# Patient Record
Sex: FEMALE | Race: WHITE | NOT HISPANIC OR LATINO | Employment: FULL TIME | URBAN - METROPOLITAN AREA
[De-identification: names, ages, dates, MRNs, and addresses within clinical notes are randomized per-mention and may not be internally consistent; named-entity substitution may affect disease eponyms.]

---

## 2017-01-22 ENCOUNTER — OFFICE VISIT (OUTPATIENT)
Dept: URGENT CARE | Facility: URGENT CARE | Age: 57
End: 2017-01-22
Payer: COMMERCIAL

## 2017-01-22 VITALS
OXYGEN SATURATION: 96 % | HEART RATE: 94 BPM | TEMPERATURE: 97.6 F | SYSTOLIC BLOOD PRESSURE: 106 MMHG | DIASTOLIC BLOOD PRESSURE: 74 MMHG | WEIGHT: 172.38 LBS | BODY MASS INDEX: 25.83 KG/M2

## 2017-01-22 DIAGNOSIS — J45.901 ASTHMA EXACERBATION: Primary | ICD-10-CM

## 2017-01-22 DIAGNOSIS — R09.89 CHEST CONGESTION: ICD-10-CM

## 2017-01-22 DIAGNOSIS — J20.9 ACUTE BRONCHITIS WITH SYMPTOMS > 10 DAYS: ICD-10-CM

## 2017-01-22 PROCEDURE — 99214 OFFICE O/P EST MOD 30 MIN: CPT | Mod: 25 | Performed by: PHYSICIAN ASSISTANT

## 2017-01-22 PROCEDURE — 94640 AIRWAY INHALATION TREATMENT: CPT | Performed by: PHYSICIAN ASSISTANT

## 2017-01-22 RX ORDER — ALBUTEROL SULFATE 0.83 MG/ML
1 SOLUTION RESPIRATORY (INHALATION) ONCE
Qty: 3 ML | Refills: 0
Start: 2017-01-22 | End: 2019-10-02

## 2017-01-22 RX ORDER — PREDNISONE 20 MG/1
TABLET ORAL
Qty: 14 TABLET | Refills: 0 | Status: SHIPPED | OUTPATIENT
Start: 2017-01-22 | End: 2017-01-26 | Stop reason: ALTCHOICE

## 2017-01-22 RX ORDER — ALBUTEROL SULFATE 0.83 MG/ML
1 SOLUTION RESPIRATORY (INHALATION) EVERY 4 HOURS PRN
Qty: 1 BOX | Refills: 0 | Status: SHIPPED | OUTPATIENT
Start: 2017-01-22 | End: 2021-10-15

## 2017-01-22 RX ORDER — ALBUTEROL SULFATE 90 UG/1
2 AEROSOL, METERED RESPIRATORY (INHALATION) EVERY 6 HOURS
Qty: 1 INHALER | Refills: 0 | Status: SHIPPED | OUTPATIENT
Start: 2017-01-22 | End: 2019-12-27

## 2017-01-22 RX ORDER — ALBUTEROL SULFATE 90 UG/1
2 AEROSOL, METERED RESPIRATORY (INHALATION) EVERY 6 HOURS
Qty: 1 INHALER | Refills: 0 | Status: CANCELLED | OUTPATIENT
Start: 2017-01-22

## 2017-01-22 RX ORDER — AZITHROMYCIN 250 MG/1
TABLET, FILM COATED ORAL
Qty: 6 TABLET | Refills: 0 | Status: SHIPPED | OUTPATIENT
Start: 2017-01-22 | End: 2017-01-26 | Stop reason: ALTCHOICE

## 2017-01-22 NOTE — NURSING NOTE
"Chief Complaint   Patient presents with     Cough     cough, sob, chest pain when coughing, running stuffy nose and fatigue for three days.      Initial /74 mmHg  Pulse 94  Temp(Src) 97.6  F (36.4  C) (Oral)  Wt 172 lb 6 oz (78.189 kg)  SpO2 96% Estimated body mass index is 25.83 kg/(m^2) as calculated from the following:    Height as of 6/17/16: 5' 8.5\" (1.74 m).    Weight as of this encounter: 172 lb 6 oz (78.189 kg)..  bp completed using cuff size regular  MAYELIN Arzate MA    "

## 2017-01-22 NOTE — PROGRESS NOTES
SUBJECTIVE:   Aylin Harding is a 56 year old female presenting with a chief complaint of coughing, chest congestion, wheezing.  Onset of symptoms was 3 day(s) ago.  Course of illness is worsening.    Severity moderate  Current and Associated symptoms: chest congestion, wheezing   Treatment measures tried include albuterol.  Predisposing factors include recent illness, hx of asthma and pneumonia.    Past Medical History   Diagnosis Date     Pneumonia, organism unspecified      Bronchiectasis 9/5/08     Uncomplicated asthma      Hormone replacement therapy 2011     Osteoporosis 2001     followed by PCP (Dr. Springer)        Allergies   Allergen Reactions     Mold          Social History   Substance Use Topics     Smoking status: Never Smoker      Smokeless tobacco: Never Used     Alcohol Use: 0.0 oz/week     0 Standard drinks or equivalent per week      Comment: rare socially       ROS:  CONSTITUTIONAL:NEGATIVE for fever, chills, change in weight  INTEGUMENTARY/SKIN: NEGATIVE for worrisome rashes, moles or lesions  ENT/MOUTH: POSITIVE for nasal congestion  RESP:POSITIVE for cough-productive, Hx asthma and wheezing  CV: NEGATIVE for chest pain, palpitations or peripheral edema  GI: NEGATIVE for nausea, abdominal pain, heartburn, or change in bowel habits  MUSCULOSKELETAL: NEGATIVE for significant arthralgias or myalgia  NEURO: NEGATIVE for weakness, dizziness or paresthesias    OBJECTIVE  :/74 mmHg  Pulse 94  Temp(Src) 97.6  F (36.4  C) (Oral)  Wt 172 lb 6 oz (78.189 kg)  SpO2 96%  GENERAL APPEARANCE: healthy, alert and no distress  HENT: ear canals and TM's normal.  Nose and mouth without ulcers, erythema or lesions  NECK: supple, nontender, no lymphadenopathy  RESP: expiratory wheezes moderate and diffuse  CV: regular rates and rhythm, normal S1 S2, no murmur noted  ABDOMEN:  soft, nontender, no HSM or masses and bowel sounds normal  NEURO: Normal strength and tone, sensory exam grossly normal,  normal  speech and mentation  SKIN: no suspicious lesions or rashes    Albuterol and atrovent neb treatments    ASSESSMENT/PLAN;      ICD-10-CM    1. Asthma exacerbation J45.901 ipratropium (ATROVENT) 0.02 % neb solution     albuterol (PROVENTIL HFA) 108 (90 BASE) MCG/ACT Inhaler     albuterol (2.5 MG/3ML) 0.083% neb solution   2. Acute bronchitis with symptoms > 10 days J20.9 INHALATION/NEBULIZER TREATMENT, INITIAL     albuterol (2.5 MG/3ML) 0.083% neb solution     predniSONE (DELTASONE) 20 MG tablet     azithromycin (ZITHROMAX) 250 MG tablet     albuterol (2.5 MG/3ML) 0.083% neb solution   3. Chest congestion R09.89        Continue using nebs at home  Follow up with PCP as needed  Go to the ED if symptoms worsen

## 2017-04-10 ENCOUNTER — MYC MEDICAL ADVICE (OUTPATIENT)
Dept: OTHER | Facility: CLINIC | Age: 57
End: 2017-04-10

## 2017-04-10 DIAGNOSIS — J47.9 BRONCHIECTASIS WITHOUT COMPLICATION (H): ICD-10-CM

## 2017-04-11 RX ORDER — LEVOFLOXACIN 500 MG/1
500 TABLET, FILM COATED ORAL DAILY
Qty: 14 TABLET | Refills: 0 | Status: SHIPPED | OUTPATIENT
Start: 2017-04-11 | End: 2017-06-20

## 2017-04-11 RX ORDER — PREDNISONE 10 MG/1
TABLET ORAL
Qty: 30 TABLET | Refills: 0 | Status: SHIPPED | OUTPATIENT
Start: 2017-04-11 | End: 2017-04-18

## 2017-04-18 ENCOUNTER — HOSPITAL ENCOUNTER (OUTPATIENT)
Dept: RESPIRATORY THERAPY | Facility: CLINIC | Age: 57
End: 2017-04-18
Attending: PHYSICIAN ASSISTANT
Payer: COMMERCIAL

## 2017-04-18 ENCOUNTER — HOSPITAL ENCOUNTER (OUTPATIENT)
Dept: LAB | Facility: CLINIC | Age: 57
End: 2017-04-18
Attending: PHYSICIAN ASSISTANT
Payer: COMMERCIAL

## 2017-04-18 ENCOUNTER — HOSPITAL ENCOUNTER (OUTPATIENT)
Dept: CT IMAGING | Facility: CLINIC | Age: 57
Discharge: HOME | End: 2017-04-18
Attending: PHYSICIAN ASSISTANT | Admitting: PHYSICIAN ASSISTANT
Payer: COMMERCIAL

## 2017-04-18 ENCOUNTER — OFFICE VISIT (OUTPATIENT)
Dept: OTHER | Facility: CLINIC | Age: 57
End: 2017-04-18
Attending: PHYSICIAN ASSISTANT
Payer: COMMERCIAL

## 2017-04-18 VITALS
HEIGHT: 69 IN | OXYGEN SATURATION: 99 % | TEMPERATURE: 97.7 F | BODY MASS INDEX: 25.48 KG/M2 | DIASTOLIC BLOOD PRESSURE: 71 MMHG | HEART RATE: 65 BPM | SYSTOLIC BLOOD PRESSURE: 104 MMHG | WEIGHT: 172 LBS

## 2017-04-18 DIAGNOSIS — J47.1 BRONCHIECTASIS WITH ACUTE EXACERBATION (H): ICD-10-CM

## 2017-04-18 DIAGNOSIS — Z00.00 ENCOUNTER FOR ROUTINE ADULT HEALTH EXAMINATION WITHOUT ABNORMAL FINDINGS: ICD-10-CM

## 2017-04-18 DIAGNOSIS — J47.1 BRONCHIECTASIS WITH ACUTE EXACERBATION (H): Primary | ICD-10-CM

## 2017-04-18 DIAGNOSIS — J47.9 BRONCHIECTASIS WITHOUT COMPLICATION (H): ICD-10-CM

## 2017-04-18 LAB
ALBUMIN SERPL-MCNC: 4.2 G/DL (ref 3.4–5)
ALP SERPL-CCNC: 82 U/L (ref 40–150)
ALT SERPL W P-5'-P-CCNC: 28 U/L (ref 0–50)
ANION GAP SERPL CALCULATED.3IONS-SCNC: 9 MMOL/L (ref 3–14)
AST SERPL W P-5'-P-CCNC: 13 U/L (ref 0–45)
BASOPHILS # BLD AUTO: 0 10E9/L (ref 0–0.2)
BASOPHILS NFR BLD AUTO: 0.2 %
BILIRUB SERPL-MCNC: 1.4 MG/DL (ref 0.2–1.3)
BUN SERPL-MCNC: 19 MG/DL (ref 7–30)
CALCIUM SERPL-MCNC: 9 MG/DL (ref 8.5–10.1)
CHLORIDE SERPL-SCNC: 105 MMOL/L (ref 94–109)
CHOLEST SERPL-MCNC: 198 MG/DL
CO2 SERPL-SCNC: 26 MMOL/L (ref 20–32)
CREAT SERPL-MCNC: 1.07 MG/DL (ref 0.52–1.04)
DIFFERENTIAL METHOD BLD: ABNORMAL
EOSINOPHIL # BLD AUTO: 0.2 10E9/L (ref 0–0.7)
EOSINOPHIL NFR BLD AUTO: 1.7 %
ERYTHROCYTE [DISTWIDTH] IN BLOOD BY AUTOMATED COUNT: 13.2 % (ref 10–15)
GFR SERPL CREATININE-BSD FRML MDRD: 53 ML/MIN/1.7M2
GLUCOSE SERPL-MCNC: 91 MG/DL (ref 70–99)
HBA1C MFR BLD: 5.4 % (ref 4.3–6)
HCT VFR BLD AUTO: 45.4 % (ref 35–47)
HDLC SERPL-MCNC: 103 MG/DL
HGB BLD-MCNC: 15.7 G/DL (ref 11.7–15.7)
IMM GRANULOCYTES # BLD: 0.1 10E9/L (ref 0–0.4)
IMM GRANULOCYTES NFR BLD: 0.6 %
LDLC SERPL CALC-MCNC: 69 MG/DL
LYMPHOCYTES # BLD AUTO: 4.9 10E9/L (ref 0.8–5.3)
LYMPHOCYTES NFR BLD AUTO: 42.2 %
MCH RBC QN AUTO: 29.8 PG (ref 26.5–33)
MCHC RBC AUTO-ENTMCNC: 34.6 G/DL (ref 31.5–36.5)
MCV RBC AUTO: 86 FL (ref 78–100)
MONOCYTES # BLD AUTO: 0.8 10E9/L (ref 0–1.3)
MONOCYTES NFR BLD AUTO: 6.8 %
NEUTROPHILS # BLD AUTO: 5.7 10E9/L (ref 1.6–8.3)
NEUTROPHILS NFR BLD AUTO: 48.5 %
NONHDLC SERPL-MCNC: 95 MG/DL
NRBC # BLD AUTO: 0 10*3/UL
NRBC BLD AUTO-RTO: 0 /100
PLATELET # BLD AUTO: 235 10E9/L (ref 150–450)
POTASSIUM SERPL-SCNC: 3.3 MMOL/L (ref 3.4–5.3)
PROT SERPL-MCNC: 7.6 G/DL (ref 6.8–8.8)
RBC # BLD AUTO: 5.26 10E12/L (ref 3.8–5.2)
SODIUM SERPL-SCNC: 140 MMOL/L (ref 133–144)
T4 FREE SERPL-MCNC: 1.09 NG/DL (ref 0.76–1.46)
TRIGL SERPL-MCNC: 132 MG/DL
TSH SERPL DL<=0.05 MIU/L-ACNC: 5.78 MU/L (ref 0.4–4)
WBC # BLD AUTO: 11.7 10E9/L (ref 4–11)

## 2017-04-18 PROCEDURE — 84443 ASSAY THYROID STIM HORMONE: CPT | Performed by: PHYSICIAN ASSISTANT

## 2017-04-18 PROCEDURE — 84439 ASSAY OF FREE THYROXINE: CPT | Performed by: PHYSICIAN ASSISTANT

## 2017-04-18 PROCEDURE — 25000125 ZZHC RX 250: Performed by: PHYSICIAN ASSISTANT

## 2017-04-18 PROCEDURE — 85025 COMPLETE CBC W/AUTO DIFF WBC: CPT | Performed by: PHYSICIAN ASSISTANT

## 2017-04-18 PROCEDURE — 94060 EVALUATION OF WHEEZING: CPT

## 2017-04-18 PROCEDURE — 94729 DIFFUSING CAPACITY: CPT

## 2017-04-18 PROCEDURE — 85018 HEMOGLOBIN: CPT | Performed by: PHYSICIAN ASSISTANT

## 2017-04-18 PROCEDURE — 94726 PLETHYSMOGRAPHY LUNG VOLUMES: CPT

## 2017-04-18 PROCEDURE — 99213 OFFICE O/P EST LOW 20 MIN: CPT | Mod: ZP | Performed by: PHYSICIAN ASSISTANT

## 2017-04-18 PROCEDURE — 99211 OFF/OP EST MAY X REQ PHY/QHP: CPT

## 2017-04-18 PROCEDURE — 80053 COMPREHEN METABOLIC PANEL: CPT | Performed by: PHYSICIAN ASSISTANT

## 2017-04-18 PROCEDURE — 36415 COLL VENOUS BLD VENIPUNCTURE: CPT | Performed by: PHYSICIAN ASSISTANT

## 2017-04-18 PROCEDURE — 80061 LIPID PANEL: CPT | Performed by: PHYSICIAN ASSISTANT

## 2017-04-18 PROCEDURE — 83036 HEMOGLOBIN GLYCOSYLATED A1C: CPT | Performed by: PHYSICIAN ASSISTANT

## 2017-04-18 PROCEDURE — 71260 CT THORAX DX C+: CPT

## 2017-04-18 PROCEDURE — 25500064 ZZH RX 255 OP 636: Performed by: PHYSICIAN ASSISTANT

## 2017-04-18 RX ORDER — PREDNISONE 10 MG/1
TABLET ORAL
Qty: 105 TABLET | Refills: 0 | Status: SHIPPED | OUTPATIENT
Start: 2017-04-18 | End: 2017-06-20

## 2017-04-18 RX ORDER — IOPAMIDOL 755 MG/ML
75 INJECTION, SOLUTION INTRAVASCULAR ONCE
Status: COMPLETED | OUTPATIENT
Start: 2017-04-18 | End: 2017-04-18

## 2017-04-18 RX ADMIN — IOPAMIDOL 75 ML: 755 INJECTION, SOLUTION INTRAVENOUS at 13:17

## 2017-04-18 RX ADMIN — SODIUM CHLORIDE 70 ML: 9 INJECTION, SOLUTION INTRAVENOUS at 13:18

## 2017-04-18 NOTE — NURSING NOTE
"Chief Complaint   Patient presents with     RECHECK     cough, malaise after completing medication       Initial /71 (BP Location: Right arm, Patient Position: Chair, Cuff Size: Adult Regular)  Pulse 65  Temp 97.7  F (36.5  C)  Ht 5' 9\" (1.753 m)  Wt 172 lb (78 kg)  SpO2 99%  BMI 25.4 kg/m2 Estimated body mass index is 25.4 kg/(m^2) as calculated from the following:    Height as of this encounter: 5' 9\" (1.753 m).    Weight as of this encounter: 172 lb (78 kg).  Medication Reconciliation: complete    Face to face time: 5 minutes    Nay Sabillon CMA    "

## 2017-04-18 NOTE — PROGRESS NOTES
[facility]  VASCULAR MEDICINE     FOLLOW-UP VISIT      PRIMARY HEALTH CARE PROVIDER:  Erick Springer    REASON FOR VISIT:  Worsening cough, wheezing, SOB with history of bad mold allergies and bronchiectasis      HPI: Aylin Harding is a 56 year old female with a history of severe mold allergy as well as bronchiectasis. She has done fairly well up until a little over one week ago when she developed a cough. She has been wheezing and feeling more short of breath with activity. She called in last week and began therapy with Levaquin and a Prednisone taper. However, she is about half way through her treatment and she is beginning to feel like she did prior to starting antibiotics and steroids. She has been utilizing her albuterol inhaler and nebulizer. She stopped taking her Advair and also has been bad on remembering to do Spiriva and has not done that for some time.     PAST MEDICAL HISTORY  Past Medical History:   Diagnosis Date     Bronchiectasis 9/5/08     Hormone replacement therapy 2011     Osteoporosis 2001    followed by PCP (Dr. Springer)     Pneumonia, organism unspecified      Uncomplicated asthma        CURRENT MEDICATIONS    Current Outpatient Prescriptions on File Prior to Visit:  levofloxacin (LEVAQUIN) 500 MG tablet Take 1 tablet (500 mg) by mouth daily   albuterol (PROVENTIL HFA) 108 (90 BASE) MCG/ACT Inhaler Inhale 2 puffs into the lungs every 6 hours   albuterol (2.5 MG/3ML) 0.083% neb solution Take 1 vial (2.5 mg) by nebulization every 4 hours as needed for shortness of breath / dyspnea or wheezing   tiotropium (SPIRIVA HANDIHALER) 18 MCG inhalation capsule Inhale 1 capsule (18 mcg) into the lungs daily Inhale contents of one capsule daily   cetirizine (ZYRTEC) 10 MG tablet Take 1 tablet (10 mg) by mouth every evening   ipratropium (ATROVENT) 0.02 % neb solution Take 2.5 mLs (0.5 mg) by nebulization once for 1 dose   cyclobenzaprine (FLEXERIL) 10 MG  tablet Take 1 tablet (10 mg) by mouth 3 times daily as needed for muscle spasms (Patient not taking: Reported on 4/18/2017)   progesterone (PROMETRIUM) 100 MG capsule TAKE ONE CAPSULE BY MOUTH EVERY DAY (Patient not taking: Reported on 4/18/2017)   estradiol (ESTRACE) 1 MG tablet Take 1mg By mouth daily. (Patient not taking: Reported on 4/18/2017)   predniSONE (DELTASONE) 20 MG tablet Take 3 tabs (60 mg) orally daily for 5 days, 2 tabs (40 mg) orally daily for 5 days, 1 tab (20 mg) orally daily for 5 days, then 1/2 tab (10 mg) orally for 5 days (Patient not taking: Reported on 4/18/2017)   fluticasone-salmeterol (ADVAIR) 250-50 MCG/DOSE diskus inhaler Inhale 1 puff into the lungs 2 times daily (Patient not taking: Reported on 4/18/2017)   ipratropium - albuterol 0.5 mg/2.5 mg/3 mL (DUONEB) 0.5-2.5 (3) MG/3ML nebulization Take 1 vial (3 mLs) by nebulization every 6 hours as needed for shortness of breath / dyspnea or wheezing (Patient not taking: Reported on 4/18/2017)     No current facility-administered medications on file prior to visit.     PAST SURGICAL HISTORY:  Past Surgical History:   Procedure Laterality Date     C ORAL SURGERY PROCEDURE       CARPAL TUNNEL RELEASE RT/LT  2009       ALLERGIES     Allergies   Allergen Reactions     Mold        FAMILY HISTORY  Family History   Problem Relation Age of Onset     Arthritis Mother      b:1937     Arthritis Father      b:1934     Family History Negative Sister      2 sisters b:1958,1962     Family History Negative Brother      2 brothers b:1960,1963       SOCIAL HISTORY  Social History     Social History     Marital status:      Spouse name: kerri     Number of children: 2     Years of education: 14     Occupational History     manager [employer]     Social History Main Topics     Smoking status: Never Smoker     Smokeless tobacco: Never Used     Alcohol use 0.0 oz/week     0 Standard drinks or equivalent per week      Comment: rare socially      "Drug use: No     Sexual activity: Yes     Partners: Male     Birth control/ protection: Post-menopausal     Other Topics Concern     Not on file     Social History Narrative       ROS:   General: No change in weight, sleep or appetite.  Normal energy.  No fever or chills  Eyes: Negative for vision changes or eye problems  ENT: No problems with ears, nose or throat.  No difficulty swallowing.  Resp: Productive cough with wheezing, rhonchi, and additional effort breathing.  Some shortness of breath when walking/doing stairs.   CV: No chest pains or palpitations  GI: No nausea, vomiting,  heartburn, abdominal pain, diarrhea, constipation or change in bowel habits  : No urinary frequency or dysuria, bladder or kidney problems  Musculoskeletal: No significant muscle or joint pains  Neurologic: No headaches, numbness, tingling, weakness, problems with balance or coordination  Psychiatric: No problems with anxiety, depression or mental health  Heme/immune/allergy: No history of bleeding or clotting problems or anemia.  + allergy to molds right now.   Endocrine: No history of thyroid disease, diabetes or other endocrine disorders  Skin: No rashes,worrisome lesions or skin problems      EXAM:  /71 (BP Location: Right arm, Patient Position: Chair, Cuff Size: Adult Regular)  Pulse 65  Temp 97.7  F (36.5  C)  Ht 1.753 m (5' 9\")  Wt 78 kg (172 lb)  SpO2 99%  BMI 25.4 kg/m2  In general, the patient is a pleasant female in no apparent distress.    HEENT: NC/AT.  PERRLA.  EOMI.  Sclerae white, not injected.  Nares clear.  Pharynx without erythema or exudate.  Dentition intact.    Neck: No adenopathy.    Heart: RRR. Normal S1, S2 splits physiologically. No murmur, rub, click, or gallop.   Lungs: Wheezing and rhonchi noted. Exuding additional effort to breathe but still looks comfortable and able to talk without difficulty.   Abdomen: Soft, nontender, nondistended.   Extremities: No clubbing, cyanosis, or " edema.      Labs:  LIPID RESULTS:  Lab Results   Component Value Date    CHOL 198 04/18/2017     04/18/2017    LDL 69 04/18/2017    TRIG 132 04/18/2017    CHOLHDLRATIO 2.7 06/11/2015       LIVER ENZYME RESULTS:  Lab Results   Component Value Date    AST 13 04/18/2017    ALT 28 04/18/2017       CBC RESULTS:  Lab Results   Component Value Date    WBC 11.7 (H) 04/18/2017    RBC 5.26 (H) 04/18/2017    HGB 15.7 04/18/2017    HCT 45.4 04/18/2017    MCV 86 04/18/2017    MCH 29.8 04/18/2017    MCHC 34.6 04/18/2017    RDW 13.2 04/18/2017     04/18/2017       BMP RESULTS:  Lab Results   Component Value Date     04/18/2017    POTASSIUM 3.3 (L) 04/18/2017    CHLORIDE 105 04/18/2017    CO2 26 04/18/2017    ANIONGAP 9 04/18/2017    GLC 91 04/18/2017    BUN 19 04/18/2017    CR 1.07 (H) 04/18/2017    GFRESTIMATED 53 (L) 04/18/2017    GFRESTBLACK 64 04/18/2017    BJORN 9.0 04/18/2017        A1C RESULTS:  Lab Results   Component Value Date    A1C 5.4 04/18/2017       THYROID RESULTS:  Lab Results   Component Value Date    TSH 5.78 (H) 04/18/2017         Procedures:   PFTs completed today. Results reviewed with Dr. Springer. Nothing alarming.     CT chest today: No significant change in areas of bronchiectasis, small pulmonary nodules. There is a new focal ground glass density in right lower lobe, possible atelactasis.       Assessment and Plan:     (J47.1) Bronchiectasis with acute exacerbation     Comment: Oxygen sats 99% on room air. Can see it is requiring increased effort to breathe. Wheezing and rhonchi present bilaterally. PFTs and CT with no acute abnormality. WBC elevated from steroids. Not improving on current dose of steroids and Levaquin.     Plan: Will increase steroid dose and make taper a bit longer in duration. She should continue and finish her Levaquin as planned. She is to follow up with Dr. Springer as scheduled on 5/5/17. She understands that she is to call us sooner should her symptoms worsen. She was  also advised to drink plenty of water after her CT with contrast today to help flush out her kidneys given her change in renal function. She was called with all results today.         Patient and plan discussed with Dr. Springer.         Katelynn Alaniz PA-C

## 2017-04-18 NOTE — MR AVS SNAPSHOT
After Visit Summary   4/18/2017    Aylin Harding    MRN: [id number]           Patient Information     Date Of Birth          1960        Visit Information        Provider Department      4/18/2017 10:15 AM Katelynn Alaniz PA-C [facility]      Today's Diagnoses     Bronchiectasis with acute exacerbation (H)    -  1    Bronchiectasis without complication (H)        Encounter for routine adult health examination without abnormal findings           Follow-ups after your visit        Your next 10 appointments already scheduled     Apr 18, 2017 11:15 AM CDT   Pulmonary Function with PFT LAB IN [facility] ([facility])    [address]  [facility] [address] [phone]           No Inhalers for 6 hours prior to test No Smoking 2 hours prior to test            May 05, 2017 11:30 AM CDT   Return Visit with Erick Springer MD   [facility] [place] [facility])    [address]  [facility] [address] [phone]              Future tests that were ordered for you today     Open Future Orders        Priority Expected Expires Ordered    CBC with platelets and differential Routine 4/18/2017 5/18/2017 4/18/2017    **Comprehensive metabolic panel FUTURE anytime Routine 4/18/2017 5/18/2017 4/18/2017    TSH Routine 4/18/2017 5/18/2017 4/18/2017    T4, free Routine 4/18/2017 5/18/2017 4/18/2017    **A1C FUTURE anytime Routine 4/18/2017 5/18/2017 4/18/2017    **Lipid panel reflex to direct LDL FUTURE anytime Routine 4/18/2017 5/18/2017 4/18/2017    CT Chest w Contrast Routine 4/18/2017 4/18/2018 4/18/2017    General PFT Lab (Please always keep checked) Routine  4/18/2018 4/18/2017    Pulmonary Function Test Routine  4/18/2018 4/18/2017            Who to contact     If you have questions or  "need follow up information about today's clinic visit or your schedule please contact [facility] directly at [phone].  Normal or non-critical lab and imaging results will be communicated to you by MyChart, letter or phone within 4 business days after the clinic has received the results. If you do not hear from us within 7 days, please contact the clinic through [employer] or phone. If you have a critical or abnormal lab result, we will notify you by phone as soon as possible.  Submit refill requests through [employer] or call your pharmacy and they will forward the refill request to us. Please allow 3 business days for your refill to be completed.          Additional Information About Your Visit        [employer] Information     [employer] gives you secure access to your electronic health record. If you see a primary care provider, you can also send messages to your care team and make appointments. If you have questions, please call your primary care clinic.  If you do not have a primary care provider, please call [phone] and they will assist you.        Care EveryWhere ID     This is your Care EveryWhere ID. This could be used by other organizations to access your [facility] medical records  [id number]        Your Vitals Were     Pulse Temperature Height Pulse Oximetry BMI (Body Mass Index)       65 97.7  F (36.5  C) 1.753 m (5' 9\") 99% 25.4 kg/m2        Blood Pressure from Last 3 Encounters:   04/18/17 104/71   01/22/17 106/74   09/02/16 106/70    Weight from Last 3 Encounters:   04/18/17 78 kg (172 lb)   01/22/17 78.2 kg (172 lb 6 oz)   06/17/16 75.8 kg (167 lb)                 Today's Medication Changes          These changes are accurate as of: 4/18/17 11:03 AM.  If you have any questions, ask your nurse or doctor.               These medicines have changed or have updated prescriptions.        Dose/Directions    * predniSONE 20 MG tablet   Commonly known as:  DELTASONE   This may have " changed:  Another medication with the same name was changed. Make sure you understand how and when to take each.   Used for:  Acute bronchospasm, Acute bronchitis with coexisting condition requiring prophylactic treatment        Take 3 tabs (60 mg) orally daily for 5 days, 2 tabs (40 mg) orally daily for 5 days, 1 tab (20 mg) orally daily for 5 days, then 1/2 tab (10 mg) orally for 5 days   Quantity:  33 tablet   Refills:  0       * predniSONE 10 MG tablet   Commonly known as:  DELTASONE   This may have changed:  additional instructions   Used for:  Bronchiectasis without complication (H)   Changed by:  Katelynn Alaniz PA-C        6 tabs PO daily times 5 days then 5 tabs PO daily times 5 days then 4 tabs PO daily times 5 days then 3 tabs PO daily times 5 days then  2 tabs PO daily times 5 days then  1 tab PO daily times 5 days   Quantity:  105 tablet   Refills:  0       * Notice:  This list has 2 medication(s) that are the same as other medications prescribed for you. Read the directions carefully, and ask your doctor or other care provider to review them with you.         Where to get your medicines      Some of these will need a paper prescription and others can be bought over the counter.  Ask your nurse if you have questions.     Bring a paper prescription for each of these medications     predniSONE 10 MG tablet                Primary Care Provider Office Phone # Fax #    Erick Springer [phone]       [facility] [address]  [facility] [address]        Thank you!     Thank you for choosing [facility] [place]  for your care. Our goal is always to provide you with excellent care. Hearing back from our patients is one way we can continue to improve our services. Please take a few minutes to complete the written survey that you may receive in the mail after your visit with us. Thank you!             Your Updated Medication List - Protect others around  you: Learn how to safely use, store and throw away your medicines at www.disposemymeds.org.          This list is accurate as of: 4/18/17 11:03 AM.  Always use your most recent med list.                   Brand Name Dispense Instructions for use    * albuterol 108 (90 BASE) MCG/ACT Inhaler    PROVENTIL HFA    1 Inhaler    Inhale 2 puffs into the lungs every 6 hours       * albuterol (2.5 MG/3ML) 0.083% neb solution     1 Box    Take 1 vial (2.5 mg) by nebulization every 4 hours as needed for shortness of breath / dyspnea or wheezing       cetirizine 10 MG tablet    zyrTEC    30 tablet    Take 1 tablet (10 mg) by mouth every evening       cyclobenzaprine 10 MG tablet    FLEXERIL    15 tablet    Take 1 tablet (10 mg) by mouth 3 times daily as needed for muscle spasms       estradiol 1 MG tablet    ESTRACE    90 tablet    Take 1mg By mouth daily.       fluticasone-salmeterol 250-50 MCG/DOSE diskus inhaler    ADVAIR    1 Inhaler    Inhale 1 puff into the lungs 2 times daily       ipratropium - albuterol 0.5 mg/2.5 mg/3 mL 0.5-2.5 (3) MG/3ML neb solution    DUONEB    30 vial    Take 1 vial (3 mLs) by nebulization every 6 hours as needed for shortness of breath / dyspnea or wheezing       ipratropium 0.02 % neb solution    ATROVENT    2.5 mL    Take 2.5 mLs (0.5 mg) by nebulization once for 1 dose       levofloxacin 500 MG tablet    LEVAQUIN    14 tablet    Take 1 tablet (500 mg) by mouth daily       * predniSONE 20 MG tablet    DELTASONE    33 tablet    Take 3 tabs (60 mg) orally daily for 5 days, 2 tabs (40 mg) orally daily for 5 days, 1 tab (20 mg) orally daily for 5 days, then 1/2 tab (10 mg) orally for 5 days       * predniSONE 10 MG tablet    DELTASONE    105 tablet    6 tabs PO daily times 5 days then 5 tabs PO daily times 5 days then 4 tabs PO daily times 5 days then 3 tabs PO daily times 5 days then  2 tabs PO daily times 5 days then  1 tab PO daily times 5 days       progesterone 100 MG capsule    PROMETRIUM     30 capsule    TAKE ONE CAPSULE BY MOUTH EVERY DAY       tiotropium 18 MCG capsule    SPIRIVA HANDIHALER    90 capsule    Inhale 1 capsule (18 mcg) into the lungs daily Inhale contents of one capsule daily       * Notice:  This list has 4 medication(s) that are the same as other medications prescribed for you. Read the directions carefully, and ask your doctor or other care provider to review them with you.

## 2017-04-20 LAB
DLCOCOR-%PRED-PRE: 88 %
DLCOCOR-PRE: 21.29 ML/MIN/MMHG
DLCOUNC-%PRED-PRE: 94 %
DLCOUNC-PRE: 22.65 ML/MIN/MMHG
DLCOUNC-PRED: 23.92 ML/MIN/MMHG
ERV-%PRED-PRE: 48 %
ERV-PRE: 0.49 L
ERV-PRED: 1 L
EXPTIME-PRE: 7.03 SEC
FEF2575-%PRED-POST: 49 %
FEF2575-%PRED-PRE: 36 %
FEF2575-POST: 1.32 L/SEC
FEF2575-PRE: 0.97 L/SEC
FEF2575-PRED: 2.64 L/SEC
FEFMAX-%PRED-PRE: 80 %
FEFMAX-PRE: 5.84 L/SEC
FEFMAX-PRED: 7.21 L/SEC
FEV1-%PRED-PRE: 55 %
FEV1-PRE: 1.66 L
FEV1FEV6-PRE: 67 %
FEV1FEV6-PRED: 81 %
FEV1FVC-PRE: 67 %
FEV1FVC-PRED: 79 %
FEV1SVC-PRE: 62 %
FEV1SVC-PRED: 76 %
FIFMAX-PRE: 3.64 L/SEC
FRCPLETH-%PRED-PRE: 118 %
FRCPLETH-PRE: 3.54 L
FRCPLETH-PRED: 2.98 L
FVC-%PRED-PRE: 64 %
FVC-PRE: 2.49 L
FVC-PRED: 3.84 L
IC-%PRED-PRE: 73 %
IC-PRE: 2.18 L
IC-PRED: 2.95 L
RVPLETH-%PRED-PRE: 146 %
RVPLETH-PRE: 3.06 L
RVPLETH-PRED: 2.09 L
TLCPLETH-%PRED-PRE: 99 %
TLCPLETH-PRE: 5.72 L
TLCPLETH-PRED: 5.78 L
VA-%PRED-PRE: 80 %
VA-PRE: 4.76 L
VC-%PRED-PRE: 67 %
VC-PRE: 2.67 L
VC-PRED: 3.95 L

## 2017-06-20 ENCOUNTER — MYC MEDICAL ADVICE (OUTPATIENT)
Dept: OTHER | Facility: CLINIC | Age: 57
End: 2017-06-20

## 2017-06-20 ENCOUNTER — OFFICE VISIT (OUTPATIENT)
Dept: OTHER | Facility: CLINIC | Age: 57
End: 2017-06-20
Attending: PHYSICIAN ASSISTANT
Payer: COMMERCIAL

## 2017-06-20 VITALS
SYSTOLIC BLOOD PRESSURE: 103 MMHG | OXYGEN SATURATION: 97 % | WEIGHT: 179 LBS | DIASTOLIC BLOOD PRESSURE: 68 MMHG | HEART RATE: 88 BPM | BODY MASS INDEX: 26.43 KG/M2

## 2017-06-20 DIAGNOSIS — J47.9 BRONCHIECTASIS WITHOUT COMPLICATION (H): ICD-10-CM

## 2017-06-20 PROCEDURE — 99213 OFFICE O/P EST LOW 20 MIN: CPT | Mod: ZP | Performed by: PHYSICIAN ASSISTANT

## 2017-06-20 PROCEDURE — 99211 OFF/OP EST MAY X REQ PHY/QHP: CPT

## 2017-06-20 RX ORDER — PREDNISONE 10 MG/1
TABLET ORAL
Qty: 105 TABLET | Refills: 0 | Status: SHIPPED | OUTPATIENT
Start: 2017-06-20 | End: 2019-10-02

## 2017-06-20 RX ORDER — LEVOFLOXACIN 500 MG/1
500 TABLET, FILM COATED ORAL DAILY
Qty: 14 TABLET | Refills: 0 | Status: SHIPPED | OUTPATIENT
Start: 2017-06-20 | End: 2019-06-15

## 2017-06-20 NOTE — NURSING NOTE
"Chief Complaint   Patient presents with     RECHECK     cough 5-6 days        Initial /68 (BP Location: Right arm, Patient Position: Chair, Cuff Size: Adult Regular)  Pulse 88  Wt 179 lb (81.2 kg)  SpO2 97%  Breastfeeding? No  BMI 26.43 kg/m2 Estimated body mass index is 26.43 kg/(m^2) as calculated from the following:    Height as of 4/18/17: 5' 9\" (1.753 m).    Weight as of this encounter: 179 lb (81.2 kg).  Medication Reconciliation: complete     Face to face nursing time: 8 minutes    Niyah Do MA     "

## 2017-06-20 NOTE — PROGRESS NOTES
[facility] CENTER  VASCULAR MEDICINE     FOLLOW-UP VISIT      PRIMARY HEALTH CARE PROVIDER:  Erick Springer    REASON FOR VISIT:  Cough with exacerbation of bronchiectasis      HPI: Aylin Harding is a 56 year old female with a history of severe mold allergy as well as bronchiectasis. She was hospitalized 2 years ago around the same time with an acute exacerbation of bronchiectasis. She did well. However, this past year she has had mild exacerbations of her bronchiectasis in January and April requiring steroids and antibiotics. She did have a CT done in April, but no new significant changes in areas of bronchiectasis and small pulmonary nodules. Pulmonary function tests were also obtained,  Consistent with moderate airflow obstruction and suggestion of air trapping. She was doing fairly well up until this past week when she developed a cough. She has been wheezing and feeling more short of breath with activity. She has been utilizing her Spiriva and albuterol inhalers and nebulizer. Denies fever, chills, chest pain. No other cold symptoms.        PAST MEDICAL HISTORY  Past Medical History:   Diagnosis Date     Bronchiectasis 9/5/08     Hormone replacement therapy 2011     Osteoporosis 2001    followed by PCP (Dr. Springer)     Pneumonia, organism unspecified      Uncomplicated asthma        CURRENT MEDICATIONS    Current Outpatient Prescriptions on File Prior to Visit:  albuterol (PROVENTIL HFA) 108 (90 BASE) MCG/ACT Inhaler Inhale 2 puffs into the lungs every 6 hours   albuterol (2.5 MG/3ML) 0.083% neb solution Take 1 vial (2.5 mg) by nebulization every 4 hours as needed for shortness of breath / dyspnea or wheezing   tiotropium (SPIRIVA HANDIHALER) 18 MCG inhalation capsule Inhale 1 capsule (18 mcg) into the lungs daily Inhale contents of one capsule daily   cetirizine (ZYRTEC) 10 MG tablet Take 1 tablet (10 mg) by mouth every evening   fluticasone-salmeterol (ADVAIR) 250-50  MCG/DOSE diskus inhaler Inhale 1 puff into the lungs 2 times daily     No current facility-administered medications on file prior to visit.     PAST SURGICAL HISTORY:  Past Surgical History:   Procedure Laterality Date     C ORAL SURGERY PROCEDURE       CARPAL TUNNEL RELEASE RT/LT  2009       ALLERGIES     Allergies   Allergen Reactions     Mold        FAMILY HISTORY  Family History   Problem Relation Age of Onset     Arthritis Mother      b:1937     Arthritis Father      b:1934     Family History Negative Sister      2 sisters b:1958,1962     Family History Negative Brother      2 brothers b:1960,1963       SOCIAL HISTORY  Social History     Social History     Marital status:      Spouse name: kerri     Number of children: 2     Years of education: 14     Occupational History     manager [employer]     Social History Main Topics     Smoking status: Never Smoker     Smokeless tobacco: Never Used     Alcohol use 0.0 oz/week     0 Standard drinks or equivalent per week      Comment: rare socially     Drug use: No     Sexual activity: Yes     Partners: Male     Birth control/ protection: Post-menopausal     Other Topics Concern     Not on file     Social History Narrative       ROS:   General: No change in weight, sleep or appetite.  +fatigue.  No fever or chills  Eyes: Negative for vision changes or eye problems  ENT: No problems with ears, nose or throat.  No difficulty swallowing.  Resp: + cough with SOB on exertion, + wheezing  CV: No chest pains or palpitations  GI: No nausea, vomiting,  heartburn, abdominal pain, diarrhea, constipation or change in bowel habits  : No urinary frequency or dysuria, bladder or kidney problems  Musculoskeletal: No significant muscle or joint pains  Neurologic: No headaches, numbness, tingling, weakness, problems with balance or coordination  Psychiatric: No problems with anxiety, depression or mental health  Heme/immune/allergy: No history of bleeding or clotting  problems or anemia.  No allergies or immune system problems  Endocrine: No history of thyroid disease, diabetes or other endocrine disorders  Skin: No rashes,worrisome lesions or skin problems      EXAM:  /68 (BP Location: Right arm, Patient Position: Chair, Cuff Size: Adult Regular)  Pulse 88  Wt 179 lb (81.2 kg)  SpO2 97%  Breastfeeding? No  BMI 26.43 kg/m2  In general, the patient is a pleasant female in no apparent distress.    HEENT: NC/AT.  PERRLA.  EOMI.  Sclerae white, not injected.  Nares clear.  Pharynx without erythema or exudate.  Dentition intact.    Neck: No adenopathy.   Heart: RRR. Normal S1, S2 splits physiologically. No murmur, rub, click, or gallop.   Lungs: + ronchi, wheezing diffusely  Abdomen: Soft, nontender, nondistended.    Extremities: No clubbing, cyanosis, or edema. No wounds.     Labs:  LIPID RESULTS:  Lab Results   Component Value Date    CHOL 198 04/18/2017     04/18/2017    LDL 69 04/18/2017    TRIG 132 04/18/2017    CHOLHDLRATIO 2.7 06/11/2015       LIVER ENZYME RESULTS:  Lab Results   Component Value Date    AST 13 04/18/2017    ALT 28 04/18/2017       CBC RESULTS:  Lab Results   Component Value Date    WBC 11.7 (H) 04/18/2017    RBC 5.26 (H) 04/18/2017    HGB 15.7 04/18/2017    HCT 45.4 04/18/2017    MCV 86 04/18/2017    MCH 29.8 04/18/2017    MCHC 34.6 04/18/2017    RDW 13.2 04/18/2017     04/18/2017       BMP RESULTS:  Lab Results   Component Value Date     04/18/2017    POTASSIUM 3.3 (L) 04/18/2017    CHLORIDE 105 04/18/2017    CO2 26 04/18/2017    ANIONGAP 9 04/18/2017    GLC 91 04/18/2017    BUN 19 04/18/2017    CR 1.07 (H) 04/18/2017    GFRESTIMATED 53 (L) 04/18/2017    GFRESTBLACK 64 04/18/2017    BJORN 9.0 04/18/2017        A1C RESULTS:  Lab Results   Component Value Date    A1C 5.4 04/18/2017       THYROID RESULTS:  Lab Results   Component Value Date    TSH 5.78 (H) 04/18/2017         Assessment and Plan:     (J47.1) Bronchiectasis with acute  exacerbation      Comment: Oxygen sats 97% on room air. Can see it is requiring increased effort to breathe. Wheezing and rhonchi present bilaterally.      Plan:Prednisone taper and Levaquin. Given how these episodes are increasing in frequency, it was recommended that she follow-up with Pulmonary. She hasn't seen them in years. A referral was placed for Dr. Bone of [facility]. She is to follow up with Dr. Springer as scheduled in 8/2017. She understands that she is to call us sooner should her symptoms worsen.         Patient and plan discussed with Dr. Springer.     Katelynn Alaniz PA-C

## 2017-06-20 NOTE — MR AVS SNAPSHOT
After Visit Summary   6/20/2017    Aylin Harding    MRN: [id number]           Patient Information     Date Of Birth          1960        Visit Information        Provider Department      6/20/2017 1:15 PM Katelynn Alaniz PA-C [facility]      Today's Diagnoses     Bronchiectasis without complication (H)           Follow-ups after your visit        Additional Services     PULMONARY MEDICINE REFERRAL       Your provider has referred you to: N: [place] [facility] [phone]   [email or web address]    Dr. Annie Bone    Please be aware that coverage of these services is subject to the terms and limitations of your health insurance plan.  Call member services at your health plan with any benefit or coverage questions.      Please bring the following with you to your appointment:    (1) Any X-Rays, CTs or MRIs which have been performed.  Contact the facility where they were done to arrange for  prior to your scheduled appointment.    (2) List of current medications   (3) This referral request   (4) Any documents/labs given to you for this referral                  Your next 10 appointments already scheduled     Aug 09, 2017  1:00 PM CDT   Return Visit with Erick Springer MD   [facility] [facility]    [address]  [facility] [id number] [phone]              Who to contact     If you have questions or need follow up information about today's clinic visit or your schedule please contact [facility] directly at [phone].  Normal or non-critical lab and imaging results will be communicated to you by MyChart, letter or phone within 4 business days after the clinic has received the results. If you do not hear from us within 7 days, please contact the clinic through MyChart or phone. If  you have a critical or abnormal lab result, we will notify you by phone as soon as possible.  Submit refill requests through iNeed or call your pharmacy and they will forward the refill request to us. Please allow 3 business days for your refill to be completed.          Additional Information About Your Visit        Yieldexhart Information     iNeed gives you secure access to your electronic health record. If you see a primary care provider, you can also send messages to your care team and make appointments. If you have questions, please call your primary care clinic.  If you do not have a primary care provider, please call [phone] and they will assist you.        Care EveryWhere ID     This is your Care EveryWhere ID. This could be used by other organizations to access your [place] medical records  [id number]        Your Vitals Were     Pulse Pulse Oximetry Breastfeeding? BMI (Body Mass Index)          88 97% No 26.43 kg/m2         Blood Pressure from Last 3 Encounters:   06/20/17 103/68   04/18/17 104/71   01/22/17 106/74    Weight from Last 3 Encounters:   06/20/17 179 lb (81.2 kg)   04/18/17 172 lb (78 kg)   01/22/17 172 lb 6 oz (78.2 kg)              We Performed the Following     PULMONARY MEDICINE REFERRAL          Where to get your medicines      These medications were sent to [facility] [address] [address]     Phone:  [phone]     levofloxacin 500 MG tablet         Some of these will need a paper prescription and others can be bought over the counter.  Ask your nurse if you have questions.     Bring a paper prescription for each of these medications     predniSONE 10 MG tablet          Primary Care Provider Office Phone # Fax #    Erick Springer [phone]       [facility] [address]  [place] [address]        Thank you!     Thank you for choosing [facility]  CENTER  for your care. Our goal is always to provide you with excellent care. Hearing back from our patients is one way we can continue to improve our services. Please take a few minutes to complete the written survey that you may receive in the mail after your visit with us. Thank you!             Your Updated Medication List - Protect others around you: Learn how to safely use, store and throw away your medicines at www.disposemymeds.org.          This list is accurate as of: 6/20/17  1:34 PM.  Always use your most recent med list.                   Brand Name Dispense Instructions for use    * albuterol 108 (90 BASE) MCG/ACT Inhaler    PROVENTIL HFA    1 Inhaler    Inhale 2 puffs into the lungs every 6 hours       * albuterol (2.5 MG/3ML) 0.083% neb solution     1 Box    Take 1 vial (2.5 mg) by nebulization every 4 hours as needed for shortness of breath / dyspnea or wheezing       cetirizine 10 MG tablet    zyrTEC    30 tablet    Take 1 tablet (10 mg) by mouth every evening       fluticasone-salmeterol 250-50 MCG/DOSE diskus inhaler    ADVAIR    1 Inhaler    Inhale 1 puff into the lungs 2 times daily       levofloxacin 500 MG tablet    LEVAQUIN    14 tablet    Take 1 tablet (500 mg) by mouth daily       predniSONE 10 MG tablet    DELTASONE    105 tablet    6 tabs PO daily times 5 days then 5 tabs PO daily times 5 days then 4 tabs PO daily times 5 days then 3 tabs PO daily times 5 days then  2 tabs PO daily times 5 days then  1 tab PO daily times 5 days       tiotropium 18 MCG capsule    SPIRIVA HANDIHALER    90 capsule    Inhale 1 capsule (18 mcg) into the lungs daily Inhale contents of one capsule daily       * Notice:  This list has 2 medication(s) that are the same as other medications prescribed for you. Read the directions carefully, and ask your doctor or other care provider to review them with you.

## 2017-07-22 ENCOUNTER — HEALTH MAINTENANCE LETTER (OUTPATIENT)
Age: 57
End: 2017-07-22

## 2017-08-24 ENCOUNTER — TRANSFERRED RECORDS (OUTPATIENT)
Dept: HEALTH INFORMATION MANAGEMENT | Facility: CLINIC | Age: 57
End: 2017-08-24

## 2017-08-28 ENCOUNTER — TRANSFERRED RECORDS (OUTPATIENT)
Dept: HEALTH INFORMATION MANAGEMENT | Facility: CLINIC | Age: 57
End: 2017-08-28

## 2017-09-13 ENCOUNTER — OFFICE VISIT (OUTPATIENT)
Dept: URGENT CARE | Facility: URGENT CARE | Age: 57
End: 2017-09-13
Payer: COMMERCIAL

## 2017-09-13 VITALS
DIASTOLIC BLOOD PRESSURE: 62 MMHG | BODY MASS INDEX: 26.43 KG/M2 | WEIGHT: 179 LBS | SYSTOLIC BLOOD PRESSURE: 108 MMHG | TEMPERATURE: 97.8 F | HEART RATE: 94 BPM | OXYGEN SATURATION: 95 %

## 2017-09-13 DIAGNOSIS — J47.0 BRONCHIECTASIS WITH ACUTE LOWER RESPIRATORY INFECTION (H): Primary | ICD-10-CM

## 2017-09-13 PROCEDURE — 99213 OFFICE O/P EST LOW 20 MIN: CPT | Performed by: PHYSICIAN ASSISTANT

## 2017-09-13 RX ORDER — LEVOFLOXACIN 500 MG/1
500 TABLET, FILM COATED ORAL DAILY
Qty: 10 TABLET | Refills: 0 | Status: SHIPPED | OUTPATIENT
Start: 2017-09-13 | End: 2019-06-15

## 2017-09-13 RX ORDER — PREDNISONE 20 MG/1
TABLET ORAL
Refills: 0 | COMMUNITY
Start: 2017-01-22 | End: 2019-10-02

## 2017-09-13 RX ORDER — PREDNISONE 20 MG/1
TABLET ORAL
Qty: 18 TABLET | Refills: 0 | Status: SHIPPED | OUTPATIENT
Start: 2017-09-13 | End: 2019-10-02

## 2017-09-13 NOTE — MR AVS SNAPSHOT
After Visit Summary   9/13/2017    Aylin Harding    MRN: [id number]           Patient Information     Date Of Birth          1960        Visit Information        Provider Department      9/13/2017 4:15 PM Vivian Farris PA-C [facility]        Today's Diagnoses     Bronchiectasis with acute lower respiratory infection (H)    -  1      Care Instructions    (J47.0) Bronchiectasis with acute lower respiratory infection (H)  (primary encounter diagnosis)  Comment:   Plan: predniSONE (DELTASONE) 20 MG tablet,   Start with prednisone.  Await call from pulmonology, follow their plan if different from levaquin.         levofloxacin (LEVAQUIN) 500 MG tablet            Follow up with pulmonology            Follow-ups after your visit        Who to contact     If you have questions or need follow up information about today's clinic visit or your schedule please contact [facility] directly at [phone].  Normal or non-critical lab and imaging results will be communicated to you by PrePayhart, letter or phone within 4 business days after the clinic has received the results. If you do not hear from us within 7 days, please contact the clinic through Mango DSP or phone. If you have a critical or abnormal lab result, we will notify you by phone as soon as possible.  Submit refill requests through Mango DSP or call your pharmacy and they will forward the refill request to us. Please allow 3 business days for your refill to be completed.          Additional Information About Your Visit        PrePayhart Information     Mango DSP gives you secure access to your electronic health record. If you see a primary care provider, you can also send messages to your care team and make appointments. If you have questions, please call your primary care clinic.  If you do not have a primary care provider, please call [phone] and they will assist you.         Care EveryWhere ID     This is your Care EveryWhere ID. This could be used by other organizations to access your [place] medical records  [id number]        Your Vitals Were     Pulse Temperature Pulse Oximetry BMI (Body Mass Index)          94 97.8  F (36.6  C) (Oral) 95% 26.43 kg/m2         Blood Pressure from Last 3 Encounters:   09/13/17 108/62   06/20/17 103/68   04/18/17 104/71    Weight from Last 3 Encounters:   09/13/17 179 lb (81.2 kg)   06/20/17 179 lb (81.2 kg)   04/18/17 172 lb (78 kg)              Today, you had the following     No orders found for display         Today's Medication Changes          These changes are accurate as of: 9/13/17  4:53 PM.  If you have any questions, ask your nurse or doctor.               These medicines have changed or have updated prescriptions.        Dose/Directions    * levofloxacin 500 MG tablet   Commonly known as:  LEVAQUIN   This may have changed:  Another medication with the same name was added. Make sure you understand how and when to take each.   Used for:  Bronchiectasis without complication (H)   Changed by:  Katelynn Alaniz PA-C        Dose:  500 mg   Take 1 tablet (500 mg) by mouth daily   Quantity:  14 tablet   Refills:  0       * levofloxacin 500 MG tablet   Commonly known as:  LEVAQUIN   This may have changed:  You were already taking a medication with the same name, and this prescription was added. Make sure you understand how and when to take each.   Used for:  Bronchiectasis with acute lower respiratory infection (H)   Changed by:  Vivian Farris PA-C        Dose:  500 mg   Take 1 tablet (500 mg) by mouth daily   Quantity:  10 tablet   Refills:  0       * predniSONE 20 MG tablet   Commonly known as:  DELTASONE   This may have changed:  Another medication with the same name was added. Make sure you understand how and when to take each.   Changed by:  Vivian Farris PA-C        TK 1 T PO BID FOR 5 DAYS THEN 1 D FOR 3 DAYS  THEN 1/2 D FOR 2 DAYS   Refills:  0       * predniSONE 10 MG tablet   Commonly known as:  DELTASONE   This may have changed:  Another medication with the same name was added. Make sure you understand how and when to take each.   Used for:  Bronchiectasis without complication (H)   Changed by:  Katelynn Alaniz PA-C        6 tabs PO daily times 5 days then 5 tabs PO daily times 5 days then 4 tabs PO daily times 5 days then 3 tabs PO daily times 5 days then  2 tabs PO daily times 5 days then  1 tab PO daily times 5 days   Quantity:  105 tablet   Refills:  0       * predniSONE 20 MG tablet   Commonly known as:  DELTASONE   This may have changed:  You were already taking a medication with the same name, and this prescription was added. Make sure you understand how and when to take each.   Used for:  Bronchiectasis with acute lower respiratory infection (H)   Changed by:  Vivian Farris PA-C        3 po QD for 3 days, then 2 po QD for 3 days, then 1 po QD for 3 days   Quantity:  18 tablet   Refills:  0       * Notice:  This list has 5 medication(s) that are the same as other medications prescribed for you. Read the directions carefully, and ask your doctor or other care provider to review them with you.         Where to get your medicines      These medications were sent to [facility] [address] [address]     Phone:  [phone]     levofloxacin 500 MG tablet    predniSONE 20 MG tablet                Primary Care Provider Office Phone # Fax #    Erick Springer [phone]       [facility] [address]  [place] [address]        Equal Access to Services     VENKAT BAXTER : Hadii tha Velásquez, waaxda luqsatya, qaybta kaalferny calixto. So [facility] [phone].    ATENCIÓN: Si habla español, tiene a yap disposición servicios gratuitos de  asistencia lingüística. Bree al [phone].    We comply with applicable federal civil rights laws and [place] laws. We do not discriminate on the basis of race, color, national origin, age, disability sex, sexual orientation or gender identity.            Thank you!     Thank you for choosing [facility]  for your care. Our goal is always to provide you with excellent care. Hearing back from our patients is one way we can continue to improve our services. Please take a few minutes to complete the written survey that you may receive in the mail after your visit with us. Thank you!             Your Updated Medication List - Protect others around you: Learn how to safely use, store and throw away your medicines at www.disposemymeds.org.          This list is accurate as of: 9/13/17  4:53 PM.  Always use your most recent med list.                   Brand Name Dispense Instructions for use Diagnosis    * albuterol 108 (90 BASE) MCG/ACT Inhaler    PROVENTIL HFA    1 Inhaler    Inhale 2 puffs into the lungs every 6 hours    Asthma exacerbation       * albuterol (2.5 MG/3ML) 0.083% neb solution     1 Box    Take 1 vial (2.5 mg) by nebulization every 4 hours as needed for shortness of breath / dyspnea or wheezing    Acute bronchitis with symptoms > 10 days, Asthma exacerbation       cetirizine 10 MG tablet    zyrTEC    30 tablet    Take 1 tablet (10 mg) by mouth every evening    Environmental and seasonal allergies       fluticasone-salmeterol 250-50 MCG/DOSE diskus inhaler    ADVAIR    1 Inhaler    Inhale 1 puff into the lungs 2 times daily    Bronchiectasis (H), Acute bronchitis with coexisting condition requiring prophylactic treatment       INCRUSE ELLIPTA 62.5 MCG/INH oral inhaler   Generic drug:  umeclidinium           * levofloxacin 500 MG tablet    LEVAQUIN    14 tablet    Take 1 tablet (500 mg) by mouth daily    Bronchiectasis without complication (H)       * levofloxacin 500 MG  tablet    LEVAQUIN    10 tablet    Take 1 tablet (500 mg) by mouth daily    Bronchiectasis with acute lower respiratory infection (H)       * predniSONE 20 MG tablet    DELTASONE     TK 1 T PO BID FOR 5 DAYS THEN 1 D FOR 3 DAYS THEN 1/2 D FOR 2 DAYS        * predniSONE 10 MG tablet    DELTASONE    105 tablet    6 tabs PO daily times 5 days then 5 tabs PO daily times 5 days then 4 tabs PO daily times 5 days then 3 tabs PO daily times 5 days then  2 tabs PO daily times 5 days then  1 tab PO daily times 5 days    Bronchiectasis without complication (H)       * predniSONE 20 MG tablet    DELTASONE    18 tablet    3 po QD for 3 days, then 2 po QD for 3 days, then 1 po QD for 3 days    Bronchiectasis with acute lower respiratory infection (H)       tiotropium 18 MCG capsule    SPIRIVA HANDIHALER    90 capsule    Inhale 1 capsule (18 mcg) into the lungs daily Inhale contents of one capsule daily    Bronchiectasis with acute exacerbation (H)       * Notice:  This list has 7 medication(s) that are the same as other medications prescribed for you. Read the directions carefully, and ask your doctor or other care provider to review them with you.

## 2017-09-13 NOTE — PATIENT INSTRUCTIONS
(J47.0) Bronchiectasis with acute lower respiratory infection (H)  (primary encounter diagnosis)  Comment:   Plan: predniSONE (DELTASONE) 20 MG tablet,   Start with prednisone.  Await call from pulmonology, follow their plan if different from levaquin.         levofloxacin (LEVAQUIN) 500 MG tablet            Follow up with pulmonology

## 2017-09-13 NOTE — NURSING NOTE
"Chief Complaint   Patient presents with     Urgent Care     was placed on prednisone on Friday and has been experiencing SOB, Sx began Monday after a broncoscopy.       Initial /62 (BP Location: Left arm, Cuff Size: Adult Regular)  Pulse 94  Temp 97.8  F (36.6  C) (Oral)  Wt 179 lb (81.2 kg)  SpO2 95%  BMI 26.43 kg/m2 Estimated body mass index is 26.43 kg/(m^2) as calculated from the following:    Height as of 4/18/17: 5' 9\" (1.753 m).    Weight as of this encounter: 179 lb (81.2 kg).  Medication Reconciliation: complete   Teresa Coto MA      "

## 2017-09-13 NOTE — PROGRESS NOTES
SUBJECTIVE:   Aylin Harding is a 57 year old female presenting with a chief complaint of:  1) coughing occasionally productive, and wheezing since 9/8/17.  Was started on prednisone 40mg 9/8/17, will be down to 20mg tomorrow.   Had a bronchoscopy on 9/11/17.  Was seen by pulmonology about 2 weeks ago  Started a new steroid inhaler 2 weeks ago.   No fevers.      Onset of symptoms was as above.  Course of illness is worsening.    Severity moderate  Current and Associated symptoms: as above  Treatment measures tried include as above.  Predisposing factors include long history of bronchiectasis and exacerbations    SH: traveling to [place] this weekend.      Past Medical History:   Diagnosis Date     Bronchiectasis 9/5/08     Hormone replacement therapy 2011     Osteoporosis 2001    followed by PCP (Dr. Springer)     Pneumonia, organism      Uncomplicated asthma      Patient Active Problem List   Diagnosis     Bronchiectasis (H)     CARDIOVASCULAR SCREENING; LDL GOAL LESS THAN 160     Osteoporosis     Hormone replacement therapy     Uncomplicated asthma     Right-sided low back pain without sciatica     Social History   Substance Use Topics     Smoking status: Never Smoker     Smokeless tobacco: Never Used     Alcohol use 0.0 oz/week     0 Standard drinks or equivalent per week      Comment: rare socially       ROS:  CONSTITUTIONAL:NEGATIVE for fever, chills, change in weight  INTEGUMENTARY/SKIN: NEGATIVE for worrisome rashes, moles or lesions  ENT/MOUTH: as above  RESP:as above  CV: NEGATIVE for chest pain, palpitations or peripheral edema    OBJECTIVE  :/62 (BP Location: Left arm, Cuff Size: Adult Regular)  Pulse 94  Temp 97.8  F (36.6  C) (Oral)  Wt 179 lb (81.2 kg)  SpO2 95%  BMI 26.43 kg/m2  GENERAL APPEARANCE: healthy, alert and no distress  RESP: wheezing throughout  CV: regular rates and rhythm, normal S1 S2, no murmur noted  SKIN: no suspicious lesions or rashes    (J47.0) Bronchiectasis  with acute lower respiratory infection (H)  (primary encounter diagnosis)  Comment:   Plan: predniSONE (DELTASONE) 20 MG tablet,   Start with prednisone.  Await call from pulmonology, follow their plan if different from levaquin.         levofloxacin (LEVAQUIN) 500 MG tablet            Follow up with pulmonology    Patient expresses understanding and agreement with the assessment and plan as above.

## 2017-09-29 ENCOUNTER — HOSPITAL ENCOUNTER (OUTPATIENT)
Dept: CT IMAGING | Facility: CLINIC | Age: 57
Discharge: HOME | End: 2017-09-29
Attending: INTERNAL MEDICINE | Admitting: INTERNAL MEDICINE
Payer: COMMERCIAL

## 2017-09-29 DIAGNOSIS — R05.9 COUGH: ICD-10-CM

## 2017-09-29 PROCEDURE — 71250 CT THORAX DX C-: CPT

## 2017-10-24 ENCOUNTER — TRANSFERRED RECORDS (OUTPATIENT)
Dept: HEALTH INFORMATION MANAGEMENT | Facility: CLINIC | Age: 57
End: 2017-10-24

## 2017-12-18 ENCOUNTER — TRANSFERRED RECORDS (OUTPATIENT)
Dept: HEALTH INFORMATION MANAGEMENT | Facility: CLINIC | Age: 57
End: 2017-12-18

## 2017-12-21 ENCOUNTER — HOSPITAL ENCOUNTER (OUTPATIENT)
Dept: CT IMAGING | Facility: CLINIC | Age: 57
Discharge: HOME | End: 2017-12-21
Attending: INTERNAL MEDICINE | Admitting: INTERNAL MEDICINE
Payer: COMMERCIAL

## 2017-12-21 DIAGNOSIS — J45.909 ASTHMA: ICD-10-CM

## 2017-12-21 DIAGNOSIS — J98.4 OTHER DISORDERS OF LUNG (CODE): ICD-10-CM

## 2017-12-21 DIAGNOSIS — J47.9 BRONCHIECTASIS WITHOUT COMPLICATION (H): ICD-10-CM

## 2017-12-21 DIAGNOSIS — B44.9 ASPERGILLOSIS (H): ICD-10-CM

## 2017-12-21 PROCEDURE — 71260 CT THORAX DX C+: CPT

## 2017-12-21 PROCEDURE — 25000128 H RX IP 250 OP 636: Performed by: INTERNAL MEDICINE

## 2017-12-21 PROCEDURE — 25000125 ZZHC RX 250: Performed by: INTERNAL MEDICINE

## 2017-12-21 RX ORDER — IOPAMIDOL 755 MG/ML
75 INJECTION, SOLUTION INTRAVASCULAR ONCE
Status: COMPLETED | OUTPATIENT
Start: 2017-12-21 | End: 2017-12-21

## 2017-12-21 RX ADMIN — IOPAMIDOL 75 ML: 755 INJECTION, SOLUTION INTRAVENOUS at 14:26

## 2017-12-21 RX ADMIN — SODIUM CHLORIDE 70 ML: 9 INJECTION, SOLUTION INTRAVENOUS at 14:27

## 2018-06-15 ENCOUNTER — ALLIED HEALTH/NURSE VISIT (OUTPATIENT)
Dept: NURSING | Facility: CLINIC | Age: 58
End: 2018-06-15
Payer: COMMERCIAL

## 2018-06-15 DIAGNOSIS — Z23 NEED FOR VACCINATION: Primary | ICD-10-CM

## 2018-06-15 PROCEDURE — 90471 IMMUNIZATION ADMIN: CPT

## 2018-06-15 PROCEDURE — 90715 TDAP VACCINE 7 YRS/> IM: CPT

## 2018-06-15 PROCEDURE — 99207 ZZC NO CHARGE NURSE ONLY: CPT

## 2018-08-01 ENCOUNTER — TRANSFERRED RECORDS (OUTPATIENT)
Dept: HEALTH INFORMATION MANAGEMENT | Facility: CLINIC | Age: 58
End: 2018-08-01

## 2018-12-18 ENCOUNTER — OFFICE VISIT (OUTPATIENT)
Dept: FAMILY MEDICINE | Facility: CLINIC | Age: 58
End: 2018-12-18
Payer: COMMERCIAL

## 2018-12-18 VITALS — HEART RATE: 76 BPM | SYSTOLIC BLOOD PRESSURE: 122 MMHG | DIASTOLIC BLOOD PRESSURE: 76 MMHG | OXYGEN SATURATION: 98 %

## 2018-12-18 DIAGNOSIS — Z80.8 FAMILY HISTORY OF SKIN CANCER: ICD-10-CM

## 2018-12-18 DIAGNOSIS — L82.0 INFLAMED SEBORRHEIC KERATOSIS: Primary | ICD-10-CM

## 2018-12-18 PROCEDURE — 17110 DESTRUCTION B9 LES UP TO 14: CPT | Performed by: FAMILY MEDICINE

## 2018-12-18 NOTE — LETTER
"    12/18/2018         RE: Aylin Harding  [address] [place] [address]        Dear Colleague,    Thank you for referring your patient, Aylin Harding, to the [facility]. Please see a copy of my visit note below.    [facility] - PRIMARY CARE SKIN    CC: Lesion(s)  SUBJECTIVE:   Aylin Harding is a(n) 58 year old female who presents to clinic today because of a mole on the left shoulder.    Issue One: Mole on left shoulder. It is occasionally itchy or irritated.  Enlarging: YES.    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    Refer to electronic medical record (EMR) for past medical history and medications.    INTEGUMENTARY/SKIN: POSITIVE for changing lesion  ROS: 14 point review of systems was negative except the symptoms listed above in the HPI.    This document serves as a record of the services and decisions personally performed and made by Landy Acevedo MD and was created by Miguel Ángel Ramos, a trained medical scribe, based on personal observations and provider statements to the medical scribe.  December 18, 2018 2:00 PM   Miguel Ángel Ramos    OBJECTIVE:   GENERAL: healthy, alert and no distress.  SKIN: Rai Skin Type - I.  Trunk and Arms examined. The dermatoscope was used to help evaluate pigmented lesions.  Skin Pertinent Findings:  Left posterior shoulder: 10 x 8 mm in size stuck-on appearing papules, raised, brown, coarse-textured, round lesion(s) most consistent with seborrheic keratoses.  Name : Liquid Nitrogen Cry-Ac Cryotherapy.  Indication : Irritated/Inflamed Benign Lesion.  Location(s) : left shoulder - x1.  Completed by : Erin Acevedo MD  Note : " Discussed natural history of lesion and treatment options. Prior to treatment, we discussed inflammation, tenderness post-procedure, the healing process, and the risks of pain, infection, scarring, blistering, and hypo-/hyperpigmentation after healing. Explained that these lesions may grow back and may need additional treatment or re-treatment. The patient expressed a desire to proceed with cryotherapy.    The lesion(s) were treated with liquid nitrogen Cry-Ac, five second freeze repeated twice with a pause to allow for the area to thaw.    Patient tolerated the procedure well and left in good condition.  Total number of lesions treated : 1.    ASSESSMENT:     Encounter Diagnoses   Name Primary?     Inflamed seborrheic keratosis Yes     Family history of skin cancer        PLAN:   Patient Instructions   CRYOTHERAPY POST-TREATMENT CARE INSTRUCTIONS  Liquid nitrogen is mildly uncomfortable when applied to the skin, but the discomfort rapidly subsides.    Post-Treatment:  You may experience burning and/or stinging immediately following the procedure. The discomfort from the procedure may persist over the next 12-24 hours. The area treated will look pinker and slightly swollen before the healing process begins. You may also notice redness, swelling, tenderness, weeping and crusts or scabs. Healing time is approximately 10-14 days.    Blister - You may or may notice blistering from the freezing. If you develop an uncomfortable blister from today's treatment, you may gently puncture this with a needle that has been cleaned with alcohol. However, do not remove the protective skin layer of the blister.    Scab - After a few days, you may notice scaliness or scab formation. Do not pick at the scabs because this may cause slower healing and a permanent scar.    The skin may appear temporarily darker at the treatment site, but this usually fades over a period of months, provided that the area is protected from the sun.    Care  of the areas treated:    Wash the area with a mild cleanser.    Gently pat dry.    Do not rub.     Keep protected from the sun during the healing process and for a full year following treatment as the skin continues to remodel during this time.    If you experience dryness or persistent burning, you may use Vaseline or Aquaphor ointment sparingly.    Do not use Neosporin, as many people eventually develop a medication allergy, that can easily be confused with an infection, to Neomycin.    Return if:  If there is any concern that the lesion has persisted, make an appointment for a re-check. Healing time does vary depending on your individual healing process and the area of the body treated. Most patients will be healed in one month.    Signs of Infection:  Thankfully this is rare. However if you notice persistent colored drainage, increasing pain, fever or other signs of infection, please call back at [phone].        TT: 20 minutes.  CT: 15 minutes.    The information in this document, created by the medical scribe for me, accurately reflects the services I personally performed and the decisions made by me. I have reviewed and approved this document for accuracy prior to leaving the patient care area.  December 18, 2018 2:00 PM  Landy Acevedo MD  [facility]    Again, thank you for allowing me to participate in the care of your patient.        Sincerely,        Landy Acevedo MD

## 2018-12-18 NOTE — PROGRESS NOTES
"[facility] - PRIMARY CARE SKIN    CC: Lesion(s)  SUBJECTIVE:   Aylin Harding is a(n) 58 year old female who presents to clinic today because of a mole on the left shoulder.    Issue One: Mole on left shoulder. It is occasionally itchy or irritated.  Enlarging: YES.    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    Refer to electronic medical record (EMR) for past medical history and medications.    INTEGUMENTARY/SKIN: POSITIVE for changing lesion  ROS: 14 point review of systems was negative except the symptoms listed above in the HPI.    This document serves as a record of the services and decisions personally performed and made by Landy Acevedo MD and was created by Miguel Ángel Ramos, a trained medical scribe, based on personal observations and provider statements to the medical scribe.  December 18, 2018 2:00 PM   Miguel Ángel Ramos    OBJECTIVE:   GENERAL: healthy, alert and no distress.  SKIN: Rai Skin Type - I.  Trunk and Arms examined. The dermatoscope was used to help evaluate pigmented lesions.  Skin Pertinent Findings:  Left posterior shoulder: 10 x 8 mm in size stuck-on appearing papules, raised, brown, coarse-textured, round lesion(s) most consistent with seborrheic keratoses.  Name : Liquid Nitrogen Cry-Ac Cryotherapy.  Indication : Irritated/Inflamed Benign Lesion.  Location(s) : left shoulder - x1.  Completed by : Erin Acevedo MD  Note : Discussed natural history of lesion and treatment options. Prior to treatment, we discussed inflammation, tenderness post-procedure, the healing process, and the risks of pain, infection, scarring, blistering, and hypo-/hyperpigmentation after healing. Explained " that these lesions may grow back and may need additional treatment or re-treatment. The patient expressed a desire to proceed with cryotherapy.    The lesion(s) were treated with liquid nitrogen Cry-Ac, five second freeze repeated twice with a pause to allow for the area to thaw.    Patient tolerated the procedure well and left in good condition.  Total number of lesions treated : 1.    ASSESSMENT:     Encounter Diagnoses   Name Primary?     Inflamed seborrheic keratosis Yes     Family history of skin cancer        PLAN:   Patient Instructions   CRYOTHERAPY POST-TREATMENT CARE INSTRUCTIONS  Liquid nitrogen is mildly uncomfortable when applied to the skin, but the discomfort rapidly subsides.    Post-Treatment:  You may experience burning and/or stinging immediately following the procedure. The discomfort from the procedure may persist over the next 12-24 hours. The area treated will look pinker and slightly swollen before the healing process begins. You may also notice redness, swelling, tenderness, weeping and crusts or scabs. Healing time is approximately 10-14 days.    Blister - You may or may notice blistering from the freezing. If you develop an uncomfortable blister from today's treatment, you may gently puncture this with a needle that has been cleaned with alcohol. However, do not remove the protective skin layer of the blister.    Scab - After a few days, you may notice scaliness or scab formation. Do not pick at the scabs because this may cause slower healing and a permanent scar.    The skin may appear temporarily darker at the treatment site, but this usually fades over a period of months, provided that the area is protected from the sun.    Care of the areas treated:    Wash the area with a mild cleanser.    Gently pat dry.    Do not rub.     Keep protected from the sun during the healing process and for a full year following treatment as the skin continues to remodel during this time.    If you  experience dryness or persistent burning, you may use Vaseline or Aquaphor ointment sparingly.    Do not use Neosporin, as many people eventually develop a medication allergy, that can easily be confused with an infection, to Neomycin.    Return if:  If there is any concern that the lesion has persisted, make an appointment for a re-check. Healing time does vary depending on your individual healing process and the area of the body treated. Most patients will be healed in one month.    Signs of Infection:  Thankfully this is rare. However if you notice persistent colored drainage, increasing pain, fever or other signs of infection, please call back at [phone].        TT: 20 minutes.  CT: 15 minutes.    The information in this document, created by the medical scribe for me, accurately reflects the services I personally performed and the decisions made by me. I have reviewed and approved this document for accuracy prior to leaving the patient care area.  December 18, 2018 2:00 PM  Landy Acevedo MD  [facility]

## 2018-12-18 NOTE — PATIENT INSTRUCTIONS
CRYOTHERAPY POST-TREATMENT CARE INSTRUCTIONS  Liquid nitrogen is mildly uncomfortable when applied to the skin, but the discomfort rapidly subsides.    Post-Treatment:  You may experience burning and/or stinging immediately following the procedure. The discomfort from the procedure may persist over the next 12-24 hours. The area treated will look pinker and slightly swollen before the healing process begins. You may also notice redness, swelling, tenderness, weeping and crusts or scabs. Healing time is approximately 10-14 days.    Blister - You may or may notice blistering from the freezing. If you develop an uncomfortable blister from today's treatment, you may gently puncture this with a needle that has been cleaned with alcohol. However, do not remove the protective skin layer of the blister.    Scab - After a few days, you may notice scaliness or scab formation. Do not pick at the scabs because this may cause slower healing and a permanent scar.    The skin may appear temporarily darker at the treatment site, but this usually fades over a period of months, provided that the area is protected from the sun.    Care of the areas treated:    Wash the area with a mild cleanser.    Gently pat dry.    Do not rub.     Keep protected from the sun during the healing process and for a full year following treatment as the skin continues to remodel during this time.    If you experience dryness or persistent burning, you may use Vaseline or Aquaphor ointment sparingly.    Do not use Neosporin, as many people eventually develop a medication allergy, that can easily be confused with an infection, to Neomycin.    Return if:  If there is any concern that the lesion has persisted, make an appointment for a re-check. Healing time does vary depending on your individual healing process and the area of the body treated. Most patients will be healed in one month.    Signs of Infection:  Thankfully this is rare. However if you notice  persistent colored drainage, increasing pain, fever or other signs of infection, please call back at [phone].

## 2019-01-23 ENCOUNTER — OFFICE VISIT (OUTPATIENT)
Dept: URGENT CARE | Facility: URGENT CARE | Age: 59
End: 2019-01-23
Payer: COMMERCIAL

## 2019-01-23 VITALS
TEMPERATURE: 98.3 F | HEART RATE: 108 BPM | RESPIRATION RATE: 16 BRPM | OXYGEN SATURATION: 96 % | DIASTOLIC BLOOD PRESSURE: 68 MMHG | SYSTOLIC BLOOD PRESSURE: 98 MMHG

## 2019-01-23 DIAGNOSIS — J47.0 BRONCHIECTASIS WITH ACUTE LOWER RESPIRATORY INFECTION (H): Primary | ICD-10-CM

## 2019-01-23 DIAGNOSIS — R06.2 WHEEZING: ICD-10-CM

## 2019-01-23 PROCEDURE — 94640 AIRWAY INHALATION TREATMENT: CPT | Performed by: PHYSICIAN ASSISTANT

## 2019-01-23 PROCEDURE — 99214 OFFICE O/P EST MOD 30 MIN: CPT | Mod: 25 | Performed by: PHYSICIAN ASSISTANT

## 2019-01-23 RX ORDER — IPRATROPIUM BROMIDE AND ALBUTEROL SULFATE 2.5; .5 MG/3ML; MG/3ML
3 SOLUTION RESPIRATORY (INHALATION) ONCE
Status: COMPLETED | OUTPATIENT
Start: 2019-01-23 | End: 2019-01-23

## 2019-01-23 RX ORDER — PREDNISONE 20 MG/1
TABLET ORAL
Qty: 18 TABLET | Refills: 0 | Status: SHIPPED | OUTPATIENT
Start: 2019-01-23 | End: 2019-10-02

## 2019-01-23 RX ORDER — IPRATROPIUM BROMIDE AND ALBUTEROL SULFATE 2.5; .5 MG/3ML; MG/3ML
SOLUTION RESPIRATORY (INHALATION)
Qty: 3 ML | Refills: 0
Start: 2019-01-23 | End: 2019-01-23

## 2019-01-23 RX ADMIN — IPRATROPIUM BROMIDE AND ALBUTEROL SULFATE 3 ML: 2.5; .5 SOLUTION RESPIRATORY (INHALATION) at 15:45

## 2019-01-23 NOTE — PROGRESS NOTES
Patient presents with:  Urgent Care: Pt c/o hoarse voice, cough, chest congestion and sob for 3 days      SUBJECTIVE:   Aylin Harding is a 58 year old female presenting with a chief complaint of  1) cough and shortness of breath for 3 days.    Pain with cough and deep breathing.    Has been using her albuterol.  Has not used nebulizer   Onset of symptoms was 3 day(s) ago.  Course of illness is worsening.    Severity moderate  Current and Associated symptoms: as above    Predisposing factors include bronchiectasis.    Has a new grandson, 6 months old, has bronchiolitis (Jean Marie Mejias)    Past Medical History:   Diagnosis Date     Bronchiectasis 9/5/08     Hormone replacement therapy 2011     Osteoporosis 2001    followed by PCP (Dr. Springer)     Pneumonia, organism unspecified(486)      Uncomplicated asthma      Patient Active Problem List   Diagnosis     Bronchiectasis (H)     CARDIOVASCULAR SCREENING; LDL GOAL LESS THAN 160     Osteoporosis     Hormone replacement therapy     Uncomplicated asthma     Right-sided low back pain without sciatica     Social History     Tobacco Use     Smoking status: Never Smoker     Smokeless tobacco: Never Used   Substance Use Topics     Alcohol use: Yes     Alcohol/week: 0.0 oz     Comment: rare socially       ROS:  CONSTITUTIONAL:as per HPI  INTEGUMENTARY/SKIN: NEGATIVE for worrisome rashes, moles or lesions  EYES: NEGATIVE for vision changes or irritation  ENT/MOUTH: NEGATIVE for ear, mouth and throat problems  RESP:as per HPI  CV: NEGATIVE for chest pain, palpitations or peripheral edema  GI: NEGATIVE for nausea, abdominal pain, heartburn, or change in bowel habits  MUSCULOSKELETAL: NEGATIVE for significant arthralgias or myalgia  Review of systems negative except as stated above.    OBJECTIVE  :BP 98/68   Pulse 108   Temp 98.3  F (36.8  C) (Oral)   Resp 16   SpO2 96%   GENERAL APPEARANCE: healthy, alert and mild distress secondary to wheezing and shortness of  breath  EYES: EOMI,  PERRL, conjunctiva clear  HENT: ear canals and TM's normal.  Nose and mouth without ulcers, erythema or lesions  NECK: supple, nontender, no lymphadenopathy  RESP: wheezing and rhonchi throughout which improve but do not clear with neb in clinic  CV: regular rates and rhythm, normal S1 S2, no murmur noted  SKIN: no suspicious lesions or rashes    (J47.0) Bronchiectasis with acute lower respiratory infection (H)  (primary encounter diagnosis)  Comment:   Plan: predniSONE (DELTASONE) 20 MG tablet,         amoxicillin-clavulanate (AUGMENTIN) 875-125 MG         tablet            (R06.2) Wheezing  Comment:   Plan: INHALATION/NEBULIZER TREATMENT, INITIAL,         ipratropium - albuterol 0.5 mg/2.5 mg/3 mL         (DUONEB) neb solution 3 mL, predniSONE         (DELTASONE) 20 MG tablet, ALBUTEROL/IPRATROPIUM        3ML NEB - .001, DISCONTINUED: ipratropium         - albuterol 0.5 mg/2.5 mg/3 mL (DUONEB) 0.5-2.5        (3) MG/3ML neb solution          F/U with PCP should symptoms persist or worsen.

## 2019-03-24 ENCOUNTER — OFFICE VISIT (OUTPATIENT)
Dept: URGENT CARE | Facility: URGENT CARE | Age: 59
End: 2019-03-24
Payer: COMMERCIAL

## 2019-03-24 VITALS
DIASTOLIC BLOOD PRESSURE: 72 MMHG | TEMPERATURE: 99.9 F | RESPIRATION RATE: 24 BRPM | SYSTOLIC BLOOD PRESSURE: 110 MMHG | OXYGEN SATURATION: 93 % | BODY MASS INDEX: 25.4 KG/M2 | HEART RATE: 112 BPM | WEIGHT: 172 LBS

## 2019-03-24 DIAGNOSIS — R06.2 WHEEZE: Primary | ICD-10-CM

## 2019-03-24 DIAGNOSIS — R50.9 FEVER AND CHILLS: ICD-10-CM

## 2019-03-24 DIAGNOSIS — J10.1 INFLUENZA B: ICD-10-CM

## 2019-03-24 LAB
FLUAV+FLUBV AG SPEC QL: NEGATIVE
FLUAV+FLUBV AG SPEC QL: POSITIVE
SPECIMEN SOURCE: ABNORMAL

## 2019-03-24 PROCEDURE — 99214 OFFICE O/P EST MOD 30 MIN: CPT | Mod: 25 | Performed by: FAMILY MEDICINE

## 2019-03-24 PROCEDURE — 94640 AIRWAY INHALATION TREATMENT: CPT | Performed by: FAMILY MEDICINE

## 2019-03-24 PROCEDURE — 87804 INFLUENZA ASSAY W/OPTIC: CPT | Performed by: FAMILY MEDICINE

## 2019-03-24 RX ORDER — IPRATROPIUM BROMIDE AND ALBUTEROL SULFATE 2.5; .5 MG/3ML; MG/3ML
1 SOLUTION RESPIRATORY (INHALATION) EVERY 6 HOURS PRN
Qty: 1 BOX | Refills: 1 | Status: SHIPPED | OUTPATIENT
Start: 2019-03-24 | End: 2023-01-07

## 2019-03-24 RX ORDER — PREDNISONE 20 MG/1
20 TABLET ORAL 2 TIMES DAILY
Qty: 8 TABLET | Refills: 0 | Status: SHIPPED | OUTPATIENT
Start: 2019-03-25 | End: 2019-03-29

## 2019-03-24 RX ORDER — PREDNISONE 20 MG/1
TABLET ORAL
Qty: 2 TABLET | Refills: 0
Start: 2019-03-24 | End: 2019-03-25

## 2019-03-24 RX ORDER — ALBUTEROL SULFATE 0.83 MG/ML
2.5 SOLUTION RESPIRATORY (INHALATION) ONCE
Status: COMPLETED | OUTPATIENT
Start: 2019-03-24 | End: 2019-03-24

## 2019-03-24 RX ORDER — OSELTAMIVIR PHOSPHATE 75 MG/1
75 CAPSULE ORAL 2 TIMES DAILY
Qty: 10 CAPSULE | Refills: 0 | Status: SHIPPED | OUTPATIENT
Start: 2019-03-24 | End: 2019-03-29

## 2019-03-24 RX ADMIN — ALBUTEROL SULFATE 2.5 MG: 0.83 SOLUTION RESPIRATORY (INHALATION) at 09:30

## 2019-03-24 NOTE — PROGRESS NOTES
SUBJECTIVE: Aylin Harding is a 58 year old female presenting with a chief complaint of fever, nasal congestion, cough  and SOB and wheeze.  Onset of symptoms was day(s) ago.  Course of illness is same.    Severity moderate  Current and Associated symptoms: stuffy nose and cough - non-productive  Treatment measures tried include Tylenol/Ibuprofen.  Predisposing factors include None.    Past Medical History:   Diagnosis Date     Bronchiectasis 9/5/08     Hormone replacement therapy 2011     Osteoporosis 2001    followed by PCP (Dr. Springer)     Pneumonia, organism unspecified(486)      Uncomplicated asthma      Allergies   Allergen Reactions     Mold      Social History     Tobacco Use     Smoking status: Never Smoker     Smokeless tobacco: Never Used   Substance Use Topics     Alcohol use: Yes     Alcohol/week: 0.0 oz     Comment: rare socially       ROS:  SKIN: no rash  GI: no vomiting    OBJECTIVE:  /72 (Cuff Size: Adult Regular)   Pulse 112   Temp 99.9  F (37.7  C) (Oral)   Resp 24   Wt 78 kg (172 lb)   SpO2 93%   BMI 25.40 kg/m  GENERAL APPEARANCE: healthy, alert and no distress  EYES: EOMI,  PERRL, conjunctiva clear  HENT: ear canals and TM's normal.  Nose and mouth without ulcers, erythema or lesions  NECK: supple, nontender, no lymphadenopathy  RESP: expiratory wheezes throughout  CV: regular rates and rhythm, normal S1 S2, no murmur noted  SKIN: no suspicious lesions or rashes      ICD-10-CM    1. Wheeze R06.2 predniSONE (DELTASONE) 20 MG tablet     albuterol (PROVENTIL) neb solution 2.5 mg     INHALATION/NEBULIZER TREATMENT, INITIAL     Influenza A/B antigen     ipratropium - albuterol 0.5 mg/2.5 mg/3 mL (DUONEB) 0.5-2.5 (3) MG/3ML neb solution     predniSONE (DELTASONE) 20 MG tablet   2. Fever and chills R50.9 Influenza A/B antigen   3. Influenza B J10.1 oseltamivir (TAMIFLU) 75 MG capsule     Pt improved after neb  Fluids/Rest, f/u if worse/not any better

## 2019-03-24 NOTE — PATIENT INSTRUCTIONS
Patient Education     Influenza (Adult)    Influenza is also called the flu. It is a viral illness that affects the air passages of your lungs. It is different from the common cold. The flu can easily be passed from one to person to another. It may be spread through the air by coughing and sneezing. Or it can be spread by touching the sick person and then touching your own eyes, nose, or mouth.  The flu starts 1 to 3 days after you are exposed to the flu virus. It may last for 1 to 2 weeks but many people feel tired or fatigued for many weeks afterward. You usually don t need to take antibiotics unless you have a complication. This might be an ear or sinus infection or pneumonia.  Symptoms of the flu may be mild or severe. They can include extreme tiredness (wanting to stay in bed all day), chills, fevers, muscle aches, soreness with eye movement, headache, and a dry, hacking cough.  Home care  Follow these guidelines when caring for yourself at home:    Avoid being around cigarette smoke, whether yours or other people s.    Acetaminophen or ibuprofen will help ease your fever, muscle aches, and headache. Don t give aspirin to anyone younger than 18 who has the flu. Aspirin can harm the liver.    Nausea and loss of appetite are common with the flu. Eat light meals. Drink 6 to 8 glasses of liquids every day. Good choices are water, sport drinks, soft drinks without caffeine, juices, tea, and soup. Extra fluids will also help loosen secretions in your nose and lungs.    Over-the-counter cold medicines will not make the flu go away faster. But the medicines may help with coughing, sore throat, and congestion in your nose and sinuses. Don t use a decongestant if you have high blood pressure.    Stay home until your fever has been gone for at least 24 hours without using medicine to reduce fever.  Follow-up care  Follow up with your healthcare provider, or as advised, if you are not getting better over the next  week.  If you are age 65 or older, talk with your provider about getting a pneumococcal vaccine every 5 years. You should also get this vaccine if you have chronic asthma or COPD. All adults should get a flu vaccine every fall. Ask your provider about this.  When to seek medical advice  Call your healthcare provider right away if any of these occur:    Cough with lots of colored mucus (sputum) or blood in your mucus    Chest pain, shortness of breath, wheezing, or trouble breathing    Severe headache, or face, neck, or ear pain    New rash with fever    Fever of 100.4 F (38 C) or higher, or as directed by your healthcare provider    Confusion, behavior change, or seizure    Severe weakness or dizziness    You get a new fever or cough after getting better for a few days  Date Last Reviewed: 1/1/2017 2000-2018 [employer]. [address] [place] [address], [place] [address]. All rights reserved. This information is not intended as a substitute for professional medical care. Always follow your healthcare professional's instructions.

## 2019-03-28 ENCOUNTER — ANCILLARY PROCEDURE (OUTPATIENT)
Dept: GENERAL RADIOLOGY | Facility: CLINIC | Age: 59
End: 2019-03-28
Attending: FAMILY MEDICINE
Payer: COMMERCIAL

## 2019-03-28 ENCOUNTER — OFFICE VISIT (OUTPATIENT)
Dept: URGENT CARE | Facility: URGENT CARE | Age: 59
End: 2019-03-28
Payer: COMMERCIAL

## 2019-03-28 ENCOUNTER — TELEPHONE (OUTPATIENT)
Dept: OTHER | Facility: CLINIC | Age: 59
End: 2019-03-28

## 2019-03-28 VITALS
RESPIRATION RATE: 16 BRPM | TEMPERATURE: 98 F | SYSTOLIC BLOOD PRESSURE: 118 MMHG | DIASTOLIC BLOOD PRESSURE: 82 MMHG | OXYGEN SATURATION: 97 % | HEART RATE: 80 BPM

## 2019-03-28 DIAGNOSIS — J47.0 BRONCHIECTASIS WITH ACUTE LOWER RESPIRATORY INFECTION (H): ICD-10-CM

## 2019-03-28 DIAGNOSIS — R06.2 WHEEZE: ICD-10-CM

## 2019-03-28 DIAGNOSIS — R05.9 COUGH: Primary | ICD-10-CM

## 2019-03-28 PROCEDURE — 94640 AIRWAY INHALATION TREATMENT: CPT | Performed by: FAMILY MEDICINE

## 2019-03-28 PROCEDURE — 71046 X-RAY EXAM CHEST 2 VIEWS: CPT

## 2019-03-28 PROCEDURE — 99214 OFFICE O/P EST MOD 30 MIN: CPT | Mod: 25 | Performed by: FAMILY MEDICINE

## 2019-03-28 RX ORDER — PREDNISONE 10 MG/1
40 TABLET ORAL ONCE
Status: COMPLETED | OUTPATIENT
Start: 2019-03-28 | End: 2019-03-28

## 2019-03-28 RX ORDER — PREDNISONE 20 MG/1
TABLET ORAL
Qty: 2 TABLET | Refills: 0 | Status: SHIPPED | OUTPATIENT
Start: 2019-03-28 | End: 2019-03-28

## 2019-03-28 RX ORDER — IPRATROPIUM BROMIDE AND ALBUTEROL SULFATE 2.5; .5 MG/3ML; MG/3ML
3 SOLUTION RESPIRATORY (INHALATION) ONCE
Status: COMPLETED | OUTPATIENT
Start: 2019-03-28 | End: 2019-03-28

## 2019-03-28 RX ORDER — CEFDINIR 300 MG/1
300 CAPSULE ORAL 2 TIMES DAILY
Qty: 20 CAPSULE | Refills: 0 | Status: SHIPPED | OUTPATIENT
Start: 2019-03-28 | End: 2019-10-02

## 2019-03-28 RX ORDER — PREDNISONE 20 MG/1
TABLET ORAL
Qty: 20 TABLET | Refills: 0 | Status: SHIPPED | OUTPATIENT
Start: 2019-03-28 | End: 2019-10-02

## 2019-03-28 RX ADMIN — PREDNISONE 40 MG: 10 TABLET ORAL at 13:23

## 2019-03-28 RX ADMIN — IPRATROPIUM BROMIDE AND ALBUTEROL SULFATE 3 ML: 2.5; .5 SOLUTION RESPIRATORY (INHALATION) at 13:25

## 2019-03-28 NOTE — TELEPHONE ENCOUNTER
Patient was treated for the flu on 3/24/19 and is calling as she does not feel better.  She is on her last day of medication.  Should she see PCP today or go to urgent care.  Records in [facility].  Please advise,  Dagmar Adrian, JOVANNI, BSN

## 2019-03-28 NOTE — PROGRESS NOTES
SUBJECTIVE: Aylin Harding is a 58 year old female presenting with a chief complaint of cough/wheeze.  Onset of symptoms was day(s) ago.  Course of illness is worsening.    Severity moderate  Current and Associated symptoms: cough - non-productive  Treatment measures tried include see med list.  Predisposing factors include HX of Bronchiectisis.    Past Medical History:   Diagnosis Date     Bronchiectasis 9/5/08     Hormone replacement therapy 2011     Osteoporosis 2001    followed by PCP (Dr. Springer)     Pneumonia, organism unspecified(486)      Uncomplicated asthma        Past Surgical History:   Procedure Laterality Date     C ORAL SURGERY PROCEDURE       CARPAL TUNNEL RELEASE RT/LT  2009       Family History   Problem Relation Age of Onset     Arthritis Mother         b:1937     Arthritis Father         b:1934     Family History Negative Sister         2 sisters b:1958,1962     Family History Negative Brother         2 brothers b:1960,1963       Social History     Tobacco Use     Smoking status: Never Smoker     Smokeless tobacco: Never Used   Substance Use Topics     Alcohol use: Yes     Alcohol/week: 0.0 oz     Comment: rare socially        Allergies   Allergen Reactions     Mold      ROS:  SKIN: no rash  GI: no vomiting    OBJECTIVE:  /82   Pulse 80   Temp 98  F (36.7  C) (Oral)   Resp 16   SpO2 97%    GENERAL APPEARANCE: healthy, alert and no distress  EYES: EOMI,  PERRL, conjunctiva clear  HENT: ear canals and TM's normal.  Nose and mouth without ulcers, erythema or lesions  NECK: supple, nontender, no lymphadenopathy  RESP: expiratory wheezes throughout  CV: regular rates and rhythm, normal S1 S2, no murmur noted  SKIN: no suspicious lesions or rashes      ICD-10-CM    1. Cough R05 XR Chest 2 Views     predniSONE (DELTASONE) 20 MG tablet     ALBUTEROL/IPRATROPIUM 3ML NEB   2. Bronchiectasis with acute lower respiratory infection (H) J47.0 XR Chest 2 Views     ipratropium - albuterol 0.5  mg/2.5 mg/3 mL (DUONEB) neb solution 3 mL     INHALATION/NEBULIZER TREATMENT, INITIAL     cefdinir (OMNICEF) 300 MG capsule     predniSONE (DELTASONE) 20 MG tablet     ALBUTEROL/IPRATROPIUM 3ML NEB     predniSONE (DELTASONE) tablet 40 mg     DISCONTINUED: predniSONE (DELTASONE) 20 MG tablet   3. Wheeze R06.2 ipratropium - albuterol 0.5 mg/2.5 mg/3 mL (DUONEB) neb solution 3 mL     INHALATION/NEBULIZER TREATMENT, INITIAL     predniSONE (DELTASONE) 20 MG tablet     ALBUTEROL/IPRATROPIUM 3ML NEB     predniSONE (DELTASONE) tablet 40 mg     DISCONTINUED: predniSONE (DELTASONE) 20 MG tablet     Decreased wheezing after nebs and prednisone  Fluids/Rest, f/u if worse/not any better

## 2019-06-15 ENCOUNTER — OFFICE VISIT (OUTPATIENT)
Dept: URGENT CARE | Facility: URGENT CARE | Age: 59
End: 2019-06-15
Payer: COMMERCIAL

## 2019-06-15 ENCOUNTER — ANCILLARY PROCEDURE (OUTPATIENT)
Dept: GENERAL RADIOLOGY | Facility: CLINIC | Age: 59
End: 2019-06-15
Attending: NURSE PRACTITIONER
Payer: COMMERCIAL

## 2019-06-15 VITALS
TEMPERATURE: 98.4 F | BODY MASS INDEX: 25.4 KG/M2 | WEIGHT: 172 LBS | SYSTOLIC BLOOD PRESSURE: 115 MMHG | HEART RATE: 110 BPM | OXYGEN SATURATION: 95 % | DIASTOLIC BLOOD PRESSURE: 70 MMHG | RESPIRATION RATE: 28 BRPM

## 2019-06-15 DIAGNOSIS — R06.02 SHORTNESS OF BREATH: Primary | ICD-10-CM

## 2019-06-15 DIAGNOSIS — J47.1 BRONCHIECTASIS WITH ACUTE EXACERBATION (H): ICD-10-CM

## 2019-06-15 DIAGNOSIS — R06.02 SHORTNESS OF BREATH: ICD-10-CM

## 2019-06-15 PROCEDURE — 99214 OFFICE O/P EST MOD 30 MIN: CPT | Performed by: NURSE PRACTITIONER

## 2019-06-15 PROCEDURE — 71046 X-RAY EXAM CHEST 2 VIEWS: CPT

## 2019-06-15 RX ORDER — CEFDINIR 300 MG/1
300 CAPSULE ORAL 2 TIMES DAILY
Qty: 20 CAPSULE | Refills: 0 | Status: SHIPPED | OUTPATIENT
Start: 2019-06-15 | End: 2019-10-02

## 2019-06-15 RX ORDER — PREDNISONE 10 MG/1
TABLET ORAL
Qty: 39 TABLET | Refills: 0 | Status: SHIPPED | OUTPATIENT
Start: 2019-06-15 | End: 2019-10-02

## 2019-06-15 NOTE — PROGRESS NOTES
SUBJECTIVE:   Aylin Harding is a 58 year old female presenting with a chief complaint of shortness of breath and wheezing that started today.  She has a history of bronchiectasis with frequent exacerbations.  She follows with a pulmonary doctor.  She states that this feels similar to her past bronchiectasis exacerbations.  Denies chest pain, fever, sore throat.  She has a non productive cough.  She tried a duoneb prior to coming in and took 20 mg prednisone.      Past Medical History:   Diagnosis Date     Bronchiectasis 9/5/08     Hormone replacement therapy 2011     Osteoporosis 2001    followed by PCP (Dr. Springer)     Pneumonia, organism unspecified(486)      Uncomplicated asthma      Current Outpatient Medications   Medication Sig Dispense Refill     albuterol (2.5 MG/3ML) 0.083% neb solution Take 1 vial (2.5 mg) by nebulization every 4 hours as needed for shortness of breath / dyspnea or wheezing 1 Box 0     albuterol (PROVENTIL HFA) 108 (90 BASE) MCG/ACT Inhaler Inhale 2 puffs into the lungs every 6 hours 1 Inhaler 0     cetirizine (ZYRTEC) 10 MG tablet Take 1 tablet (10 mg) by mouth every evening 30 tablet 1     Fexofenadine HCl (MUCINEX ALLERGY PO)        fluticasone-salmeterol (ADVAIR) 250-50 MCG/DOSE diskus inhaler Inhale 1 puff into the lungs 2 times daily 1 Inhaler 1     INCRUSE ELLIPTA 62.5 MCG/INH Inhaler        levofloxacin (LEVAQUIN) 500 MG tablet Take 1 tablet (500 mg) by mouth daily 10 tablet 0     levofloxacin (LEVAQUIN) 500 MG tablet Take 1 tablet (500 mg) by mouth daily 14 tablet 0     predniSONE (DELTASONE) 10 MG tablet 6 tabs PO daily times 5 days then  5 tabs PO daily times 5 days then  4 tabs PO daily times 5 days then  3 tabs PO daily times 5 days then   2 tabs PO daily times 5 days then   1 tab PO daily times 5 days 105 tablet 0     predniSONE (DELTASONE) 20 MG tablet 3 po QD for 3 days, then 2 po QD for 3 days, then 1 po QD for 3 days. 18 tablet 0     predniSONE (DELTASONE) 20 MG  tablet TK 1 T PO BID FOR 5 DAYS THEN 1 D FOR 3 DAYS THEN 1/2 D FOR 2 DAYS  0     predniSONE (DELTASONE) 20 MG tablet 3 po QD for 3 days, then 2 po QD for 3 days, then 1 po QD for 3 days 18 tablet 0     tiotropium (SPIRIVA HANDIHALER) 18 MCG inhalation capsule Inhale 1 capsule (18 mcg) into the lungs daily Inhale contents of one capsule daily 90 capsule 3     TRELEGY ELLIPTA 100-62.5-25 MCG/INH oral inhaler        albuterol (2.5 MG/3ML) 0.083% neb solution Take 1 vial (2.5 mg) by nebulization once for 1 dose 3 mL 0     ipratropium - albuterol 0.5 mg/2.5 mg/3 mL (DUONEB) 0.5-2.5 (3) MG/3ML neb solution Take 1 vial (3 mLs) by nebulization every 6 hours as needed for shortness of breath / dyspnea or wheezing 1 Box 1     Social History     Tobacco Use     Smoking status: Never Smoker     Smokeless tobacco: Never Used   Substance Use Topics     Alcohol use: Yes     Alcohol/week: 0.0 oz     Comment: rare socially       ROS:  Review of systems negative except as stated above.    OBJECTIVE:  /70 (BP Location: Right arm, Patient Position: Sitting, Cuff Size: Adult Regular)   Pulse 110   Temp 98.4  F (36.9  C) (Oral)   Resp 28   Wt 78 kg (172 lb)   SpO2 95%   BMI 25.40 kg/m    GENERAL APPEARANCE: healthy, alert and no distress, breathing slightly rapidly  NECK: supple, nontender, no lymphadenopathy  RESP: lungs with expiratory wheezing in bases bilaterally, but heard greater on the right side.    CV: regular rates and rhythm, normal S1 S2, no murmur noted  ABDOMEN:  soft, nontender, no HSM or masses and bowel sounds normal  SKIN: no suspicious lesions or rashes    Chest x-ray:   XR CHEST 2 VW   6/15/2019 7:07 PM      HISTORY: Shortness of breath     COMPARISON: Film dated 3/28/2018     FINDINGS: The heart is negative.  Lungs are hyperinflated. There are  fibrotic changes throughout both lungs. There is a new patchy opacity  in the right upper lobe which may be due to an area of pneumonia.. The  pulmonary  vasculature is normal.  The bones and soft tissues are  unremarkable.                                                                      IMPRESSION:   1. Hyperinflated and fibrotic lungs consistent with emphysematous  change.  2. New opacity in the right upper lobe. This may be due to an area of  infiltrate or atelectasis. It could be due to pneumonia.        ASSESSMENT:  Bronchiectasis exacerbation, possible CAP    PLAN:  1. Cefdinir 300 mg po BID X 10 days, prednisone taper, continue duonebs Q 4 hours, come into the ED if worsening.     See orders in Epic    ELIU Wooten, CNP

## 2019-09-29 ENCOUNTER — HEALTH MAINTENANCE LETTER (OUTPATIENT)
Age: 59
End: 2019-09-29

## 2019-10-02 ENCOUNTER — OFFICE VISIT (OUTPATIENT)
Dept: URGENT CARE | Facility: URGENT CARE | Age: 59
End: 2019-10-02
Payer: COMMERCIAL

## 2019-10-02 VITALS
HEART RATE: 89 BPM | DIASTOLIC BLOOD PRESSURE: 70 MMHG | SYSTOLIC BLOOD PRESSURE: 100 MMHG | TEMPERATURE: 97.3 F | RESPIRATION RATE: 22 BRPM | OXYGEN SATURATION: 96 % | WEIGHT: 170 LBS | BODY MASS INDEX: 25.1 KG/M2

## 2019-10-02 DIAGNOSIS — J47.1 BRONCHIECTASIS WITH ACUTE EXACERBATION (H): Primary | ICD-10-CM

## 2019-10-02 PROCEDURE — 99213 OFFICE O/P EST LOW 20 MIN: CPT | Performed by: PHYSICIAN ASSISTANT

## 2019-10-02 RX ORDER — PREDNISONE 20 MG/1
TABLET ORAL
Qty: 18 TABLET | Refills: 0 | Status: SHIPPED | OUTPATIENT
Start: 2019-10-02 | End: 2019-12-27

## 2019-10-02 NOTE — PROGRESS NOTES
Patient presents with:  Urgent Care: cough, sob and running stuffy nose.     SUBJECTIVE:   Aylin Harding is a 59 year old female presenting with a chief complaint of   1) cough for the past week, worsening now with worsening shortness of breath and wheezing.  Little relief with nebulizer and mucinex.    Around grandchild who has been ill.     This is her usual exacerbation that typically responds to antibiotics and oral steroids, so long as she continues her nebulizer treatments.      Denies any fevers.      Past Medical History:   Diagnosis Date     Bronchiectasis 9/5/08     Hormone replacement therapy 2011     Osteoporosis 2001    followed by PCP (Dr. Springer)     Pneumonia, organism unspecified(486)      Uncomplicated asthma      Patient Active Problem List   Diagnosis     Bronchiectasis (H)     CARDIOVASCULAR SCREENING; LDL GOAL LESS THAN 160     Osteoporosis     Hormone replacement therapy     Uncomplicated asthma     Right-sided low back pain without sciatica     Social History     Tobacco Use     Smoking status: Never Smoker     Smokeless tobacco: Never Used   Substance Use Topics     Alcohol use: Yes     Alcohol/week: 0.0 standard drinks     Comment: rare socially       ROS:  CONSTITUTIONAL:NEGATIVE for fever, chills, change in weight  INTEGUMENTARY/SKIN: NEGATIVE for worrisome rashes, moles or lesions  EYES: NEGATIVE for vision changes or irritation  ENT/MOUTH: NEGATIVE for ear, mouth and throat problems  RESP:as per HPI  CV: NEGATIVE for chest pain, palpitations or peripheral edema  GI: NEGATIVE for nausea, abdominal pain, heartburn, or change in bowel habits  MUSCULOSKELETAL: NEGATIVE for significant arthralgias or myalgia  NEURO: NEGATIVE for weakness, dizziness or paresthesias  Review of systems negative except as stated above.    OBJECTIVE  :/70   Pulse 89   Temp 97.3  F (36.3  C) (Oral)   Resp 22   Wt 77.1 kg (170 lb)   SpO2 96%   BMI 25.10 kg/m    GENERAL APPEARANCE: healthy, alert  and no distress  EYES: EOMI,  PERRL, conjunctiva clear  HENT: ear canals and TM's normal.  Nose and mouth without ulcers, erythema or lesions  NECK: supple, nontender, no lymphadenopathy  RESP:wheezing throughout  CV: regular rates and rhythm, normal S1 S2, no murmur noted  NEURO: Normal strength and tone, sensory exam grossly normal,  normal speech and mentation  SKIN: no suspicious lesions or rashes    (J47.1) Bronchiectasis with acute exacerbation (H)  (primary encounter diagnosis)  Comment:   Plan: amoxicillin-clavulanate (AUGMENTIN) 875-125 MG         tablet, predniSONE (DELTASONE) 20 MG tablet          Continue nebs at home    Follow up with pulmonology   Patient expresses understanding and agreement with the assessment and plan as above.

## 2019-10-02 NOTE — PATIENT INSTRUCTIONS
(J47.1) Bronchiectasis with acute exacerbation (H)  (primary encounter diagnosis)  Comment:   Plan: amoxicillin-clavulanate (AUGMENTIN) 875-125 MG         tablet, predniSONE (DELTASONE) 20 MG tablet            Follow up with pulmonology

## 2019-10-29 ENCOUNTER — TRANSFERRED RECORDS (OUTPATIENT)
Dept: HEALTH INFORMATION MANAGEMENT | Facility: CLINIC | Age: 59
End: 2019-10-29

## 2019-12-23 ENCOUNTER — OFFICE VISIT (OUTPATIENT)
Dept: URGENT CARE | Facility: URGENT CARE | Age: 59
End: 2019-12-23
Payer: COMMERCIAL

## 2019-12-23 VITALS
DIASTOLIC BLOOD PRESSURE: 78 MMHG | BODY MASS INDEX: 25.1 KG/M2 | TEMPERATURE: 97.4 F | HEART RATE: 86 BPM | WEIGHT: 170 LBS | SYSTOLIC BLOOD PRESSURE: 118 MMHG | OXYGEN SATURATION: 97 % | RESPIRATION RATE: 16 BRPM

## 2019-12-23 DIAGNOSIS — R06.2 WHEEZE: ICD-10-CM

## 2019-12-23 DIAGNOSIS — R50.9 FEVER WITH CHILLS: Primary | ICD-10-CM

## 2019-12-23 LAB
FLUAV+FLUBV AG SPEC QL: NEGATIVE
FLUAV+FLUBV AG SPEC QL: NEGATIVE
SPECIMEN SOURCE: NORMAL

## 2019-12-23 PROCEDURE — 99213 OFFICE O/P EST LOW 20 MIN: CPT | Performed by: FAMILY MEDICINE

## 2019-12-23 PROCEDURE — 87804 INFLUENZA ASSAY W/OPTIC: CPT | Performed by: FAMILY MEDICINE

## 2019-12-23 RX ORDER — AZITHROMYCIN 250 MG/1
TABLET, FILM COATED ORAL
Qty: 6 TABLET | Refills: 0 | Status: SHIPPED | OUTPATIENT
Start: 2019-12-23 | End: 2019-12-28

## 2019-12-23 RX ORDER — PREDNISONE 20 MG/1
20 TABLET ORAL 2 TIMES DAILY
Qty: 10 TABLET | Refills: 0 | Status: SHIPPED | OUTPATIENT
Start: 2019-12-23 | End: 2019-12-28

## 2019-12-23 NOTE — PROGRESS NOTES
SUBJECTIVE: Aylin Harding is a 59 year old female presenting with a chief complaint of fever, cough  and wheeze.  Onset of symptoms was 1 week(s) ago.  Course of illness is same.    Severity moderate  Current and Associated symptoms: cough   Treatment measures tried include Inhaler.  Predisposing factors include see med history.    Past Medical History:   Diagnosis Date     Bronchiectasis 9/5/08     Hormone replacement therapy 2011     Osteoporosis 2001    followed by PCP (Dr. Springer)     Pneumonia, organism unspecified(486)      Uncomplicated asthma      Allergies   Allergen Reactions     Mold      Social History     Tobacco Use     Smoking status: Never Smoker     Smokeless tobacco: Never Used   Substance Use Topics     Alcohol use: Yes     Alcohol/week: 0.0 standard drinks     Comment: rare socially       ROS:  SKIN: no rash  GI: no vomiting    OBJECTIVE:  /78   Pulse 86   Temp 97.4  F (36.3  C) (Oral)   Resp 16   Wt 77.1 kg (170 lb)   SpO2 97%   BMI 25.10 kg/m  GENERAL APPEARANCE: healthy, alert and no distress  EYES: EOMI,  PERRL, conjunctiva clear  HENT: ear canals and TM's normal.  Nose and mouth without ulcers, erythema or lesions  NECK: supple, nontender, no lymphadenopathy  RESP: expiratory wheezes throughout  SKIN: no suspicious lesions or rashes      ICD-10-CM    1. Fever with chills R50.9 Influenza A/B antigen     azithromycin (ZITHROMAX) 250 MG tablet   2. Wheeze R06.2 predniSONE (DELTASONE) 20 MG tablet       Fluids/Rest, f/u if worse/not any better

## 2019-12-27 ENCOUNTER — HOSPITAL ENCOUNTER (OUTPATIENT)
Dept: MAMMOGRAPHY | Facility: CLINIC | Age: 59
Discharge: HOME | End: 2019-12-27
Attending: OBSTETRICS & GYNECOLOGY | Admitting: OBSTETRICS & GYNECOLOGY
Payer: COMMERCIAL

## 2019-12-27 ENCOUNTER — OFFICE VISIT (OUTPATIENT)
Dept: OBGYN | Facility: CLINIC | Age: 59
End: 2019-12-27
Payer: COMMERCIAL

## 2019-12-27 VITALS
WEIGHT: 169 LBS | SYSTOLIC BLOOD PRESSURE: 110 MMHG | DIASTOLIC BLOOD PRESSURE: 70 MMHG | BODY MASS INDEX: 25.03 KG/M2 | HEIGHT: 69 IN

## 2019-12-27 DIAGNOSIS — Z12.31 VISIT FOR SCREENING MAMMOGRAM: ICD-10-CM

## 2019-12-27 DIAGNOSIS — Z01.419 ENCOUNTER FOR GYNECOLOGICAL EXAMINATION WITHOUT ABNORMAL FINDING: Primary | ICD-10-CM

## 2019-12-27 PROCEDURE — 99386 PREV VISIT NEW AGE 40-64: CPT | Performed by: OBSTETRICS & GYNECOLOGY

## 2019-12-27 PROCEDURE — 87624 HPV HI-RISK TYP POOLED RSLT: CPT | Performed by: OBSTETRICS & GYNECOLOGY

## 2019-12-27 PROCEDURE — 77067 SCR MAMMO BI INCL CAD: CPT

## 2019-12-27 PROCEDURE — G0145 SCR C/V CYTO,THINLAYER,RESCR: HCPCS | Performed by: OBSTETRICS & GYNECOLOGY

## 2019-12-27 ASSESSMENT — ANXIETY QUESTIONNAIRES
7. FEELING AFRAID AS IF SOMETHING AWFUL MIGHT HAPPEN: NOT AT ALL
5. BEING SO RESTLESS THAT IT IS HARD TO SIT STILL: NOT AT ALL
1. FEELING NERVOUS, ANXIOUS, OR ON EDGE: NOT AT ALL
2. NOT BEING ABLE TO STOP OR CONTROL WORRYING: NOT AT ALL
GAD7 TOTAL SCORE: 0
3. WORRYING TOO MUCH ABOUT DIFFERENT THINGS: NOT AT ALL
6. BECOMING EASILY ANNOYED OR IRRITABLE: NOT AT ALL
IF YOU CHECKED OFF ANY PROBLEMS ON THIS QUESTIONNAIRE, HOW DIFFICULT HAVE THESE PROBLEMS MADE IT FOR YOU TO DO YOUR WORK, TAKE CARE OF THINGS AT HOME, OR GET ALONG WITH OTHER PEOPLE: NOT DIFFICULT AT ALL

## 2019-12-27 ASSESSMENT — PATIENT HEALTH QUESTIONNAIRE - PHQ9
5. POOR APPETITE OR OVEREATING: NOT AT ALL
SUM OF ALL RESPONSES TO PHQ QUESTIONS 1-9: 0

## 2019-12-27 ASSESSMENT — MIFFLIN-ST. JEOR: SCORE: 1393.02

## 2019-12-27 NOTE — PATIENT INSTRUCTIONS
Schedule your annual screening mammogram  Follow up with your primary care provider for your other medical problems.  Continue self breast exam.  Increase physical activity and exercise.  Lab and pap smear results will be called to the patient.  Co-testing done today and will repeat in 5yrs if negative.  Usual safety and preventative measures counseling done.  Discussed Osteoporosis screening as well as calcium and Vitamin D recommendations.  Will arrange bone scan this year with her primary care MD.

## 2019-12-27 NOTE — PROGRESS NOTES
Aylin is a 59 year old  female who presents for annual exam.     Besides routine health maintenance, she has no other health concerns today .    HPI:  Here today for yearly exam --has not been seen in our office since 2016.  Postmenopausal.  No vb/spotting.  Denies any signficant hot flushes or night sweats.  Was on HRT until 2016.  +SA --no issues/concerns.  No bowel/bladder issues.  No leaking or urgency issues.  Not getting up routinely overnight to void.    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 Springer; manages bloodwork, meds, bone scans/osteoporosis, etc' also sees pulmonologist for asthma/allergy issues  -hx osteoporosis; has been on fosamax in the past --has not had bone scan since   -up to date on colonoscopy      GYNECOLOGIC HISTORY:    No LMP recorded (lmp unknown). Patient is postmenopausal.        Her current contraception method is: menopause.  She  reports that she has never smoked. She has never used smokeless tobacco.    Patient is sexually active.  STD testing offered?  Declined  Last PHQ-9 score on record =   PHQ-9 SCORE 2016   PHQ-9 Total Score 0     Last GAD7 score on record =   WILLEM-7 SCORE 2016   Total Score 0     Alcohol Score = 2    HEALTH MAINTENANCE:  Cholesterol:   Cholesterol   Date Value Ref Range Status   2017 198 <200 mg/dL Final   2015 174 <200 mg/dL Final     Comment:     LDL Cholesterol is the primary guide to therapy.   The NCEP recommends further evaluation of: patients with cholesterol greater   than 200 mg/dL if additional risk factors are present, cholesterol greater   than   240 mg/dL, triglycerides greater than 150 mg/dL, or HDL less than 40 mg/dL.         Recent Labs   Lab Test 17  1213 06/11/15  0738   CHOL 198 174    65   LDL 69 88   TRIG 132 106   CHOLHDLRATIO  --  2.7     Last Mammo: Today at [facility], Result: Not applicable, Next Mammo: NA   Pap: 12 WNL HPV (-)neg  Colonoscopy:  13, Result: Normal, Next Colonoscopy: 4 years.  Dexa:      Health maintenance updated:  yes    HISTORY:  OB History    Para Term  AB Living   2 2 2 0 0 2   SAB TAB Ectopic Multiple Live Births   0 0 0 0 2      # Outcome Date GA Lbr Elvis/2nd Weight Sex Delivery Anes PTL Lv   2 Term 92 37w0d  2.92 kg (6 lb 7 oz) F  Local  AMARJIT      Name: Radha Schulz Term  42w0d  3.6 kg (7 lb 15 oz) F    AMARJIT      Name: Clari       Patient Active Problem List   Diagnosis     Bronchiectasis (H)     CARDIOVASCULAR SCREENING; LDL GOAL LESS THAN 160     Osteoporosis     Hormone replacement therapy     Uncomplicated asthma     Right-sided low back pain without sciatica     Past Surgical History:   Procedure Laterality Date     C ORAL SURGERY PROCEDURE       CARPAL TUNNEL RELEASE RT/LT        Social History     Tobacco Use     Smoking status: Never Smoker     Smokeless tobacco: Never Used   Substance Use Topics     Alcohol use: Yes     Alcohol/week: 0.0 standard drinks     Comment: rare socially      Problem (# of Occurrences) Relation (Name,Age of Onset)    Arthritis (2) Mother: b:1937, Father: b:1934    Family History Negative (2) Sister: 2 sisters b:,, Brother: 2 brothers b:,            Current Outpatient Medications   Medication Sig     azithromycin (ZITHROMAX) 250 MG tablet Take 2 tablets (500 mg) by mouth daily for 1 day, THEN 1 tablet (250 mg) daily for 4 days.     predniSONE (DELTASONE) 20 MG tablet Take 1 tablet (20 mg) by mouth 2 times daily for 5 days     TRELEGY ELLIPTA 100-62.5-25 MCG/INH oral inhaler      albuterol (2.5 MG/3ML) 0.083% neb solution Take 1 vial (2.5 mg) by nebulization every 4 hours as needed for shortness of breath  "/ dyspnea or wheezing (Patient not taking: Reported on 12/27/2019)     ipratropium - albuterol 0.5 mg/2.5 mg/3 mL (DUONEB) 0.5-2.5 (3) MG/3ML neb solution Take 1 vial (3 mLs) by nebulization every 6 hours as needed for shortness of breath / dyspnea or wheezing     No current facility-administered medications for this visit.      Allergies   Allergen Reactions     Mold        Past medical, surgical, social and family histories were reviewed and updated in EPIC.    ROS:   12 point review of systems negative other than symptoms noted below or in the HPI.  Constitutional: Weight Gain  Respiratory: Cough and Wheezing  No urinary frequency or dysuria, bladder or kidney problems    EXAM:  Ht 1.74 m (5' 8.5\")   Wt 76.7 kg (169 lb)   LMP  (LMP Unknown)   Breastfeeding No   BMI 25.32 kg/m     BMI: Body mass index is 25.32 kg/m .    PHYSICAL EXAM:  Constitutional:   Appearance: Well nourished, well developed, alert, in no acute distress  Neck:  Lymph Nodes:  No lymphadenopathy present    Thyroid:  Gland size normal, nontender, no nodules or masses present  on palpation  Chest:  Respiratory Effort:  Breathing unlabored  Cardiovascular:    Heart: Auscultation:  Regular rate, normal rhythm, no murmurs present  Breasts: Inspection of Breasts:  No lymphadenopathy present., Palpation of Breasts and Axillae:  No masses present on palpation, no breast tenderness., Axillary Lymph Nodes:  No lymphadenopathy present. and No nodularity, asymmetry or nipple discharge bilaterally.  Gastrointestinal:   Abdominal Examination:  Abdomen nontender to palpation, tone normal without rigidity or guarding, no masses present, umbilicus without lesions   Liver and Spleen:  No hepatomegaly present, liver nontender to palpation    Hernias:  No hernias present  Lymphatic: Lymph Nodes:  No other lymphadenopathy present  Skin:  General Inspection:  No rashes present, no lesions present, no areas of  discoloration  Neurologic:    Mental Status:  " Oriented X3.  Normal strength and tone, sensory exam                grossly normal, mentation intact and speech normal.    Psychiatric:   Mentation appears normal and affect normal/bright.         Pelvic Exam:  External Genitalia:     Normal appearance for age, no discharge present, no tenderness present, no inflammatory lesions present, color normal  Vagina:     Normal vaginal vault without central or paravaginal defects, no discharge present, no inflammatory lesions present, no masses present  Bladder:     Nontender to palpation  Urethra:   Urethral Body:  Urethra palpation normal, urethra structural support normal   Urethral Meatus:  No erythema or lesions present  Cervix:     Appearance healthy, no lesions present, nontender to palpation, no bleeding present  Uterus:     Uterus: firm, normal sized and nontender, midplane in position.   Adnexa:     No adnexal tenderness present, no adnexal masses present  Perineum:     Perineum within normal limits, no evidence of trauma, no rashes or skin lesions present  Anus:     Anus within normal limits, no hemorrhoids present  Inguinal Lymph Nodes:     No lymphadenopathy present  Pubic Hair:     Normal pubic hair distribution for age  Genitalia and Groin:     No rashes present, no lesions present, no areas of discoloration, no masses present      COUNSELING:   Reviewed preventive health counseling, as reflected in patient instructions  Special attention given to:        Regular exercise       Healthy diet/nutrition       Osteoporosis Prevention/Bone Health    BMI: Body mass index is 25.32 kg/m .  Weight management plan: Discussed healthy diet and exercise guidelines    ASSESSMENT:  59 year old female with satisfactory annual exam.    ICD-10-CM    1. Encounter for gynecological examination without abnormal finding Z01.419        PLAN:  Patient Instructions   Schedule your annual screening mammogram  Follow up with your primary care provider for your other medical  problems.  Continue self breast exam.  Increase physical activity and exercise.  Lab and pap smear results will be called to the patient.  Co-testing done today and will repeat in 5yrs if negative.  Usual safety and preventative measures counseling done.  Discussed Osteoporosis screening as well as calcium and Vitamin D recommendations.  Will arrange bone scan this year with her primary care MD.      Josi Celis MD

## 2019-12-28 ASSESSMENT — ANXIETY QUESTIONNAIRES: GAD7 TOTAL SCORE: 0

## 2019-12-31 LAB
COPATH REPORT: NORMAL
PAP: NORMAL

## 2020-01-02 LAB
FINAL DIAGNOSIS: NORMAL
HPV HR 12 DNA CVX QL NAA+PROBE: NEGATIVE
HPV16 DNA SPEC QL NAA+PROBE: NEGATIVE
HPV18 DNA SPEC QL NAA+PROBE: NEGATIVE
SPECIMEN DESCRIPTION: NORMAL
SPECIMEN SOURCE CVX/VAG CYTO: NORMAL

## 2020-01-05 ENCOUNTER — OFFICE VISIT (OUTPATIENT)
Dept: URGENT CARE | Facility: URGENT CARE | Age: 60
End: 2020-01-05
Payer: COMMERCIAL

## 2020-01-05 VITALS
HEART RATE: 120 BPM | OXYGEN SATURATION: 97 % | DIASTOLIC BLOOD PRESSURE: 70 MMHG | TEMPERATURE: 98.9 F | RESPIRATION RATE: 24 BRPM | SYSTOLIC BLOOD PRESSURE: 106 MMHG

## 2020-01-05 DIAGNOSIS — J11.1 INFLUENZA-LIKE ILLNESS: ICD-10-CM

## 2020-01-05 DIAGNOSIS — J47.1 BRONCHIECTASIS WITH ACUTE EXACERBATION (H): Primary | ICD-10-CM

## 2020-01-05 PROCEDURE — 99214 OFFICE O/P EST MOD 30 MIN: CPT | Performed by: PHYSICIAN ASSISTANT

## 2020-01-05 RX ORDER — OSELTAMIVIR PHOSPHATE 75 MG/1
75 CAPSULE ORAL 2 TIMES DAILY
Qty: 10 CAPSULE | Refills: 0 | Status: SHIPPED | OUTPATIENT
Start: 2020-01-05 | End: 2020-01-10

## 2020-01-05 RX ORDER — PREDNISONE 20 MG/1
TABLET ORAL
Qty: 18 TABLET | Refills: 0 | Status: SHIPPED | OUTPATIENT
Start: 2020-01-05 | End: 2021-05-18

## 2020-01-05 NOTE — PROGRESS NOTES
Patient presents with:  Breathing Problem: shortness of breath started last night. Treated 2 wks ago with zpak and pred      SUBJECTIVE:   Aylin Harding is a 59 year old female presenting with a chief complaint of   1) sudden on set of cough and wheezing since last night.  2) fever and chills    Onset of symptoms was as above  Course of illness is worsening.    Severity moderate  Current and Associated symptoms: as above  Treatment measures tried include Nyquil.  Predisposing factors include bronchiectasis.    Past Medical History:   Diagnosis Date     Asthma      Bronchiectasis 9/5/08     Hormone replacement therapy 2011     Osteoporosis 2001    followed by PCP (Dr. Springer)     Pneumonia, organism unspecified(486)      Uncomplicated asthma      Patient Active Problem List   Diagnosis     Bronchiectasis (H)     CARDIOVASCULAR SCREENING; LDL GOAL LESS THAN 160     Osteoporosis     Uncomplicated asthma     Right-sided low back pain without sciatica     Social History     Tobacco Use     Smoking status: Never Smoker     Smokeless tobacco: Never Used   Substance Use Topics     Alcohol use: Yes     Alcohol/week: 0.0 standard drinks     Comment: rare socially       ROS:  CONSTITUTIONAL:NEGATIVE for fever, chills, change in weight  INTEGUMENTARY/SKIN: NEGATIVE for worrisome rashes, moles or lesions  EYES: NEGATIVE for vision changes or irritation  ENT/MOUTH: NEGATIVE for ear, mouth and throat problems  RESP:as per HPI  CV: NEGATIVE for chest pain, palpitations or peripheral edema  GI: NEGATIVE for nausea, abdominal pain, heartburn, or change in bowel habits  : negative  MUSCULOSKELETAL: NEGATIVE for significant arthralgias or myalgia  NEURO: NEGATIVE for weakness, dizziness or paresthesias  Review of systems negative except as stated above.    OBJECTIVE  :/70 (Cuff Size: Adult Regular)   Pulse 120   Temp 98.9  F (37.2  C) (Oral)   Resp 24   LMP  (LMP Unknown)   SpO2 97%   GENERAL APPEARANCE: healthy,  alert and no distress  EYES: EOMI,  PERRL, conjunctiva clear  HENT: ear canals and TM's normal.  Nose and mouth without ulcers, erythema or lesions  NECK: supple, nontender, no lymphadenopathy  RESP: wheezing throughout  CV: regular rates and rhythm, normal S1 S2, no murmur noted  ABDOMEN:  soft, nontender, no HSM or masses and bowel sounds normal  NEURO: Normal strength and tone, sensory exam grossly normal,  normal speech and mentation  SKIN: no suspicious lesions or rashes    (J47.1) Bronchiectasis with acute exacerbation (H)  (primary encounter diagnosis)  Comment:   Plan: predniSONE (DELTASONE) 20 MG tablet,         amoxicillin-clavulanate (AUGMENTIN) 875-125 MG         tablet            (R69) Influenza-like illness  Comment:   Plan: oseltamivir (TAMIFLU) 75 MG capsule            Patient prefers to do her nebulizer treatment at home as opposed to receiving one in clinic today.

## 2020-01-05 NOTE — PATIENT INSTRUCTIONS
(J47.1) Bronchiectasis with acute exacerbation (H)  (primary encounter diagnosis)  Comment:   Plan: predniSONE (DELTASONE) 20 MG tablet,         amoxicillin-clavulanate (AUGMENTIN) 875-125 MG         tablet            (R69) Influenza-like illness  Comment:   Plan: oseltamivir (TAMIFLU) 75 MG capsule            Follow up with pulmonology

## 2020-03-24 ENCOUNTER — TRANSFERRED RECORDS (OUTPATIENT)
Dept: HEALTH INFORMATION MANAGEMENT | Facility: CLINIC | Age: 60
End: 2020-03-24

## 2020-05-05 ENCOUNTER — APPOINTMENT (OUTPATIENT)
Dept: LAB | Facility: CLINIC | Age: 60
End: 2020-05-05
Payer: COMMERCIAL

## 2020-05-05 ENCOUNTER — RESULTS ONLY (OUTPATIENT)
Dept: LAB | Age: 60
End: 2020-05-05

## 2020-05-05 ENCOUNTER — MYC MEDICAL ADVICE (OUTPATIENT)
Dept: OTHER | Facility: CLINIC | Age: 60
End: 2020-05-05

## 2020-05-05 DIAGNOSIS — J47.0 BRONCHIECTASIS WITH ACUTE LOWER RESPIRATORY INFECTION (H): Primary | ICD-10-CM

## 2020-05-05 NOTE — TELEPHONE ENCOUNTER
PLease see below.    Cata BREWSTER, RN    [facility]  [facility]  Office: [phone]  Fax: [phone]

## 2020-05-07 LAB
COVID-19 SPIKE RBD ABY TITER: NORMAL
COVID-19 SPIKE RBD ABY: NEGATIVE

## 2020-08-11 ENCOUNTER — MYC MEDICAL ADVICE (OUTPATIENT)
Dept: OTHER | Facility: CLINIC | Age: 60
End: 2020-08-11

## 2020-08-11 NOTE — TELEPHONE ENCOUNTER
Patient consented to video visit 8/12/20 at 2:30.  Josi BAIG BSN  [facility]  [phone]

## 2020-08-11 NOTE — TELEPHONE ENCOUNTER
Please call patient. I need to do a video visit with her about this as there is an informed consent discussion I have to have with her about this. I can see her for this 8/12/2020 at 2:30 pm as a double book.

## 2020-08-12 ENCOUNTER — VIRTUAL VISIT (OUTPATIENT)
Dept: OTHER | Facility: CLINIC | Age: 60
End: 2020-08-12
Attending: INTERNAL MEDICINE
Payer: COMMERCIAL

## 2020-08-12 DIAGNOSIS — J47.0 BRONCHIECTASIS WITH ACUTE LOWER RESPIRATORY INFECTION (H): Primary | ICD-10-CM

## 2020-08-12 PROCEDURE — 99214 OFFICE O/P EST MOD 30 MIN: CPT | Mod: ZP | Performed by: INTERNAL MEDICINE

## 2020-08-12 NOTE — PROGRESS NOTES
Aylin Harding is a 59 year old female who is presenting at the current time to discuss her diagnosi(es) of     Bronchiectasis with acute lower respiratory infection (H)     HPI: Aylin Harding is a 59 year old female with long standing bronchiectasis. Her  is a  who can not socially isolate at work, and can not wear N95 masks while working. He is on Plaquenil for COVID prophylaxis, accepting the risks of Plaquenil associated arrhythmias in order to prevent himself form harboring infection which he could pass on to his wife, this patient. She would like Plaquenil for prophylaxis herself.       Review Of Systems  Skin: negative  Eyes: negative  Ears/Nose/Throat: negative  Respiratory: No shortness of breath, dyspnea on exertion, cough, or hemoptysis  Cardiovascular: negative  Gastrointestinal: negative  Genitourinary: negative  Musculoskeletal: negative  Neurologic: negative  Psychiatric: negative  Hematologic/Lymphatic/Immunologic: negative  Endocrine: negative     PAST MEDICAL HISTORY:                  Past Medical History:   Diagnosis Date     Asthma      Bronchiectasis 9/5/08     Hormone replacement therapy 2011     Osteoporosis 2001    followed by PCP (Dr. Springer)     Pneumonia, organism unspecified(486)      Uncomplicated asthma        PAST SURGICAL HISTORY:                  Past Surgical History:   Procedure Laterality Date     C ORAL SURGERY PROCEDURE       CARPAL TUNNEL RELEASE RT/LT  2009       CURRENT MEDICATIONS:                  Current Outpatient Medications   Medication Sig Dispense Refill     albuterol (2.5 MG/3ML) 0.083% neb solution Take 1 vial (2.5 mg) by nebulization every 4 hours as needed for shortness of breath / dyspnea or wheezing 1 Box 0     ipratropium - albuterol 0.5 mg/2.5 mg/3 mL (DUONEB) 0.5-2.5 (3) MG/3ML neb solution Take 1 vial (3 mLs) by nebulization every 6 hours as needed for shortness of breath / dyspnea or wheezing 1 Box 1     predniSONE  (DELTASONE) 20 MG tablet 3 po QD for 3 days, then 2 po QD for 3 days, then 1 po QD for 3 days 18 tablet 0     TRELEGY ELLIPTA 100-62.5-25 MCG/INH oral inhaler          ALLERGIES:                  Allergies   Allergen Reactions     Mold        SOCIAL HISTORY:                  Social History     Socioeconomic History     Marital status:      Spouse name: kerri     Number of children: 2     Years of education: 14     Highest education level: Not on file   Occupational History     Occupation: manager     Employer: [employer]   Social Needs     Financial resource strain: Not on file     Food insecurity     Worry: Not on file     Inability: Not on file     Transportation needs     Medical: Not on file     Non-medical: Not on file   Tobacco Use     Smoking status: Never Smoker     Smokeless tobacco: Never Used   Substance and Sexual Activity     Alcohol use: Yes     Alcohol/week: 0.0 standard drinks     Comment: rare socially     Drug use: No     Sexual activity: Yes     Partners: Male     Birth control/protection: Post-menopausal   Lifestyle     Physical activity     Days per week: Not on file     Minutes per session: Not on file     Stress: Not on file   Relationships     Social connections     Talks on phone: Not on file     Gets together: Not on file     Attends Synagogue service: Not on file     Active member of club or organization: Not on file     Attends meetings of clubs or organizations: Not on file     Relationship status: Not on file     Intimate partner violence     Fear of current or ex partner: Not on file     Emotionally abused: Not on file     Physically abused: Not on file     Forced sexual activity: Not on file   Other Topics Concern     Parent/sibling w/ CABG, MI or angioplasty before 65F 55M? Not Asked   Social History Narrative     Not on file       FAMILY HISTORY:                   Family History   Problem Relation Age of Onset     Arthritis Mother         b:1937     Cancer Mother       Arthritis Father         b:1934     Family History Negative Sister         2 sisters b:1958,1962     Family History Negative Brother         2 brothers b:1960,1963     No Known Problems Maternal Grandmother      No Known Problems Maternal Grandfather      No Known Problems Paternal Grandmother      No Known Problems Other      No Known Problems Brother      No Known Problems Sister          Physical exam was not performed as this was a COVID mandated video visit.    A/P:    (J47.0) Bronchiectasis with acute lower respiratory infection (H)  (primary encounter diagnosis)  Comment:  She wants to do everything she can to prevent herself form getting COVID as she is afraid she would die should she contract it. This is a reasonable fear. She is aware the drug is unproven for efficacy in preventing COVID infection.. She knows the drug has a potential for harm with cardiac arrhthymias and a risk of ophthalmic complications. She is willing to accept these risks in order to achieve as yet unproven efficacy in prevneting infection.  Plan: Obtain EKG to reveal absence of QT prolongation. If no QT prolongation, I will prescribe Plaquenil 200 mg daily given patient willingness to accept risk of Plaquenil in context of expected very high mortality for herself should she get COVID. See optho for dilated eye exam if starting Plaquenil.    Video start: 2:50  Video end: 3:16    Greater than one half the twenty five minutes total spent on the pt's visit were spent providing education and counselling to the patient regarding the above matters.

## 2020-08-12 NOTE — PROGRESS NOTES
"Aylin Harding is a 59 year old female who is being evaluated via a billable video visit.      The patient has been notified of following:     \"This video visit will be conducted via a call between you and your physician/provider. We have found that certain health care needs can be provided without the need for an in-person physical exam.  This service lets us provide the care you need with a video conversation.  If a prescription is necessary we can send it directly to your pharmacy.  If lab work is needed we can place an order for that and you can then stop by our lab to have the test done at a later time.    Video visits are billed at different rates depending on your insurance coverage.  Please reach out to your insurance provider with any questions.    If during the course of the call the physician/provider feels a video visit is not appropriate, you will not be charged for this service.\"    Patient has given verbal consent for Video visit?  Yes, mobile phone number [phone]    How would you like to obtain your AVS? Mailed     Will anyone else be joining your video visit? No     Patient has not obtained vitals on 8/12/20   "

## 2020-08-14 ENCOUNTER — TELEPHONE (OUTPATIENT)
Dept: OTHER | Facility: CLINIC | Age: 60
End: 2020-08-14

## 2020-08-14 NOTE — TELEPHONE ENCOUNTER
Please see below.  Scheduled nurse visit 1120 Monday.    Cata BREWSTER, RN    [facility]  Office: [phone]  Fax: [phone]

## 2020-08-14 NOTE — TELEPHONE ENCOUNTER
Have patient scheduled for EKG on Monday and we will review it quick in clinic when it is completed.     Katelynn Alaniz PA-C

## 2020-08-14 NOTE — TELEPHONE ENCOUNTER
Please see Dr. Springer visit on 8/12/20.  Patient has EKG to be completed.  Could we have patient come in for appointment with you and compete EKG and then have you read it?  Please advise on how to move forward as Dr. pSringer is on vacation all next week.    Cata BREWSTER, RN    [facility]  [facility]  Office: [phone]  Fax: [phone]

## 2020-08-14 NOTE — TELEPHONE ENCOUNTER
August 14, 2020    Patient called [facility] stating that she was instructed to schedule an EKG.     Patient has order entered.     Routing to RN to advise scheduling.     Sakina Landis    [facility]  Office: [phone]  Fax [phone]

## 2020-08-14 NOTE — TELEPHONE ENCOUNTER
August 14, 2020    Patient is scheduled for a nurse visit appointment on 8/17/2020 at [facility].     Sakina Landis    [facility]  Office: [phone]  Fax [phone]

## 2020-08-17 ENCOUNTER — TELEPHONE (OUTPATIENT)
Dept: OTHER | Facility: CLINIC | Age: 60
End: 2020-08-17

## 2020-08-17 ENCOUNTER — OFFICE VISIT (OUTPATIENT)
Dept: OTHER | Facility: CLINIC | Age: 60
End: 2020-08-17
Attending: INTERNAL MEDICINE
Payer: COMMERCIAL

## 2020-08-17 VITALS
WEIGHT: 161 LBS | HEIGHT: 68 IN | DIASTOLIC BLOOD PRESSURE: 72 MMHG | OXYGEN SATURATION: 95 % | HEART RATE: 81 BPM | BODY MASS INDEX: 24.4 KG/M2 | SYSTOLIC BLOOD PRESSURE: 112 MMHG

## 2020-08-17 DIAGNOSIS — J20.9 ACUTE BRONCHITIS WITH COEXISTING CONDITION REQUIRING PROPHYLACTIC TREATMENT: Primary | ICD-10-CM

## 2020-08-17 DIAGNOSIS — T50.905A MEDICATION REACTION: Primary | ICD-10-CM

## 2020-08-17 PROCEDURE — G0463 HOSPITAL OUTPT CLINIC VISIT: HCPCS

## 2020-08-17 PROCEDURE — 93005 ELECTROCARDIOGRAM TRACING: CPT

## 2020-08-17 PROCEDURE — 93000 ELECTROCARDIOGRAM COMPLETE: CPT | Mod: ZP | Performed by: INTERNAL MEDICINE

## 2020-08-17 PROCEDURE — 99207 ZZC NO CHARGE NURSE ONLY: CPT

## 2020-08-17 RX ORDER — HYDROXYCHLOROQUINE SULFATE 200 MG/1
200 TABLET, FILM COATED ORAL DAILY
Qty: 90 TABLET | Refills: 3 | Status: SHIPPED | OUTPATIENT
Start: 2020-08-17 | End: 2021-06-01

## 2020-08-17 ASSESSMENT — MIFFLIN-ST. JEOR: SCORE: 1348.79

## 2020-08-17 NOTE — TELEPHONE ENCOUNTER
Dr. Pedersen reviewed EKG completed today in clinic- it is normal.  I discussed with Milagros that I will message Dr. Springer in regards to the EKG being completed and ordering Plaquenil.  I stated I will call her once I discuss the above with him and she verbalized understanding.    Patient is also requesting a pulmonologist.  recommendation other than [facility] from Dr. Springer.    Cata BREWSTER, RN    [facility]  Office: [phone]  Fax: [phone]

## 2020-08-17 NOTE — NURSING NOTE
Dr. Pedersen reviewed EKG completed today in clinic- it is normal.  I discussed with Milagros that I will message Dr. Springer in regards to the EKG being completed and ordering Plaquenil.  I stated I will call her once I discuss the above with him and she verbalized understanding.    Cata BREWSTER, RN    [facility]  Vascular [facility]  Office: [phone]  Fax: [phone]

## 2020-08-17 NOTE — TELEPHONE ENCOUNTER
[facility] please notify [facility]. Also, patient should be told to see her optho MD for eye exam on Plaquenil.

## 2020-08-17 NOTE — PROGRESS NOTES
"Aylin Harding is a 59 year old female who presents for:  Chief Complaint   Patient presents with     RECHECK     Nurse Visit for EKG per co. [facility]        Vitals:    Vitals:    08/17/20 1455 08/17/20 1456   BP: 110/71 112/72   BP Location: Right arm Left arm   Patient Position: Chair Chair   Cuff Size: Adult Regular Adult Regular   Pulse: 81    SpO2: 95%    Weight: 161 lb (73 kg)    Height: 5' 8\" (1.727 m)        BMI:  Estimated body mass index is 24.48 kg/m  as calculated from the following:    Height as of this encounter: 5' 8\" (1.727 m).    Weight as of this encounter: 161 lb (73 kg).    Pain Score:  Data Unavailable        Diana Naranjo CMA    "

## 2021-01-14 ENCOUNTER — HEALTH MAINTENANCE LETTER (OUTPATIENT)
Age: 61
End: 2021-01-14

## 2021-03-14 ENCOUNTER — HEALTH MAINTENANCE LETTER (OUTPATIENT)
Age: 61
End: 2021-03-14

## 2021-03-18 ENCOUNTER — OFFICE VISIT (OUTPATIENT)
Dept: URGENT CARE | Facility: URGENT CARE | Age: 61
End: 2021-03-18
Payer: COMMERCIAL

## 2021-03-18 VITALS
SYSTOLIC BLOOD PRESSURE: 106 MMHG | TEMPERATURE: 98.1 F | HEART RATE: 94 BPM | OXYGEN SATURATION: 96 % | DIASTOLIC BLOOD PRESSURE: 70 MMHG | RESPIRATION RATE: 16 BRPM

## 2021-03-18 DIAGNOSIS — T16.1XXA FOREIGN BODY OF RIGHT EAR, INITIAL ENCOUNTER: Primary | ICD-10-CM

## 2021-03-18 PROCEDURE — 69200 CLEAR OUTER EAR CANAL: CPT | Mod: RT | Performed by: NURSE PRACTITIONER

## 2021-03-19 ASSESSMENT — ENCOUNTER SYMPTOMS
COLOR CHANGE: 0
SINUS PAIN: 0
WOUND: 0
HEADACHES: 0
ACTIVITY CHANGE: 0
DIAPHORESIS: 0
ADENOPATHY: 0
CHILLS: 0
FATIGUE: 0
SLEEP DISTURBANCE: 0
SINUS PRESSURE: 0
FEVER: 0

## 2021-03-19 NOTE — PROGRESS NOTES
Chief Complaint   Patient presents with     Foreign Body     61 yo F presents with the following complaint right ear has hearing aid bud stuck     SUBJECTIVE:  Aylin Harding is a 60 year old female who presents to the clinic today with a hearing aid stuck in her ear today. It is the rubber earbud piece at the end of the hearing aid, fitted for her canal. Patient denies pain. She did attempt to remove it herself at home.    Past Medical History:   Diagnosis Date     Asthma      Bronchiectasis 9/5/08     Hormone replacement therapy 2011     Osteoporosis 2001    followed by PCP (Dr. Springer)     Pneumonia, organism unspecified(486)      Uncomplicated asthma      albuterol (2.5 MG/3ML) 0.083% neb solution, Take 1 vial (2.5 mg) by nebulization every 4 hours as needed for shortness of breath / dyspnea or wheezing  hydroxychloroquine (PLAQUENIL) 200 MG tablet, Take 1 tablet (200 mg) by mouth daily  predniSONE (DELTASONE) 20 MG tablet, 3 po QD for 3 days, then 2 po QD for 3 days, then 1 po QD for 3 days  TRELEGY ELLIPTA 100-62.5-25 MCG/INH oral inhaler,   ipratropium - albuterol 0.5 mg/2.5 mg/3 mL (DUONEB) 0.5-2.5 (3) MG/3ML neb solution, Take 1 vial (3 mLs) by nebulization every 6 hours as needed for shortness of breath / dyspnea or wheezing    No current facility-administered medications on file prior to visit.     Social History     Tobacco Use     Smoking status: Never Smoker     Smokeless tobacco: Never Used   Substance Use Topics     Alcohol use: Yes     Alcohol/week: 0.0 standard drinks     Comment: rare socially     Allergies   Allergen Reactions     Mold      Review of Systems   Constitutional: Negative for activity change, chills, diaphoresis, fatigue and fever.   HENT: Negative for congestion, ear discharge, ear pain, hearing loss, sinus pressure, sinus pain and tinnitus.    Skin: Negative for color change, pallor, rash and wound.   Allergic/Immunologic: Negative for environmental allergies.   Neurological:  Negative for headaches.   Hematological: Negative for adenopathy.   Psychiatric/Behavioral: Negative for sleep disturbance.     EXAM:   /70   Pulse 94   Temp 98.1  F (36.7  C)   Resp 16   LMP  (LMP Unknown)   SpO2 96%     Physical Exam  Vitals signs reviewed.   Constitutional:       General: She is not in acute distress.     Appearance: Normal appearance. She is not ill-appearing, toxic-appearing or diaphoretic.   HENT:      Head: Normocephalic and atraumatic.      Right Ear: Tympanic membrane normal.      Left Ear: Tympanic membrane and ear canal normal.      Ears:      Comments: Right externa auditory canal with black silicone hearing aid piece stuck deep within canal. Upon removal canal is clear without injury.  Cardiovascular:      Rate and Rhythm: Normal rate.   Pulmonary:      Effort: Pulmonary effort is normal.   Musculoskeletal: Normal range of motion.   Skin:     General: Skin is warm and dry.      Findings: No erythema or rash.   Neurological:      General: No focal deficit present.      Mental Status: She is alert and oriented to person, place, and time.   Psychiatric:         Mood and Affect: Mood normal.         Behavior: Behavior normal.       ASSESSMENT:    ICD-10-CM    1. Foreign body of right ear, initial encounter  T16.1XXA REMOVE FOREIGN BODY SIMPLE     PLAN:    PROCEDURE: An alligator forceps and lighted otoscope was used to grab edge of hearing aid piece. It was removed and patient tolerated procedure well.    Patient Instructions   Hearing aid piece removed  Recommend not using this hearing aid again until getting refit by ENT clinic  Do not pick or poke in canal    Follow up with primary care provider with any problems, questions or concerns or if symptoms worsen or fail to improve. Patient agreed to plan and verbalized understanding.    Radha Shaw, MAY-BC  [facility] URGENT [facility]

## 2021-03-19 NOTE — PATIENT INSTRUCTIONS
Hearing aid piece removed  Recommend not using this hearing aid again until getting refit by ENT clinic  Do not pick or poke in canal

## 2021-05-18 ENCOUNTER — OFFICE VISIT (OUTPATIENT)
Dept: URGENT CARE | Facility: URGENT CARE | Age: 61
End: 2021-05-18
Payer: COMMERCIAL

## 2021-05-18 VITALS
RESPIRATION RATE: 20 BRPM | OXYGEN SATURATION: 97 % | HEART RATE: 72 BPM | TEMPERATURE: 98.5 F | DIASTOLIC BLOOD PRESSURE: 78 MMHG | SYSTOLIC BLOOD PRESSURE: 120 MMHG | BODY MASS INDEX: 24.33 KG/M2 | WEIGHT: 160 LBS

## 2021-05-18 DIAGNOSIS — J30.2 SEASONAL ALLERGIC RHINITIS, UNSPECIFIED TRIGGER: ICD-10-CM

## 2021-05-18 DIAGNOSIS — J47.1 BRONCHIECTASIS WITH ACUTE EXACERBATION (H): Primary | ICD-10-CM

## 2021-05-18 DIAGNOSIS — R05.9 COUGH: ICD-10-CM

## 2021-05-18 LAB
SARS-COV-2 RNA RESP QL NAA+PROBE: NORMAL
SPECIMEN SOURCE: NORMAL

## 2021-05-18 PROCEDURE — U0005 INFEC AGEN DETEC AMPLI PROBE: HCPCS | Performed by: PHYSICIAN ASSISTANT

## 2021-05-18 PROCEDURE — 99214 OFFICE O/P EST MOD 30 MIN: CPT | Performed by: PHYSICIAN ASSISTANT

## 2021-05-18 PROCEDURE — U0003 INFECTIOUS AGENT DETECTION BY NUCLEIC ACID (DNA OR RNA); SEVERE ACUTE RESPIRATORY SYNDROME CORONAVIRUS 2 (SARS-COV-2) (CORONAVIRUS DISEASE [COVID-19]), AMPLIFIED PROBE TECHNIQUE, MAKING USE OF HIGH THROUGHPUT TECHNOLOGIES AS DESCRIBED BY CMS-2020-01-R: HCPCS | Performed by: PHYSICIAN ASSISTANT

## 2021-05-18 RX ORDER — PREDNISONE 20 MG/1
TABLET ORAL
Qty: 18 TABLET | Refills: 1 | Status: SHIPPED | OUTPATIENT
Start: 2021-05-18 | End: 2022-08-02

## 2021-05-18 RX ORDER — DOXYCYCLINE 100 MG/1
100 CAPSULE ORAL 2 TIMES DAILY
Qty: 20 CAPSULE | Refills: 0 | Status: SHIPPED | OUTPATIENT
Start: 2021-05-18 | End: 2021-05-28

## 2021-05-18 NOTE — PROGRESS NOTES
Patient presents with:  Throat Problem: no sore throat though  Cough: w/congestion since 5/14/2021    (J47.1) Bronchiectasis with acute exacerbation (H)  (primary encounter diagnosis)  Comment: with underlying allergies  Plan: predniSONE (DELTASONE) 20 MG tablet,         doxycycline hyclate (VIBRAMYCIN) 100 MG capsule          (J30.2) Seasonal allergic rhinitis, unspecified trigger  Comment:   Plan: Cetrizine daily    (R05) Cough  Comment:   Plan: Symptomatic COVID-19 Virus (Coronavirus) by PCR          Consider second opinion - new pulmonology clinic      SUBJECTIVE:   Aylin Harding is a 60 year old female who presents today with nasal congestion started 5/13/21.  Was in [facility] for the weekend, developed laryngitis.  Cough and wheezy feeling started more recently.      Took 20 mg prednisone twice a day for a couple of days       Past Medical History:   Diagnosis Date     Asthma      Bronchiectasis 9/5/08     Hormone replacement therapy 2011     Osteoporosis 2001    followed by PCP (Dr. Springer)     Pneumonia, organism unspecified(486)      Uncomplicated asthma          Current Outpatient Medications   Medication Sig Dispense Refill     Multiple Vitamins-Iron (DAILY-ALONDRA/IRON/BETA-CAROTENE) TABS TAKE 1 TABLET BY MOUTH DAILY. (Patient not taking: Reported on 10/19/2020) 30 tablet 7     Social History     Tobacco Use     Smoking status: Never Smoker     Smokeless tobacco: Never Used   Substance Use Topics     Alcohol use: Not on file     Family History   Problem Relation Age of Onset     Diabetes Mother      Diabetes Father          ROS:    10 point ROS of systems including Constitutional, Eyes, Respiratory, Cardiovascular, Gastroenterology, Genitourinary, Integumentary, Muscularskeletal, Psychiatric ,neurological were all negative except for pertinent positives noted in my HPI       OBJECTIVE:  /78   Pulse 72   Temp 98.5  F (36.9  C)   Resp 20   Wt 72.6 kg (160 lb)   LMP  (LMP Unknown)   SpO2  97%   BMI 24.33 kg/m    Physical Exam:  GENERAL APPEARANCE: healthy, alert and no distress  EYES: EOMI,  PERRL, conjunctiva clear  HENT: ear canals and TM's normal.  Nose and mouth without ulcers, erythema or lesions  HENT: nasal turbinates boggy with bluish hue and rhinorrhea clear  NECK: supple, nontender, no lymphadenopathy  RESP: wheezing and rhonchi  CV: regular rates and rhythm, normal S1 S2, no murmur noted  ABDOMEN:  soft, nontender, no HSM or masses and bowel sounds normal  NEURO: Normal strength and tone, sensory exam grossly normal,  normal speech and mentation  SKIN: no suspicious lesions or rashes

## 2021-05-18 NOTE — PATIENT INSTRUCTIONS
(J47.1) Bronchiectasis with acute exacerbation (H)  (primary encounter diagnosis)  Comment: with underlying allergies  Plan: predniSONE (DELTASONE) 20 MG tablet,         doxycycline hyclate (VIBRAMYCIN) 100 MG capsule          Cetrizine daily    (R05) Cough  Comment:   Plan: Symptomatic COVID-19 Virus (Coronavirus) by PCR          Consider second opinion - new pulmonology clinic

## 2021-05-19 LAB
LABORATORY COMMENT REPORT: NORMAL
SARS-COV-2 RNA RESP QL NAA+PROBE: NEGATIVE
SPECIMEN SOURCE: NORMAL

## 2021-05-31 DIAGNOSIS — J20.9 ACUTE BRONCHITIS WITH COEXISTING CONDITION REQUIRING PROPHYLACTIC TREATMENT: ICD-10-CM

## 2021-06-01 RX ORDER — HYDROXYCHLOROQUINE SULFATE 200 MG/1
TABLET, FILM COATED ORAL
Qty: 330 TABLET | Refills: 0 | Status: SHIPPED | OUTPATIENT
Start: 2021-06-01 | End: 2021-09-08

## 2021-06-01 NOTE — TELEPHONE ENCOUNTER
Unable to refill per FM protocol.  Med and pharm loaded.    Cata MCCORMICKN, RN    [facility]  Vascular [facility]  Office: [phone]  Fax: [phone]

## 2021-06-12 ENCOUNTER — ANCILLARY PROCEDURE (OUTPATIENT)
Dept: GENERAL RADIOLOGY | Facility: CLINIC | Age: 61
End: 2021-06-12
Attending: PHYSICIAN ASSISTANT
Payer: COMMERCIAL

## 2021-06-12 ENCOUNTER — OFFICE VISIT (OUTPATIENT)
Dept: URGENT CARE | Facility: URGENT CARE | Age: 61
End: 2021-06-12
Payer: COMMERCIAL

## 2021-06-12 VITALS
SYSTOLIC BLOOD PRESSURE: 124 MMHG | BODY MASS INDEX: 24.33 KG/M2 | HEART RATE: 101 BPM | OXYGEN SATURATION: 98 % | RESPIRATION RATE: 20 BRPM | WEIGHT: 160 LBS | DIASTOLIC BLOOD PRESSURE: 70 MMHG | TEMPERATURE: 98.6 F

## 2021-06-12 DIAGNOSIS — S99.921A FOOT INJURY, RIGHT, INITIAL ENCOUNTER: ICD-10-CM

## 2021-06-12 DIAGNOSIS — S99.911A ANKLE INJURY, RIGHT, INITIAL ENCOUNTER: ICD-10-CM

## 2021-06-12 DIAGNOSIS — S99.191A CLOSED FRACTURE OF BASE OF FIFTH METATARSAL BONE OF RIGHT FOOT AT METAPHYSEAL-DIAPHYSEAL JUNCTION, INITIAL ENCOUNTER: Primary | ICD-10-CM

## 2021-06-12 PROCEDURE — 73630 X-RAY EXAM OF FOOT: CPT | Mod: RT | Performed by: RADIOLOGY

## 2021-06-12 PROCEDURE — 99214 OFFICE O/P EST MOD 30 MIN: CPT | Performed by: PHYSICIAN ASSISTANT

## 2021-06-12 PROCEDURE — 73610 X-RAY EXAM OF ANKLE: CPT | Mod: RT | Performed by: RADIOLOGY

## 2021-06-12 RX ORDER — HYDROCODONE BITARTRATE AND ACETAMINOPHEN 5; 325 MG/1; MG/1
1 TABLET ORAL EVERY 6 HOURS PRN
Qty: 15 TABLET | Refills: 0 | Status: SHIPPED | OUTPATIENT
Start: 2021-06-12 | End: 2021-06-15

## 2021-06-13 NOTE — PROGRESS NOTES
(S98.887A) Closed fracture of base of fifth metatarsal bone of right foot at metaphyseal-diaphyseal junction, initial encounter  (primary encounter diagnosis)  Comment:   Plan: Ankle/Foot Bracing Supplies Order for DME -         ONLY FOR DME, Crutches Order for DME - ONLY FOR        DME, HYDROcodone-acetaminophen (NORCO) 5-325 MG        Tablet    See handout            (S96.532H) Foot injury, right, initial encounter  Comment:   Plan: XR Foot Right G/E 3 Views            (B90.911G) Ankle injury, right, initial encounter  Comment:   Plan: XR Ankle Right G/E 3 Views          No weight bearing until follow up with Orthopedics.  Follow up with Ortho within 3 days.            SUBJECTIVE:   Aylin Harding is a 60 year old female who presents today with right foot and ankle pain after injury just prior to arrival in clinic, missed a step.        Past Medical History:   Diagnosis Date     Asthma      Bronchiectasis 9/5/08     Hormone replacement therapy 2011     Osteoporosis 2001    followed by PCP (Dr. Springer)     Pneumonia, organism unspecified(486)      Uncomplicated asthma          Current Outpatient Medications   Medication Sig Dispense Refill     Multiple Vitamins-Iron (DAILY-ALONDRA/IRON/BETA-CAROTENE) TABS TAKE 1 TABLET BY MOUTH DAILY. (Patient not taking: Reported on 10/19/2020) 30 tablet 7     Social History     Tobacco Use     Smoking status: Never Smoker     Smokeless tobacco: Never Used   Substance Use Topics     Alcohol use: Not on file     Family History   Problem Relation Age of Onset     Diabetes Mother      Diabetes Father          ROS:    10 point ROS of systems including Constitutional, Eyes, Respiratory, Cardiovascular, Gastroenterology, Genitourinary, Integumentary, Muscularskeletal, Psychiatric ,neurological were all negative except for pertinent positives noted in my HPI       OBJECTIVE:  /70   Pulse 101   Temp 98.6  F (37  C)   Resp 20   Wt 72.6 kg (160 lb)   LMP  (LMP Unknown)    SpO2 98%   BMI 24.33 kg/m    Physical Exam:  GENERAL APPEARANCE: healthy, alert and no distress  Extremities: right foot: tenderness and pain at lateral foot.    Right ankle: swelling and tenderness at right malleolus.    SKIN: no suspicious lesions or rashes    X-Ray was done, my findings are: fracture at base of 5th metatarsal.

## 2021-06-13 NOTE — PATIENT INSTRUCTIONS
Patient Education     Broken Foot    You have a broken bone (fracture) in your foot. This will cause pain, swelling, and often bruising. It will usually take about 4 to 8 weeks to heal. A foot fracture may be treated with a special shoe, splint, cast, or boot.   Home care  Follow these guidelines when caring for yourself at home:    You may be given a splint, cast, shoe, or boot to keep the injured area from moving. Unless you were told otherwise, use crutches or a walker. Don t put weight on the injured foot until your healthcare provider says you can do so. (You can rent crutches and a walker at many drugstores and surgical or orthopedic supply stores.) Don t put weight on a splint, or it may break.    Keep your leg elevated to reduce pain and swelling. When sleeping, put a pillow under the injured leg. When sitting, support the injured leg so it's above your heart. This is very important during the first 2 days (48 hours).    Put an ice pack on the injured area. Do this for 20 minutes every 1 to 2 hours the first day for pain relief. You can make an ice pack by wrapping a plastic bag of ice cubes in a thin towel. As the ice melts, be careful that the splint, cast, boot, or shoe doesn t get wet. You can place the ice pack directly over the splint or cast. Unless told otherwise, you can open the boot or shoe to apply the ice pack. Continue using the ice pack 3 to 4 times a day for the next 2 days. Then use the ice pack as needed to ease pain and swelling.    Keep the splint, cast, boot, or shoe dry. When bathing, protect it with a large plastic bag, rubber-banded at the top end. If a fiberglass splint or cast or boot gets wet, you can dry it with a hair dryer. Unless told otherwise, you can take off the boot or shoe to bathe.    You may use acetaminophen or ibuprofen to control pain, unless another pain medicine was prescribed. If you have chronic liver or kidney disease, talk with your healthcare provider before  using these medicines. Also talk with your provider if you ve had a stomach ulcer or gastrointestinal bleeding.    Don t put creams or objects under the cast if you have itching.  Follow-up care  Follow up with your healthcare provider, or as advised. This is to make sure the bone is healing the way it should. If you were given a splint, it may be changed to a cast or boot at your follow-up visit.   X-rays may be taken. You will be told of any new findings that may affect your care.  When to seek medical advice  Call your healthcare provider right away if any of these occur:    The cast or splint cracks    The plaster cast or splint becomes wet or soft    The fiberglass cast or splint stays wet for more than 24 hours    Bad odor from the cast or wound fluid stains the cast    Tightness or pain under the cast or splint gets worse    Toes become swollen, cold, blue, numb, or tingly    You can t move your toes    Skin around cast or splint becomes red or swollen    Fever of 100.4 F (38 C) or higher, or as directed by your healthcare provider    Jose G Reddy last reviewed this educational content on 9/1/2019 2000-2021 [employer]. All rights reserved. This information is not intended as a substitute for professional medical care. Always follow your healthcare professional's instructions.               NO WEIGHT BEARING

## 2021-07-07 ENCOUNTER — TRANSFERRED RECORDS (OUTPATIENT)
Dept: HEALTH INFORMATION MANAGEMENT | Facility: CLINIC | Age: 61
End: 2021-07-07

## 2021-09-08 ENCOUNTER — MYC MEDICAL ADVICE (OUTPATIENT)
Dept: OTHER | Facility: CLINIC | Age: 61
End: 2021-09-08

## 2021-09-08 DIAGNOSIS — J20.9 ACUTE BRONCHITIS WITH COEXISTING CONDITION REQUIRING PROPHYLACTIC TREATMENT: ICD-10-CM

## 2021-09-08 DIAGNOSIS — Z20.822 COVID-19 RULED OUT BY CLINICAL CRITERIA: Primary | ICD-10-CM

## 2021-09-08 RX ORDER — HYDROXYCHLOROQUINE SULFATE 200 MG/1
200 TABLET, FILM COATED ORAL DAILY
Qty: 330 TABLET | Refills: 0 | Status: SHIPPED | OUTPATIENT
Start: 2021-09-08 | End: 2022-05-05

## 2021-09-08 NOTE — TELEPHONE ENCOUNTER
Please refill hydroxychloroquine. I cannot find out how to do it in EPIC. Check COVID spike ab to answer question about booster shot.

## 2021-09-10 NOTE — TELEPHONE ENCOUNTER
Please call patient and arrange for non-fasting lab draw for COVID-19 Scott [facility] Reflex    Josi Granados RN BSN  [facility]  [phone]

## 2021-09-22 ENCOUNTER — TRANSFERRED RECORDS (OUTPATIENT)
Dept: HEALTH INFORMATION MANAGEMENT | Facility: CLINIC | Age: 61
End: 2021-09-22

## 2021-10-08 ENCOUNTER — LAB (OUTPATIENT)
Dept: LAB | Facility: CLINIC | Age: 61
End: 2021-10-08
Attending: INTERNAL MEDICINE
Payer: COMMERCIAL

## 2021-10-08 DIAGNOSIS — Z20.822 COVID-19 RULED OUT BY CLINICAL CRITERIA: ICD-10-CM

## 2021-10-08 DIAGNOSIS — J20.9 ACUTE BRONCHITIS WITH COEXISTING CONDITION REQUIRING PROPHYLACTIC TREATMENT: ICD-10-CM

## 2021-10-08 PROCEDURE — 36415 COLL VENOUS BLD VENIPUNCTURE: CPT

## 2021-10-08 PROCEDURE — 99000 SPECIMEN HANDLING OFFICE-LAB: CPT

## 2021-10-08 PROCEDURE — 86769 SARS-COV-2 COVID-19 ANTIBODY: CPT | Mod: 90

## 2021-10-11 LAB
SARS-COV-2 AB SERPL IA-ACNC: >250 U/ML
SARS-COV-2 AB SERPL QL IA: POSITIVE

## 2021-10-15 ENCOUNTER — VIRTUAL VISIT (OUTPATIENT)
Dept: OTHER | Facility: CLINIC | Age: 61
End: 2021-10-15
Attending: INTERNAL MEDICINE
Payer: COMMERCIAL

## 2021-10-15 DIAGNOSIS — J47.9 BRONCHIECTASIS WITHOUT COMPLICATION (H): Primary | ICD-10-CM

## 2021-10-15 NOTE — PROGRESS NOTES
Milagros is a 61 year old who is being evaluated via a billable video visit.      How would you like to obtain your AVS? Mail a copy  If the video visit is dropped, the invitation should be resent by: Text to cell phone: [phone]  Will anyone else be joining your video visit? No      Video Start Time:

## 2021-10-15 NOTE — PROGRESS NOTES
Aylin Harding is a 61 year old female who is presenting at the current time to discuss her diagnosi(es) of bronchiectasis. She is concerned as to whether she should get a COVID booster vaccination. Her spike Abs are markedly elevated. She decides to not get a booster shot presently. I advised she check spike Abs in three months.     No charge for today's visit.

## 2022-01-17 ENCOUNTER — LAB (OUTPATIENT)
Dept: LAB | Facility: CLINIC | Age: 62
End: 2022-01-17
Payer: COMMERCIAL

## 2022-01-17 DIAGNOSIS — J47.9 BRONCHIECTASIS WITHOUT COMPLICATION (H): ICD-10-CM

## 2022-01-17 PROCEDURE — 86769 SARS-COV-2 COVID-19 ANTIBODY: CPT | Mod: 90

## 2022-01-17 PROCEDURE — 36415 COLL VENOUS BLD VENIPUNCTURE: CPT

## 2022-01-17 PROCEDURE — 99000 SPECIMEN HANDLING OFFICE-LAB: CPT

## 2022-01-18 LAB
SARS-COV-2 AB SERPL IA-ACNC: >250 U/ML
SARS-COV-2 AB SERPL-IMP: POSITIVE

## 2022-04-06 ENCOUNTER — TELEPHONE (OUTPATIENT)
Dept: OTHER | Facility: CLINIC | Age: 62
End: 2022-04-06

## 2022-04-06 DIAGNOSIS — J47.9 BRONCHIECTASIS WITHOUT COMPLICATION (H): ICD-10-CM

## 2022-04-06 DIAGNOSIS — R79.89 ELEVATED SERUM CREATININE: ICD-10-CM

## 2022-04-06 DIAGNOSIS — R79.89 ABNORMAL TSH: ICD-10-CM

## 2022-04-06 DIAGNOSIS — Z00.00 ANNUAL PHYSICAL EXAM: Primary | ICD-10-CM

## 2022-04-06 DIAGNOSIS — E87.6 HYPOKALEMIA: ICD-10-CM

## 2022-04-06 NOTE — TELEPHONE ENCOUNTER
Primary care patient.  Patient has had covid antibody tests drawn in past two years, but nothing else within the past year.    CBC w/platelets  CMP  Hemoglobin A1c  Lipid Profile  TSH   T3 Free  T4 Free     Loaded labs for review/signature.    Josi Granados RN BSN  [facility]  [phone]

## 2022-04-07 NOTE — TELEPHONE ENCOUNTER
Patient is scheduled for her fasting labs on 04/22/21 and follow up appointment with Dr Springer on 04/29/22. Patient is wondering if Dr Springer wants another Antibody test done? Patient stated that he had been doing them every 3 months.

## 2022-04-07 NOTE — TELEPHONE ENCOUNTER
Please return patient's call to schedule:      Fasting labs    In clinic visit one week later.    The labs that were drawn in January were related to COVID-19 and were not complete labs for annual physical exam.    Josi Granados RN BSN  [facility]  [phone]

## 2022-04-10 ENCOUNTER — HEALTH MAINTENANCE LETTER (OUTPATIENT)
Age: 62
End: 2022-04-10

## 2022-04-29 ENCOUNTER — LAB (OUTPATIENT)
Dept: LAB | Facility: CLINIC | Age: 62
End: 2022-04-29
Attending: INTERNAL MEDICINE
Payer: COMMERCIAL

## 2022-04-29 DIAGNOSIS — E87.6 HYPOKALEMIA: ICD-10-CM

## 2022-04-29 DIAGNOSIS — R79.89 ELEVATED SERUM CREATININE: ICD-10-CM

## 2022-04-29 DIAGNOSIS — R79.89 ABNORMAL TSH: ICD-10-CM

## 2022-04-29 DIAGNOSIS — J47.9 BRONCHIECTASIS WITHOUT COMPLICATION (H): ICD-10-CM

## 2022-04-29 DIAGNOSIS — Z00.00 ANNUAL PHYSICAL EXAM: ICD-10-CM

## 2022-04-29 LAB
BASOPHILS # BLD AUTO: 0 10E3/UL (ref 0–0.2)
BASOPHILS NFR BLD AUTO: 0 %
EOSINOPHIL # BLD AUTO: 0.4 10E3/UL (ref 0–0.7)
EOSINOPHIL NFR BLD AUTO: 2 %
ERYTHROCYTE [DISTWIDTH] IN BLOOD BY AUTOMATED COUNT: 13.3 % (ref 10–15)
HBA1C MFR BLD: 5.2 % (ref 0–5.6)
HCT VFR BLD AUTO: 44.1 % (ref 35–47)
HGB BLD-MCNC: 14.7 G/DL (ref 11.7–15.7)
LYMPHOCYTES # BLD AUTO: 1.6 10E3/UL (ref 0.8–5.3)
LYMPHOCYTES NFR BLD AUTO: 10 %
MCH RBC QN AUTO: 29.5 PG (ref 26.5–33)
MCHC RBC AUTO-ENTMCNC: 33.3 G/DL (ref 31.5–36.5)
MCV RBC AUTO: 88 FL (ref 78–100)
MONOCYTES # BLD AUTO: 0.8 10E3/UL (ref 0–1.3)
MONOCYTES NFR BLD AUTO: 5 %
NEUTROPHILS # BLD AUTO: 13.3 10E3/UL (ref 1.6–8.3)
NEUTROPHILS NFR BLD AUTO: 83 %
PLATELET # BLD AUTO: 242 10E3/UL (ref 150–450)
RBC # BLD AUTO: 4.99 10E6/UL (ref 3.8–5.2)
T3FREE SERPL-MCNC: 2.8 PG/ML (ref 2.3–4.2)
WBC # BLD AUTO: 16.2 10E3/UL (ref 4–11)

## 2022-04-29 PROCEDURE — 80061 LIPID PANEL: CPT

## 2022-04-29 PROCEDURE — 84439 ASSAY OF FREE THYROXINE: CPT

## 2022-04-29 PROCEDURE — 83036 HEMOGLOBIN GLYCOSYLATED A1C: CPT

## 2022-04-29 PROCEDURE — 86769 SARS-COV-2 COVID-19 ANTIBODY: CPT | Mod: 90

## 2022-04-29 PROCEDURE — 80050 GENERAL HEALTH PANEL: CPT

## 2022-04-29 PROCEDURE — 84481 FREE ASSAY (FT-3): CPT

## 2022-04-29 PROCEDURE — 99000 SPECIMEN HANDLING OFFICE-LAB: CPT

## 2022-04-29 PROCEDURE — 36415 COLL VENOUS BLD VENIPUNCTURE: CPT

## 2022-05-01 LAB
ALBUMIN SERPL-MCNC: 4.1 G/DL (ref 3.4–5)
ALP SERPL-CCNC: 104 U/L (ref 40–150)
ALT SERPL W P-5'-P-CCNC: 26 U/L (ref 0–50)
ANION GAP SERPL CALCULATED.3IONS-SCNC: 7 MMOL/L (ref 3–14)
AST SERPL W P-5'-P-CCNC: 21 U/L (ref 0–45)
BILIRUB SERPL-MCNC: 1.4 MG/DL (ref 0.2–1.3)
BUN SERPL-MCNC: 17 MG/DL (ref 7–30)
CALCIUM SERPL-MCNC: 9.5 MG/DL (ref 8.5–10.1)
CHLORIDE BLD-SCNC: 105 MMOL/L (ref 94–109)
CHOLEST SERPL-MCNC: 170 MG/DL
CO2 SERPL-SCNC: 29 MMOL/L (ref 20–32)
CREAT SERPL-MCNC: 0.83 MG/DL (ref 0.52–1.04)
FASTING STATUS PATIENT QL REPORTED: YES
GFR SERPL CREATININE-BSD FRML MDRD: 79 ML/MIN/1.73M2
GLUCOSE BLD-MCNC: 72 MG/DL (ref 70–99)
HDLC SERPL-MCNC: 73 MG/DL
LDLC SERPL CALC-MCNC: 80 MG/DL
NONHDLC SERPL-MCNC: 97 MG/DL
POTASSIUM BLD-SCNC: 4.3 MMOL/L (ref 3.4–5.3)
PROT SERPL-MCNC: 7.8 G/DL (ref 6.8–8.8)
SODIUM SERPL-SCNC: 141 MMOL/L (ref 133–144)
T4 FREE SERPL-MCNC: 1.16 NG/DL (ref 0.76–1.46)
TRIGL SERPL-MCNC: 86 MG/DL
TSH SERPL DL<=0.005 MIU/L-ACNC: 1.98 MU/L (ref 0.4–4)

## 2022-05-02 LAB
SARS-COV-2 AB SERPL IA-ACNC: >250 U/ML
SARS-COV-2 AB SERPL-IMP: POSITIVE

## 2022-05-05 ENCOUNTER — OFFICE VISIT (OUTPATIENT)
Dept: OTHER | Facility: CLINIC | Age: 62
End: 2022-05-05
Attending: INTERNAL MEDICINE
Payer: COMMERCIAL

## 2022-05-05 VITALS — OXYGEN SATURATION: 97 % | SYSTOLIC BLOOD PRESSURE: 95 MMHG | HEART RATE: 82 BPM | DIASTOLIC BLOOD PRESSURE: 57 MMHG

## 2022-05-05 DIAGNOSIS — Z00.00 ANNUAL PHYSICAL EXAM: Primary | ICD-10-CM

## 2022-05-05 DIAGNOSIS — J47.9 BRONCHIECTASIS WITHOUT COMPLICATION (H): ICD-10-CM

## 2022-05-05 DIAGNOSIS — R79.89 ABNORMAL TSH: ICD-10-CM

## 2022-05-05 PROCEDURE — 99396 PREV VISIT EST AGE 40-64: CPT | Performed by: INTERNAL MEDICINE

## 2022-05-05 PROCEDURE — G0463 HOSPITAL OUTPT CLINIC VISIT: HCPCS

## 2022-05-05 PROCEDURE — 99213 OFFICE O/P EST LOW 20 MIN: CPT | Mod: 25 | Performed by: INTERNAL MEDICINE

## 2022-05-05 RX ORDER — HYDROXYCHLOROQUINE SULFATE 200 MG/1
200 TABLET, FILM COATED ORAL DAILY
Qty: 360 TABLET | Refills: 0 | Status: SHIPPED | OUTPATIENT
Start: 2022-05-05 | End: 2023-05-22

## 2022-05-05 RX ORDER — DOXYCYCLINE HYCLATE 100 MG
TABLET ORAL
COMMUNITY
Start: 2022-04-29 | End: 2023-11-22

## 2022-05-05 RX ORDER — IPRATROPIUM BROMIDE AND ALBUTEROL SULFATE 2.5; .5 MG/3ML; MG/3ML
1 SOLUTION RESPIRATORY (INHALATION) EVERY 6 HOURS PRN
Status: CANCELLED | OUTPATIENT
Start: 2022-05-05

## 2022-05-05 NOTE — PROGRESS NOTES
SUBJECTIVE:    CC: Aylin Harding is a 61 year old female who presents for:    Bronchiectasis without complication (H)        PAST MEDICAL HISTORY:                  Past Medical History:   Diagnosis Date     Asthma      Bronchiectasis 9/5/08     Hormone replacement therapy 2011     Osteoporosis 2001    followed by PCP (Dr. Springer)     Pneumonia, organism unspecified(486)      Uncomplicated asthma        PAST SURGICAL HISTORY:                  Past Surgical History:   Procedure Laterality Date     CARPAL TUNNEL RELEASE RT/LT  2009     ZZC ORAL SURGERY PROCEDURE         CURRENT MEDICATIONS:                  Current Outpatient Medications   Medication Sig Dispense Refill     amoxicillin-clavulanate (AUGMENTIN) 875-125 MG tablet        doxycycline hyclate (VIBRA-TABS) 100 MG tablet        hydroxychloroquine (PLAQUENIL) 200 MG tablet Take 1 tablet (200 mg) by mouth daily 330 tablet 0     ipratropium - albuterol 0.5 mg/2.5 mg/3 mL (DUONEB) 0.5-2.5 (3) MG/3ML neb solution Take 1 vial (3 mLs) by nebulization every 6 hours as needed for shortness of breath / dyspnea or wheezing 1 Box 1     predniSONE (DELTASONE) 20 MG tablet 3 po QD for 3 days, then 2 po QD for 3 days, then 1 po QD for 3 days (Patient taking differently: 10 mg 3 po QD for 3 days, then 2 po QD for 3 days, then 1 po QD for 3 days. As needed) 18 tablet 1     TRELEGY ELLIPTA 100-62.5-25 MCG/INH oral inhaler          ALLERGIES:                  Allergies   Allergen Reactions     Mold        SOCIAL HISTORY:                  Social History     Socioeconomic History     Marital status:      Spouse name: kerri     Number of children: 2     Years of education: 14     Highest education level: Not on file   Occupational History     Occupation: manager     Employer: [employer]   Tobacco Use     Smoking status: Never Smoker     Smokeless tobacco: Never Used   Substance and Sexual Activity     Alcohol use: Yes     Alcohol/week: 0.0 standard drinks      Comment: rare socially     Drug use: No     Sexual activity: Yes     Partners: Male     Birth control/protection: Post-menopausal   Other Topics Concern     Parent/sibling w/ CABG, MI or angioplasty before 65F 55M? Not Asked   Social History Narrative     Not on file     Social Determinants of Health     Financial Resource Strain: Not on file   Food Insecurity: Not on file   Transportation Needs: Not on file   Physical Activity: Not on file   Stress: Not on file   Social Connections: Not on file   Intimate Partner Violence: Not on file   Housing Stability: Not on file       FAMILY HISTORY:                   Family History   Problem Relation Age of Onset     Arthritis Mother         b:1937     Cancer Mother      Arthritis Father         b:1934     Family History Negative Sister         2 sisters b:1958,1962     Family History Negative Brother         2 brothers b:1960,1963     No Known Problems Maternal Grandmother      No Known Problems Maternal Grandfather      No Known Problems Paternal Grandmother      No Known Problems Other      No Known Problems Brother      No Known Problems Sister              CONSTITUTIONAL: no malaise, fatigue, or other general symptoms  EYES: no subjective changes in visual acuity, no photophobia  ENT/MOUTH: no complaints of rhinorrhea, nasal congestion, sore throat, hearing changes  RESP: no SOB  CV: no c/o exertional chest pressure or RATLIFF  GI: No abdominal pain, constipation, change in bowel movements, nausea, pyrosis, BRBPR  : no polyuria or polydipsia, no dysuria, no gross hematuria  MUSCULOSKELETAL: no arthalgias or myalgias  INTEGUMENTARY/SKIN: no pruritis, rashes, or moles with recent change in size, shape, or pigmentation  BREAST: no lumps, masses, or discharges  NEURO: no gross sensory or motor symptoms, no dizziness, no confusion  ENDOCRINE: no polyuria or polydipsia, no heat or cold intolerance  HEME/ALLERGY/IMMUNE: no fevers, chills, night sweats, or unwanted weight  loss  PSYCHIATRIC: no depression, anxiety, or internal stimuli    EXAM:    BP 95/57 (BP Location: Right arm, Patient Position: Chair, Cuff Size: Adult Regular)   Pulse 82   LMP  (LMP Unknown)   SpO2 97%   BMI: There is no height or weight on file to calculate BMI.    GENERAL APPEARANCE:healthy, alert and no distress    ORGAN EXAMS:               EYES: clear conjunctiva, no cataracts, no obvious fundoscopic abnormalities               HENT: oropharynx, nares, and TMs are WNL               NECK: no JVD, thyromegaly or lymphadenopathy, no cervical bruits               RESP: clear to auscultation without rales, wheezes, or rhonchi               CV: RRR, no murmurs, gallops, or rubs               LYMPH: no cervical , axillary, or inguinal lymphadenopathy appreciated               GI: NABS, ND/NT, no masses or organomegally appreciated               MS: no obvoius clinicallly relevant arthropathy, no evidence vasculitis               SKIN: no nevi clinically suspicious for malignancy are noted               NEURO: CN II-XII intact, no localizing sensory or motor abnoramlities noted, DTRs symmetrical bilaterally               PSYCH: Mental status exam reveals the pt to have normal mood and affect. There is no disorder of thought form or content. There is no response to internal stimuli. There is no suicidal or homicidal ideation.      Component      Latest Ref Rng & Units 5/5/2020   WBC      4.0 - 11.0 10e3/uL    RBC Count      3.80 - 5.20 10e6/uL    Hemoglobin      11.7 - 15.7 g/dL    Hematocrit      35.0 - 47.0 %    MCV      78 - 100 fL    MCH      26.5 - 33.0 pg    MCHC      31.5 - 36.5 g/dL    RDW      10.0 - 15.0 %    Platelet Count      150 - 450 10e3/uL    % Neutrophils      %    % Lymphocytes      %    % Monocytes      %    % Eosinophils      %    % Basophils      %    Absolute Neutrophils      1.6 - 8.3 10e3/uL    Absolute Lymphocytes      0.8 - 5.3 10e3/uL    Absolute Monocytes      0.0 - 1.3 10e3/uL     Absolute Eosinophils      0.0 - 0.7 10e3/uL    Absolute Basophils      0.0 - 0.2 10e3/uL    Sodium      133 - 144 mmol/L    Potassium      3.4 - 5.3 mmol/L    Chloride      94 - 109 mmol/L    Carbon Dioxide      20 - 32 mmol/L    Anion Gap      3 - 14 mmol/L    Urea Nitrogen      7 - 30 mg/dL    Creatinine      0.52 - 1.04 mg/dL    Calcium      8.5 - 10.1 mg/dL    Glucose      70 - 99 mg/dL    Alkaline Phosphatase      40 - 150 U/L    AST      0 - 45 U/L    ALT      0 - 50 U/L    Protein Total      6.8 - 8.8 g/dL    Albumin      3.4 - 5.0 g/dL    Bilirubin Total      0.2 - 1.3 mg/dL    GFR Estimate      >60 mL/min/1.73m2    Cholesterol      <200 mg/dL    Triglycerides      <150 mg/dL    HDL Cholesterol      >=50 mg/dL    LDL Cholesterol Calculated      <=100 mg/dL    Non HDL Cholesterol      <130 mg/dL    Patient Fasting > 8hrs?          SARS-CoV-2 Scott Ab, Interp, S          SARS-CoV-2 Scott Ab, Quant, S      U/mL    Patient's Race          Patient's Ethnicity          COVID-19 Scott RBD [place]       Negative   COVID-19 Scott RBD [place] Titer       Not Applicable   COVID-19 Virus PCR to [facility] - Source          COVID-19 Virus PCR to [facility] - Result          Hemoglobin A1C      0.0 - 5.6 %    Free T3      2.3 - 4.2 pg/mL    T4 Free      0.76 - 1.46 ng/dL    TSH      0.40 - 4.00 mU/L      Component      Latest Ref Rng & Units 5/18/2021   WBC      4.0 - 11.0 10e3/uL    RBC Count      3.80 - 5.20 10e6/uL    Hemoglobin      11.7 - 15.7 g/dL    Hematocrit      35.0 - 47.0 %    MCV      78 - 100 fL    MCH      26.5 - 33.0 pg    MCHC      31.5 - 36.5 g/dL    RDW      10.0 - 15.0 %    Platelet Count      150 - 450 10e3/uL    % Neutrophils      %    % Lymphocytes      %    % Monocytes      %    % Eosinophils      %    % Basophils      %    Absolute Neutrophils      1.6 - 8.3 10e3/uL    Absolute Lymphocytes      0.8 - 5.3 10e3/uL    Absolute Monocytes      0.0 - 1.3 10e3/uL    Absolute Eosinophils      0.0 - 0.7 10e3/uL     Absolute Basophils      0.0 - 0.2 10e3/uL    Sodium      133 - 144 mmol/L    Potassium      3.4 - 5.3 mmol/L    Chloride      94 - 109 mmol/L    Carbon Dioxide      20 - 32 mmol/L    Anion Gap      3 - 14 mmol/L    Urea Nitrogen      7 - 30 mg/dL    Creatinine      0.52 - 1.04 mg/dL    Calcium      8.5 - 10.1 mg/dL    Glucose      70 - 99 mg/dL    Alkaline Phosphatase      40 - 150 U/L    AST      0 - 45 U/L    ALT      0 - 50 U/L    Protein Total      6.8 - 8.8 g/dL    Albumin      3.4 - 5.0 g/dL    Bilirubin Total      0.2 - 1.3 mg/dL    GFR Estimate      >60 mL/min/1.73m2    Cholesterol      <200 mg/dL    Triglycerides      <150 mg/dL    HDL Cholesterol      >=50 mg/dL    LDL Cholesterol Calculated      <=100 mg/dL    Non HDL Cholesterol      <130 mg/dL    Patient Fasting > 8hrs?          SARS-CoV-2 Scott Ab, Interp, S          SARS-CoV-2 Scott Ab, Quant, S      U/mL    Patient's Race          Patient's Ethnicity          COVID-19 Scott RBD Katelin          COVID-19 Scott RBD [place] Titer          COVID-19 Virus PCR to [facility] - Source       Nasopharyngeal   COVID-19 Virus PCR to [facility] - Result       Test received-See reflex to IDDL test SARS CoV2 (COVID-19) Virus RT-PCR   Hemoglobin A1C      0.0 - 5.6 %    Free T3      2.3 - 4.2 pg/mL    T4 Free      0.76 - 1.46 ng/dL    TSH      0.40 - 4.00 mU/L      Component      Latest Ref Rng & Units 10/8/2021 1/17/2022 4/29/2022   WBC      4.0 - 11.0 10e3/uL   16.2 (H)   RBC Count      3.80 - 5.20 10e6/uL   4.99   Hemoglobin      11.7 - 15.7 g/dL   14.7   Hematocrit      35.0 - 47.0 %   44.1   MCV      78 - 100 fL   88   MCH      26.5 - 33.0 pg   29.5   MCHC      31.5 - 36.5 g/dL   33.3   RDW      10.0 - 15.0 %   13.3   Platelet Count      150 - 450 10e3/uL   242   % Neutrophils      %   83   % Lymphocytes      %   10   % Monocytes      %   5   % Eosinophils      %   2   % Basophils      %   0   Absolute Neutrophils      1.6 - 8.3 10e3/uL   13.3 (H)   Absolute Lymphocytes       0.8 - 5.3 10e3/uL   1.6   Absolute Monocytes      0.0 - 1.3 10e3/uL   0.8   Absolute Eosinophils      0.0 - 0.7 10e3/uL   0.4   Absolute Basophils      0.0 - 0.2 10e3/uL   0.0   Sodium      133 - 144 mmol/L   141   Potassium      3.4 - 5.3 mmol/L   4.3   Chloride      94 - 109 mmol/L   105   Carbon Dioxide      20 - 32 mmol/L   29   Anion Gap      3 - 14 mmol/L   7   Urea Nitrogen      7 - 30 mg/dL   17   Creatinine      0.52 - 1.04 mg/dL   0.83   Calcium      8.5 - 10.1 mg/dL   9.5   Glucose      70 - 99 mg/dL   72   Alkaline Phosphatase      40 - 150 U/L   104   AST      0 - 45 U/L   21   ALT      0 - 50 U/L   26   Protein Total      6.8 - 8.8 g/dL   7.8   Albumin      3.4 - 5.0 g/dL   4.1   Bilirubin Total      0.2 - 1.3 mg/dL   1.4 (H)   GFR Estimate      >60 mL/min/1.73m2   79   Cholesterol      <200 mg/dL   170   Triglycerides      <150 mg/dL   86   HDL Cholesterol      >=50 mg/dL   73   LDL Cholesterol Calculated      <=100 mg/dL   80   Non HDL Cholesterol      <130 mg/dL   97   Patient Fasting > 8hrs?         Yes   SARS-CoV-2 Scott Ab, Interp, S       Positive (A) Positive Positive   SARS-CoV-2 Scott Ab, Quant, S      U/mL >250 (H) >250 >250   Patient's Race       Unknown Unknown Unknown   Patient's Ethnicity       Unknown Unknown Unknown   COVID-19 Scott RBD Katelin            COVID-19 Scott RBD [place] Titer            COVID-19 Virus PCR to [facility] - Source            COVID-19 Virus PCR to [facility] - Result            Hemoglobin A1C      0.0 - 5.6 %   5.2   Free T3      2.3 - 4.2 pg/mL   2.8   T4 Free      0.76 - 1.46 ng/dL   1.16   TSH      0.40 - 4.00 mU/L   1.98      ASSESSMENT/PLAN:      (Z00.00) Annual physical exam  (primary encounter diagnosis)  Comment: doing well  Plan: CBC with platelets differential, Comprehensive         metabolic panel, Lipid Profile, TSH, T4 free,         T3 Free            (J47.9) Bronchiectasis without complication (H)  Comment: Needs to be on Plaquenil for COVID prophylaxis. If  she gets COVID she would be expected to do poorly. She is instructed to get annual dilated eye exams.  Plan: hydroxychloroquine (PLAQUENIL) 200 MG tablet,         OFFICE/OUTPT VISIT,EST,LEVL III            (R79.89) Abnormal TSH  Comment: normal now, monitor  Plan: TSH, T4 free, T3 Free, OFFICE/OUTPT         VISIT,EST,LEVL III           20 minutes not spent on preventive care during this visit was spent providing medical care  regarding item(s) # 2 onward as delineated above.

## 2022-05-05 NOTE — PROGRESS NOTES
Patient is here to discuss follow up.    BP 95/57 (BP Location: Right arm, Patient Position: Chair, Cuff Size: Adult Regular)   Pulse 82   LMP  (LMP Unknown)   SpO2 97%     Questions patient would like addressed today are: N/A.    Refills are needed: Yes:  Hyrdoxychloroquine Sulfate.    Has homecare services and agency name:  [facility]

## 2022-05-15 ENCOUNTER — MYC MEDICAL ADVICE (OUTPATIENT)
Dept: OTHER | Facility: CLINIC | Age: 62
End: 2022-05-15

## 2022-05-15 DIAGNOSIS — J45.909 UNCOMPLICATED ASTHMA: ICD-10-CM

## 2022-05-15 DIAGNOSIS — J47.9 BRONCHIECTASIS WITHOUT COMPLICATION (H): Primary | ICD-10-CM

## 2022-05-17 RX ORDER — ALBUTEROL SULFATE 90 UG/1
2 AEROSOL, METERED RESPIRATORY (INHALATION) EVERY 6 HOURS
Qty: 18 G | Refills: 3 | Status: SHIPPED | OUTPATIENT
Start: 2022-05-17 | End: 2023-11-22

## 2022-08-02 ENCOUNTER — ANCILLARY PROCEDURE (OUTPATIENT)
Dept: GENERAL RADIOLOGY | Facility: CLINIC | Age: 62
End: 2022-08-02
Attending: PHYSICIAN ASSISTANT
Payer: COMMERCIAL

## 2022-08-02 ENCOUNTER — OFFICE VISIT (OUTPATIENT)
Dept: URGENT CARE | Facility: URGENT CARE | Age: 62
End: 2022-08-02

## 2022-08-02 VITALS — SYSTOLIC BLOOD PRESSURE: 119 MMHG | HEART RATE: 79 BPM | DIASTOLIC BLOOD PRESSURE: 71 MMHG | OXYGEN SATURATION: 97 %

## 2022-08-02 DIAGNOSIS — R07.81 RIB PAIN ON LEFT SIDE: ICD-10-CM

## 2022-08-02 DIAGNOSIS — S49.92XA INJURY OF LEFT UPPER ARM, INITIAL ENCOUNTER: Primary | ICD-10-CM

## 2022-08-02 DIAGNOSIS — R05.9 COUGH: ICD-10-CM

## 2022-08-02 PROCEDURE — 73060 X-RAY EXAM OF HUMERUS: CPT | Mod: TC | Performed by: RADIOLOGY

## 2022-08-02 PROCEDURE — 71101 X-RAY EXAM UNILAT RIBS/CHEST: CPT | Mod: TC | Performed by: RADIOLOGY

## 2022-08-02 PROCEDURE — 99214 OFFICE O/P EST MOD 30 MIN: CPT | Performed by: PHYSICIAN ASSISTANT

## 2022-08-02 RX ORDER — HYDROCODONE BITARTRATE AND ACETAMINOPHEN 5; 325 MG/1; MG/1
1 TABLET ORAL EVERY 6 HOURS PRN
Qty: 18 TABLET | Refills: 0 | Status: SHIPPED | OUTPATIENT
Start: 2022-08-02 | End: 2022-08-05

## 2022-08-02 RX ORDER — PREDNISONE 20 MG/1
20 TABLET ORAL DAILY
Qty: 5 TABLET | Refills: 0 | Status: SHIPPED | OUTPATIENT
Start: 2022-08-02 | End: 2023-11-22

## 2022-08-02 RX ORDER — FLUTICASONE PROPIONATE 50 MCG
2 SPRAY, SUSPENSION (ML) NASAL DAILY
Qty: 18.2 ML | Refills: 3 | Status: SHIPPED | OUTPATIENT
Start: 2022-08-02 | End: 2023-11-22

## 2022-08-02 NOTE — PATIENT INSTRUCTIONS
(S49.92XA) Injury of left upper arm, initial encounter  (primary encounter diagnosis)  Comment:   Plan: XR Humerus Left G/E 2 Views,         HYDROcodone-acetaminophen (NORCO) 5-325 MG         tablet            (R07.81) Rib pain on left side  Comment:   Plan: XR Ribs & Chest Left G/E 3 Views,         HYDROcodone-acetaminophen (NORCO) 5-325 MG         tablet            (R05.9) Cough  Comment: with underlying allergies  Plan: XR Ribs & Chest Left G/E 3 Views, predniSONE         (DELTASONE) 20 MG tablet        Flonase as directed for 2 weeks minimum.  Saline nasal spray in the morning to help prevent dry nose.      Radiology report pending

## 2022-08-02 NOTE — PROGRESS NOTES
Patient presents with:  Shortness of Breath: Rib pain on the rt side  Arm Pain: Rt arm, fell on Sunday    (S49.92XA) Injury of left upper arm, initial encounter  (primary encounter diagnosis)  Comment:   Plan: XR Humerus Left G/E 2 Views,         HYDROcodone-acetaminophen (NORCO) 5-325 MG         tablet            (R07.81) Rib pain on left side  Comment:   Plan: XR Ribs & Chest Left G/E 3 Views,         HYDROcodone-acetaminophen (NORCO) 5-325 MG         tablet            (R05.9) Cough  Comment: with underlying allergies  Plan: XR Ribs & Chest Left G/E 3 Views, predniSONE         (DELTASONE) 20 MG tablet        Flonase as directed for 2 weeks minimum.  Saline nasal spray in the morning to help prevent dry nose.      Radiology report pending        SUBJECTIVE:   Aylin Harding is a 61 year old female who presents today with left arm and left sided rib pain since a fall on 7/31/22.      Also complains of slight cough today.     Has had intermittent vertigo symptoms with head movement.  Denies any headaches.  Has had in past.       Past Medical History:   Diagnosis Date     Asthma      Bronchiectasis 9/5/08     Hormone replacement therapy 2011     Osteoporosis 2001    followed by PCP (Dr. Springer)     Pneumonia, organism unspecified(486)      Uncomplicated asthma          Current Outpatient Medications   Medication Sig Dispense Refill     Multiple Vitamins-Iron (DAILY-ALONDRA/IRON/BETA-CAROTENE) TABS TAKE 1 TABLET BY MOUTH DAILY. (Patient not taking: Reported on 10/19/2020) 30 tablet 7     Social History     Tobacco Use     Smoking status: Never Smoker     Smokeless tobacco: Never Used   Substance Use Topics     Alcohol use: Not on file     Family History   Problem Relation Age of Onset     Diabetes Mother      Diabetes Father          ROS:    10 point ROS of systems including Constitutional, Eyes, Respiratory, Cardiovascular, Gastroenterology, Genitourinary, Integumentary, Muscularskeletal, Psychiatric ,neurological  were all negative except for pertinent positives noted in my HPI       OBJECTIVE:  /71 (BP Location: Right arm, Patient Position: Sitting, Cuff Size: Adult Regular)   Pulse 79   LMP  (LMP Unknown)   SpO2 97%   Physical Exam:  GENERAL APPEARANCE: healthy, alert and no distress  EYES: EOMI,  PERRL, conjunctiva clear  HENT: ear canals and TM's normal.  Nose and mouth without ulcers, erythema or lesions  HENT: nasal turbinates boggy with bluish hue and rhinorrhea clear  NECK: supple, nontender, no lymphadenopathy  RESP: lungs clear to auscultation - no rales, rhonchi or wheezes  CHEST: tenderness at left lateral mid ribs  CV: regular rates and rhythm, normal S1 S2, no murmur noted  Extremities: left arm: ecchymosis lateral mid arm  SKIN: no suspicious lesions or rashes    X-Ray was done, my findings are: No acute fractures or infiltrates appreciated.

## 2022-10-15 ENCOUNTER — HEALTH MAINTENANCE LETTER (OUTPATIENT)
Age: 62
End: 2022-10-15

## 2023-01-07 ENCOUNTER — OFFICE VISIT (OUTPATIENT)
Dept: URGENT CARE | Facility: URGENT CARE | Age: 63
End: 2023-01-07
Payer: COMMERCIAL

## 2023-01-07 VITALS
HEART RATE: 93 BPM | OXYGEN SATURATION: 94 % | SYSTOLIC BLOOD PRESSURE: 104 MMHG | TEMPERATURE: 98.6 F | WEIGHT: 167.6 LBS | BODY MASS INDEX: 25.48 KG/M2 | RESPIRATION RATE: 16 BRPM | DIASTOLIC BLOOD PRESSURE: 71 MMHG

## 2023-01-07 DIAGNOSIS — J47.1 BRONCHIECTASIS WITH ACUTE EXACERBATION (H): Primary | ICD-10-CM

## 2023-01-07 DIAGNOSIS — R06.2 WHEEZE: ICD-10-CM

## 2023-01-07 PROCEDURE — 99214 OFFICE O/P EST MOD 30 MIN: CPT | Performed by: PHYSICIAN ASSISTANT

## 2023-01-07 RX ORDER — PREDNISONE 20 MG/1
TABLET ORAL
Qty: 18 TABLET | Refills: 1 | Status: SHIPPED | OUTPATIENT
Start: 2023-01-07 | End: 2023-11-22

## 2023-01-07 RX ORDER — IPRATROPIUM BROMIDE AND ALBUTEROL SULFATE 2.5; .5 MG/3ML; MG/3ML
1 SOLUTION RESPIRATORY (INHALATION) EVERY 6 HOURS PRN
Qty: 1 ML | Refills: 1 | Status: SHIPPED | OUTPATIENT
Start: 2023-01-07 | End: 2023-11-22

## 2023-01-07 RX ORDER — ALBUTEROL SULFATE 90 UG/1
2 AEROSOL, METERED RESPIRATORY (INHALATION) EVERY 6 HOURS
Qty: 18 G | Refills: 0 | Status: SHIPPED | OUTPATIENT
Start: 2023-01-07 | End: 2023-11-22

## 2023-01-07 NOTE — PATIENT INSTRUCTIONS
(J47.1) Bronchiectasis with acute exacerbation (H)  (primary encounter diagnosis)  Comment:   Plan: amoxicillin-clavulanate (AUGMENTIN) 875-125 MG         tablet, predniSONE (DELTASONE) 20 MG tablet,         albuterol (PROAIR HFA/PROVENTIL HFA/VENTOLIN         HFA) 108 (90 Base) MCG/ACT inhaler            (R06.2) Wheeze  Comment:   Plan: ipratropium - albuterol 0.5 mg/2.5 mg/3 mL         (DUONEB) 0.5-2.5 (3) MG/3ML neb solution

## 2023-01-07 NOTE — PROGRESS NOTES
Patient presents with:  Cough: Persistent cough for the last days.     (J47.1) Bronchiectasis with acute exacerbation (H)  (primary encounter diagnosis)  Comment:   Plan: amoxicillin-clavulanate (AUGMENTIN) 875-125 MG         tablet, predniSONE (DELTASONE) 20 MG tablet,         albuterol (PROAIR HFA/PROVENTIL HFA/VENTOLIN         HFA) 108 (90 Base) MCG/ACT inhaler            (R06.2) Wheeze  Comment:   Plan: ipratropium - albuterol 0.5 mg/2.5 mg/3 mL         (DUONEB) 0.5-2.5 (3) MG/3ML neb solution            If not improving or if condition worsens, follow up with your Primary Care Provider      37 minutes spent on the date of the encounter doing chart review, patient visit and documentation       SUBJECTIVE:   Aylin Harding is a 62 year old female who presents today with sinus congestion and a cough for the past couple of weeks.  Was treated with Doxycycline and a lower dose of steroid by her pulmonologist with little relief.  No fevers.      Took 30mg prednisone yesterday did 3 nebs yesterday and was finally able to sleep    Has been taking dayquil for 2 weeks.     Feels less short of breath today but chest feels rattly.      Past Medical History:   Diagnosis Date     Asthma      Bronchiectasis 9/5/08     Hormone replacement therapy 2011     Osteoporosis 2001    followed by PCP (Dr. Springer)     Pneumonia, organism unspecified(486)      Uncomplicated asthma          Current Outpatient Medications   Medication Sig Dispense Refill     Multiple Vitamins-Iron (DAILY-ALONDRA/IRON/BETA-CAROTENE) TABS TAKE 1 TABLET BY MOUTH DAILY. (Patient not taking: Reported on 10/19/2020) 30 tablet 7     Social History     Tobacco Use     Smoking status: Never Smoker     Smokeless tobacco: Never Used   Substance Use Topics     Alcohol use: Not on file     Family History   Problem Relation Age of Onset     Diabetes Mother      Diabetes Father          ROS:    10 point ROS of systems including Constitutional, Eyes, Respiratory,  Cardiovascular, Gastroenterology, Genitourinary, Integumentary, Muscularskeletal, Psychiatric ,neurological were all negative except for pertinent positives noted in my HPI       OBJECTIVE:  /71 (BP Location: Left arm, Patient Position: Sitting, Cuff Size: Adult Regular)   Pulse 93   Temp 98.6  F (37  C) (Oral)   Resp 16   Wt 76 kg (167 lb 9.6 oz)   LMP  (LMP Unknown)   SpO2 94%   BMI 25.48 kg/m    Physical Exam:  GENERAL APPEARANCE: healthy, alert and no distress  EYES: EOMI,  PERRL, conjunctiva clear  HENT: ear canals and TM's normal.  Nose and mouth without ulcers, erythema or lesions  NECK: supple, nontender, no lymphadenopathy  RESP:rhonchi and wheezing  CV: regular rates and rhythm, normal S1 S2, no murmur noted  NEURO: Normal strength and tone, sensory exam grossly normal,  normal speech and mentation  SKIN: no suspicious lesions or rashes

## 2023-01-13 ENCOUNTER — TRANSFERRED RECORDS (OUTPATIENT)
Dept: HEALTH INFORMATION MANAGEMENT | Facility: CLINIC | Age: 63
End: 2023-01-13

## 2023-04-20 ENCOUNTER — PATIENT OUTREACH (OUTPATIENT)
Dept: CARE COORDINATION | Facility: CLINIC | Age: 63
End: 2023-04-20
Payer: COMMERCIAL

## 2023-05-04 ENCOUNTER — PATIENT OUTREACH (OUTPATIENT)
Dept: CARE COORDINATION | Facility: CLINIC | Age: 63
End: 2023-05-04
Payer: COMMERCIAL

## 2023-05-22 ENCOUNTER — MYC REFILL (OUTPATIENT)
Dept: OTHER | Facility: CLINIC | Age: 63
End: 2023-05-22
Payer: COMMERCIAL

## 2023-05-22 DIAGNOSIS — J47.9 BRONCHIECTASIS WITHOUT COMPLICATION (H): ICD-10-CM

## 2023-05-25 ENCOUNTER — LAB (OUTPATIENT)
Dept: LAB | Facility: CLINIC | Age: 63
End: 2023-05-25
Payer: COMMERCIAL

## 2023-05-25 DIAGNOSIS — R79.89 ABNORMAL TSH: ICD-10-CM

## 2023-05-25 DIAGNOSIS — Z00.00 ANNUAL PHYSICAL EXAM: ICD-10-CM

## 2023-05-25 LAB
ALBUMIN SERPL BCG-MCNC: 4.3 G/DL (ref 3.5–5.2)
ALP SERPL-CCNC: 107 U/L (ref 35–104)
ALT SERPL W P-5'-P-CCNC: 23 U/L (ref 10–35)
ANION GAP SERPL CALCULATED.3IONS-SCNC: 11 MMOL/L (ref 7–15)
AST SERPL W P-5'-P-CCNC: 24 U/L (ref 10–35)
BASOPHILS # BLD AUTO: 0.1 10E3/UL (ref 0–0.2)
BASOPHILS NFR BLD AUTO: 0 %
BILIRUB SERPL-MCNC: 0.8 MG/DL
BUN SERPL-MCNC: 18.6 MG/DL (ref 8–23)
CALCIUM SERPL-MCNC: 8.9 MG/DL (ref 8.8–10.2)
CHLORIDE SERPL-SCNC: 107 MMOL/L (ref 98–107)
CHOLEST SERPL-MCNC: 154 MG/DL
CREAT SERPL-MCNC: 0.96 MG/DL (ref 0.51–0.95)
DEPRECATED HCO3 PLAS-SCNC: 25 MMOL/L (ref 22–29)
EOSINOPHIL # BLD AUTO: 0.2 10E3/UL (ref 0–0.7)
EOSINOPHIL NFR BLD AUTO: 2 %
ERYTHROCYTE [DISTWIDTH] IN BLOOD BY AUTOMATED COUNT: 12 % (ref 10–15)
GFR SERPL CREATININE-BSD FRML MDRD: 66 ML/MIN/1.73M2
GLUCOSE SERPL-MCNC: 89 MG/DL (ref 70–99)
HCT VFR BLD AUTO: 43.5 % (ref 35–47)
HDLC SERPL-MCNC: 71 MG/DL
HGB BLD-MCNC: 14.4 G/DL (ref 11.7–15.7)
IMM GRANULOCYTES # BLD: 0 10E3/UL
IMM GRANULOCYTES NFR BLD: 0 %
LDLC SERPL CALC-MCNC: 69 MG/DL
LYMPHOCYTES # BLD AUTO: 1.7 10E3/UL (ref 0.8–5.3)
LYMPHOCYTES NFR BLD AUTO: 15 %
MCH RBC QN AUTO: 28.9 PG (ref 26.5–33)
MCHC RBC AUTO-ENTMCNC: 33.1 G/DL (ref 31.5–36.5)
MCV RBC AUTO: 87 FL (ref 78–100)
MONOCYTES # BLD AUTO: 0.5 10E3/UL (ref 0–1.3)
MONOCYTES NFR BLD AUTO: 5 %
NEUTROPHILS # BLD AUTO: 8.9 10E3/UL (ref 1.6–8.3)
NEUTROPHILS NFR BLD AUTO: 78 %
NONHDLC SERPL-MCNC: 83 MG/DL
PLATELET # BLD AUTO: 220 10E3/UL (ref 150–450)
POTASSIUM SERPL-SCNC: 4.1 MMOL/L (ref 3.4–5.3)
PROT SERPL-MCNC: 6.7 G/DL (ref 6.4–8.3)
RBC # BLD AUTO: 4.98 10E6/UL (ref 3.8–5.2)
SODIUM SERPL-SCNC: 143 MMOL/L (ref 136–145)
T3FREE SERPL-MCNC: 2.5 PG/ML (ref 2–4.4)
T4 FREE SERPL-MCNC: 1.24 NG/DL (ref 0.9–1.7)
TRIGL SERPL-MCNC: 72 MG/DL
TSH SERPL DL<=0.005 MIU/L-ACNC: 3.45 UIU/ML (ref 0.3–4.2)
WBC # BLD AUTO: 11.5 10E3/UL (ref 4–11)

## 2023-05-25 PROCEDURE — 80061 LIPID PANEL: CPT

## 2023-05-25 PROCEDURE — 84439 ASSAY OF FREE THYROXINE: CPT

## 2023-05-25 PROCEDURE — 84481 FREE ASSAY (FT-3): CPT

## 2023-05-25 PROCEDURE — 80050 GENERAL HEALTH PANEL: CPT

## 2023-05-25 PROCEDURE — 36415 COLL VENOUS BLD VENIPUNCTURE: CPT

## 2023-05-30 ENCOUNTER — TELEPHONE (OUTPATIENT)
Dept: OTHER | Facility: CLINIC | Age: 63
End: 2023-05-30
Payer: COMMERCIAL

## 2023-05-30 RX ORDER — HYDROXYCHLOROQUINE SULFATE 200 MG/1
200 TABLET, FILM COATED ORAL DAILY
Qty: 360 TABLET | Refills: 0 | Status: SHIPPED | OUTPATIENT
Start: 2023-05-30 | End: 2023-06-13

## 2023-05-30 NOTE — TELEPHONE ENCOUNTER
----- Message from Erick Springer MD sent at 5/30/2023 11:40 AM CDT -----  Please arrange f/u appointment with myself.       Appt Note:  Annual Exam - PCP patient - LOV 5/5/22   Fastng labs completed 5/25/23.   In clinic.

## 2023-06-11 ENCOUNTER — HEALTH MAINTENANCE LETTER (OUTPATIENT)
Age: 63
End: 2023-06-11

## 2023-06-13 ENCOUNTER — OFFICE VISIT (OUTPATIENT)
Dept: OTHER | Facility: CLINIC | Age: 63
End: 2023-06-13
Attending: INTERNAL MEDICINE
Payer: COMMERCIAL

## 2023-06-13 ENCOUNTER — TELEPHONE (OUTPATIENT)
Dept: OTHER | Facility: CLINIC | Age: 63
End: 2023-06-13

## 2023-06-13 VITALS
DIASTOLIC BLOOD PRESSURE: 71 MMHG | WEIGHT: 165.2 LBS | BODY MASS INDEX: 24.47 KG/M2 | SYSTOLIC BLOOD PRESSURE: 108 MMHG | OXYGEN SATURATION: 99 % | HEIGHT: 69 IN | HEART RATE: 69 BPM

## 2023-06-13 DIAGNOSIS — Z00.00 ANNUAL PHYSICAL EXAM: ICD-10-CM

## 2023-06-13 DIAGNOSIS — J47.9 BRONCHIECTASIS WITHOUT COMPLICATION (H): ICD-10-CM

## 2023-06-13 DIAGNOSIS — R79.89 ABNORMAL TSH: ICD-10-CM

## 2023-06-13 PROCEDURE — 99396 PREV VISIT EST AGE 40-64: CPT | Performed by: INTERNAL MEDICINE

## 2023-06-13 PROCEDURE — G0463 HOSPITAL OUTPT CLINIC VISIT: HCPCS

## 2023-06-13 RX ORDER — HYDROXYCHLOROQUINE SULFATE 200 MG/1
200 TABLET, FILM COATED ORAL DAILY
Qty: 90 TABLET | Refills: 3 | Status: SHIPPED | OUTPATIENT
Start: 2023-06-13 | End: 2024-06-06

## 2023-06-13 RX ORDER — HYDROXYCHLOROQUINE SULFATE 200 MG/1
200 TABLET, FILM COATED ORAL DAILY
Qty: 365 TABLET | Refills: 0 | Status: SHIPPED | OUTPATIENT
Start: 2023-06-13 | End: 2023-06-13

## 2023-06-13 NOTE — TELEPHONE ENCOUNTER
[facility]    Who is the name of the provider?:  Dr Springer   What is the location you see this provider at/preferred location?: [place]  Person calling / Facility: [facility]  Phone number:  [phone]  Nurse call back needed:  yes     Reason for call:  Prescription was signed for PLAQUENIL with a quantity of 365 caller wants to know what the correct quantity is.    Pharmacy location:  [address]  Outside Imaging: n/a   Can we leave a detailed message on this number?  n/a

## 2023-06-13 NOTE — PROGRESS NOTES
"Patient is here to discuss follow up    /71 (BP Location: Right arm, Patient Position: Chair, Cuff Size: Adult Regular)   Pulse 69   Ht 5' 9\" (1.753 m)   Wt 165 lb 3.2 oz (74.9 kg)   LMP  (LMP Unknown)   SpO2 99%   BMI 24.40 kg/m      Questions patient would like addressed today are: N/A.    Refills are needed: No    Has homecare services and agency name:  [facility]    "

## 2023-06-13 NOTE — TELEPHONE ENCOUNTER
Will change refill to state 90 day supply with 3 refills.   Called pharmacy back, left vm explaining this.     JACE OlivaresN, RN  [facility]  Office:  [phone] Fax: [phone]

## 2023-06-13 NOTE — PROGRESS NOTES
SUBJECTIVE:    CC: Aylin Harding is a 62 year old female who presents for:       Annual physical exam  Bronchiectasis without complication (H)  Abnormal TSH        PAST MEDICAL HISTORY:                  Past Medical History:   Diagnosis Date     Asthma      Bronchiectasis 9/5/08     Hormone replacement therapy 2011     Osteoporosis 2001    followed by PCP (Dr. Springer)     Pneumonia, organism unspecified(486)      Uncomplicated asthma        PAST SURGICAL HISTORY:                  Past Surgical History:   Procedure Laterality Date     CARPAL TUNNEL RELEASE RT/LT  2009     [facility] ORAL SURGERY PROCEDURE         CURRENT MEDICATIONS:                  Current Outpatient Medications   Medication Sig Dispense Refill     hydroxychloroquine (PLAQUENIL) 200 MG tablet Take 1 tablet (200 mg) by mouth daily 360 tablet 0     TRELEGY ELLIPTA 100-62.5-25 MCG/INH oral inhaler        albuterol (PROAIR HFA/PROVENTIL HFA/VENTOLIN HFA) 108 (90 Base) MCG/ACT inhaler Inhale 2 puffs into the lungs every 6 hours 18 g 0     albuterol (PROAIR HFA/PROVENTIL HFA/VENTOLIN HFA) 108 (90 Base) MCG/ACT inhaler Inhale 2 puffs into the lungs every 6 hours 18 g 3     amoxicillin-clavulanate (AUGMENTIN) 875-125 MG tablet        doxycycline hyclate (VIBRA-TABS) 100 MG tablet        fluticasone (FLONASE) 50 MCG/ACT nasal spray Spray 2 sprays into both nostrils daily 18.2 mL 3     ipratropium - albuterol 0.5 mg/2.5 mg/3 mL (DUONEB) 0.5-2.5 (3) MG/3ML neb solution Take 1 vial (3 mLs) by nebulization every 6 hours as needed for shortness of breath or wheezing 1 mL 1     predniSONE (DELTASONE) 20 MG tablet 3 po QD for 3 days, then 2 po QD for 3 days, then 1 po QD for 3 days. 18 tablet 1     predniSONE (DELTASONE) 20 MG tablet Take 1 tablet (20 mg) by mouth daily 5 tablet 0       ALLERGIES:                  Allergies   Allergen Reactions     Mold        SOCIAL HISTORY:                  Social History     Socioeconomic History     Marital status:       Spouse name: kerri     Number of children: 2     Years of education: 14     Highest education level: Not on file   Occupational History     Occupation: manager     Employer: [employer]   Tobacco Use     Smoking status: Never     Smokeless tobacco: Never   Vaping Use     Vaping status: Not on file   Substance and Sexual Activity     Alcohol use: Yes     Alcohol/week: 0.0 standard drinks of alcohol     Comment: rare socially     Drug use: No     Sexual activity: Yes     Partners: Male     Birth control/protection: Post-menopausal   Other Topics Concern     Parent/sibling w/ CABG, MI or angioplasty before 65F 55M? Not Asked   Social History Narrative     Not on file     Social Determinants of Health     Financial Resource Strain: Not on file   Food Insecurity: Not on file   Transportation Needs: Not on file   Physical Activity: Not on file   Stress: Not on file   Social Connections: Not on file   Intimate Partner Violence: Not on file   Housing Stability: Not on file       FAMILY HISTORY:                   Family History   Problem Relation Age of Onset     Arthritis Mother         b:1937     Cancer Mother      Arthritis Father         b:1934     Family History Negative Sister         2 sisters b:1958,1962     Family History Negative Brother         2 brothers b:1960,1963     No Known Problems Maternal Grandmother      No Known Problems Maternal Grandfather      No Known Problems Paternal Grandmother      No Known Problems Other      No Known Problems Brother      No Known Problems Sister              CONSTITUTIONAL: no malaise, fatigue, or other general symptoms  EYES: no subjective changes in visual acuity, no photophobia  ENT/MOUTH: no complaints of rhinorrhea, nasal congestion, sore throat, hearing changes  RESP: no SOB  CV: no c/o exertional chest pressure or RATLIFF  GI: No abdominal pain, constipation, change in bowel movements, nausea, pyrosis, BRBPR  : no polyuria or polydipsia, no dysuria, no gross  "hematuria  MUSCULOSKELETAL: no arthalgias or myalgias  INTEGUMENTARY/SKIN: no pruritis, rashes, or moles with recent change in size, shape, or pigmentation  BREAST: no lumps, masses, or discharges  NEURO: no gross sensory or motor symptoms, no dizziness, no confusion  ENDOCRINE: no polyuria or polydipsia, no heat or cold intolerance  HEME/ALLERGY/IMMUNE: no fevers, chills, night sweats, or unwanted weight loss  PSYCHIATRIC: no depression, anxiety, or internal stimuli        EXAM:    /71 (BP Location: Right arm, Patient Position: Chair, Cuff Size: Adult Regular)   Pulse 69   Ht 5' 9\" (1.753 m)   Wt 165 lb 3.2 oz (74.9 kg)   LMP  (LMP Unknown)   SpO2 99%   BMI 24.40 kg/m    BMI: Body mass index is 24.4 kg/m .    GENERAL APPEARANCE:healthy, alert and no distress    ORGAN EXAMS:               EYES: clear conjunctiva, no cataracts, no obvious fundoscopic abnormalities               HENT: oropharynx, nares, and TMs are WNL               NECK: no JVD, thyromegaly or lymphadenopathy, no cervical bruits               RESP: wheezing               CV: RRR, no murmurs, gallops, or rubs               LYMPH: no cervical , axillary, or inguinal lymphadenopathy appreciated               GI: NABS, ND/NT, no masses or organomegally appreciated               MS: no obvoius clinicallly relevant arthropathy, no evidence vasculitis               SKIN: no nevi clinically suspicious for malignancy are noted               NEURO: CN II-XII intact, no localizing sensory or motor abnoramlities noted, DTRs symmetrical bilaterally               PSYCH: Mental status exam reveals the pt to have normal mood and affect. There is no disorder of thought form or content. There is no response to internal stimuli. There is no suicidal or homicidal ideation.    Component      Latest Ref Rn 5/25/2023  8:25 AM   WBC      4.0 - 11.0 10e3/uL 11.5 (H)    RBC Count      3.80 - 5.20 10e6/uL 4.98    Hemoglobin      11.7 - 15.7 g/dL 14.4  "   Hematocrit      35.0 - 47.0 % 43.5    MCV      78 - 100 fL 87    MCH      26.5 - 33.0 pg 28.9    MCHC      31.5 - 36.5 g/dL 33.1    RDW      10.0 - 15.0 % 12.0    Platelet Count      150 - 450 10e3/uL 220    % Neutrophils      % 78    % Lymphocytes      % 15    % Monocytes      % 5    % Eosinophils      % 2    % Basophils      % 0    % Immature Granulocytes      % 0    Absolute Neutrophils      1.6 - 8.3 10e3/uL 8.9 (H)    Absolute Lymphocytes      0.8 - 5.3 10e3/uL 1.7    Absolute Monocytes      0.0 - 1.3 10e3/uL 0.5    Absolute Eosinophils      0.0 - 0.7 10e3/uL 0.2    Absolute Basophils      0.0 - 0.2 10e3/uL 0.1    Absolute Immature Granulocytes      <=0.4 10e3/uL 0.0    Sodium      136 - 145 mmol/L 143    Potassium      3.4 - 5.3 mmol/L 4.1    Chloride      98 - 107 mmol/L 107    Carbon Dioxide (CO2)      22 - 29 mmol/L 25    Anion Gap      7 - 15 mmol/L 11    Urea Nitrogen      8.0 - 23.0 mg/dL 18.6    Creatinine      0.51 - 0.95 mg/dL 0.96 (H)    Calcium      8.8 - 10.2 mg/dL 8.9    Glucose      70 - 99 mg/dL 89    Alkaline Phosphatase      35 - 104 U/L 107 (H)    AST      10 - 35 U/L 24    ALT      10 - 35 U/L 23    Protein Total      6.4 - 8.3 g/dL 6.7    Albumin      3.5 - 5.2 g/dL 4.3    Bilirubin Total      <=1.2 mg/dL 0.8    GFR Estimate      >60 mL/min/1.73m2 66    Cholesterol      <200 mg/dL 154    Triglycerides      <150 mg/dL 72    HDL Cholesterol      >=50 mg/dL 71    LDL Cholesterol Calculated      <=100 mg/dL 69    Non HDL Cholesterol      <130 mg/dL 83    TSH      0.30 - 4.20 uIU/mL 3.45    T4 Free      0.90 - 1.70 ng/dL 1.24    Free T3      2.0 - 4.4 pg/mL 2.5       Legend:  (H) High       ASSESSMENT/PLAN  (Z00.00) Annual physical exam  Comment: Doing well. Colonosocpy due to 2023. RTC one year.  Plan: CBC with Platelets & Differential,         Comprehensive metabolic panel, C-Reactive         Protein, High Sensitivity, Hemoglobin A1c,         LipoFit by NMR, Lipoprotein (a), T3 Free, T4          free, TSH            (J47.9) Bronchiectasis without complication (H)  Comment: Doing well, Would like to remain on the below.  Plan: hydroxychloroquine (PLAQUENIL) 200 MG tablet           (R79.89) Abnormal TSH  Comment: normalized  Plan: Check TFTs one year  '    20 minutes not spent on preventive care during this visit was spent providing medical care regarding item(s) # 2 onward as delineated above.                  I have discussed with patient the risks, benefits, medications, treatment options and modalities.   I have instructed the patient to call or schedule a follow-up appointment if any problems or failure to improve.

## 2023-10-31 ENCOUNTER — IMMUNIZATION (OUTPATIENT)
Dept: INTERNAL MEDICINE | Facility: CLINIC | Age: 63
End: 2023-10-31
Payer: COMMERCIAL

## 2023-10-31 PROCEDURE — 90678 RSV VACC PREF BIVALENT IM: CPT

## 2023-10-31 PROCEDURE — 90471 IMMUNIZATION ADMIN: CPT

## 2023-10-31 PROCEDURE — 90472 IMMUNIZATION ADMIN EACH ADD: CPT

## 2023-10-31 PROCEDURE — 90686 IIV4 VACC NO PRSV 0.5 ML IM: CPT

## 2023-11-22 ENCOUNTER — OFFICE VISIT (OUTPATIENT)
Dept: URGENT CARE | Facility: URGENT CARE | Age: 63
End: 2023-11-22
Payer: COMMERCIAL

## 2023-11-22 VITALS
BODY MASS INDEX: 23.78 KG/M2 | DIASTOLIC BLOOD PRESSURE: 73 MMHG | HEART RATE: 79 BPM | SYSTOLIC BLOOD PRESSURE: 112 MMHG | WEIGHT: 161 LBS | RESPIRATION RATE: 18 BRPM | TEMPERATURE: 98 F | OXYGEN SATURATION: 91 %

## 2023-11-22 DIAGNOSIS — J30.89 SEASONAL ALLERGIC RHINITIS DUE TO OTHER ALLERGIC TRIGGER: ICD-10-CM

## 2023-11-22 DIAGNOSIS — J47.1 BRONCHIECTASIS WITH ACUTE EXACERBATION (H): Primary | ICD-10-CM

## 2023-11-22 PROCEDURE — 99214 OFFICE O/P EST MOD 30 MIN: CPT | Performed by: PHYSICIAN ASSISTANT

## 2023-11-22 RX ORDER — PREDNISONE 10 MG/1
TABLET ORAL
COMMUNITY
Start: 2023-11-17 | End: 2023-12-02

## 2023-11-22 RX ORDER — PREDNISONE 20 MG/1
TABLET ORAL
Qty: 18 TABLET | Refills: 0 | Status: SHIPPED | OUTPATIENT
Start: 2023-11-22 | End: 2023-12-02

## 2023-11-22 RX ORDER — ALBUTEROL SULFATE 90 UG/1
2 AEROSOL, METERED RESPIRATORY (INHALATION) EVERY 6 HOURS
Qty: 18 G | Refills: 3 | Status: SHIPPED | OUTPATIENT
Start: 2023-11-22 | End: 2024-11-21

## 2023-11-22 NOTE — PROGRESS NOTES
Patient presents with:  Urgent Care: Laryngitis for couple of days 2 weeks ago - chest/cough hurts from coughing, sob worsening for the last week    J47.1) Bronchiectasis with acute exacerbation (H)  (primary encounter diagnosis)  Comment:   Plan: predniSONE (DELTASONE) 20 MG tablet, albuterol         (PROAIR HFA/PROVENTIL HFA/VENTOLIN HFA) 108 (90        Base) MCG/ACT inhaler, amoxicillin-clavulanate         (AUGMENTIN) 875-125 MG tablet       Continue DuoNebs at home.  Benadryl at bedtime.         Follow up with [facility] or Pulmonology should symptoms persist or worsen.        (J30.89) Seasonal allergic rhinitis due to other allergic trigger  Comment:   Plan: Continue daily Zyrtec and Flonase.  May stop the Flonase when we have had a good hard freeze outside.    At the end of the encounter, I discussed results, diagnosis, medications. Discussed red flags for immediate return to clinic/ER, as well as indications for follow up if no improvement. Patient understood and agreed to plan. Patient was stable for discharge       SUBJECTIVE:   Aylin Harding is a 63 year old female who presents with a significant exacerbation of her bronchiectasis  She follows with [facility].    Just finished Doxycycline 7 day course without relief.    Neb treatments only help for a short time.    Took Prednisone 20mg QAM and 10mg at bedtime.    Still very wheezy.      Patient Active Problem List   Diagnosis    Bronchiectasis (H)    CARDIOVASCULAR SCREENING; LDL GOAL LESS THAN 160    Osteoporosis    Uncomplicated asthma    Right-sided low back pain without sciatica         Past Medical History:   Diagnosis Date    Asthma     Bronchiectasis 9/5/08    Hormone replacement therapy 2011    Osteoporosis 2001    followed by PCP (Dr. Springer)    Pneumonia, organism unspecified(486)     Uncomplicated asthma          Current Outpatient Medications   Medication Sig Dispense Refill    Multiple Vitamins-Iron (DAILY-ALONDRA/IRON/BETA-CAROTENE) TABS  TAKE 1 TABLET BY MOUTH DAILY. (Patient not taking: Reported on 10/19/2020) 30 tablet 7     Social History     Tobacco Use    Smoking status: Never Smoker    Smokeless tobacco: Never Used   Substance Use Topics    Alcohol use: Not on file     Family History   Problem Relation Age of Onset    Diabetes Mother     Diabetes Father          ROS:    10 point ROS of systems including Constitutional, Eyes, Respiratory, Cardiovascular, Gastroenterology, Genitourinary, Integumentary, Muscularskeletal, Psychiatric ,neurological were all negative except for pertinent positives noted in my HPI       OBJECTIVE:  /73 (BP Location: Left arm, Patient Position: Sitting, Cuff Size: Adult Regular)   Pulse 79   Temp 98  F (36.7  C) (Tympanic)   Resp 18   Wt 73 kg (161 lb)   LMP  (LMP Unknown)   SpO2 91%   Breastfeeding No   BMI 23.78 kg/m    Physical Exam:  GENERAL APPEARANCE: healthy, alert and no distress  EYES: EOMI,  PERRL, conjunctiva clear  HENT: ear canals and TM's normal.  Nose and mouth without ulcers, erythema or lesions  HENT: nasal turbinates boggy with bluish hue  NECK: supple, nontender, no lymphadenopathy  RESP: Scattered rhonchi and wheezes throughout.  CV: regular rates and rhythm, normal S1 S2, no murmur noted  SKIN: no suspicious lesions or rashes

## 2023-11-22 NOTE — PATIENT INSTRUCTIONS
(J47.1) Bronchiectasis with acute exacerbation (H)  (primary encounter diagnosis)  Comment:   Plan: predniSONE (DELTASONE) 20 MG tablet, albuterol         (PROAIR HFA/PROVENTIL HFA/VENTOLIN HFA) 108 (90        Base) MCG/ACT inhaler, amoxicillin-clavulanate         (AUGMENTIN) 875-125 MG tablet            Follow up with [facility] or [facility]ology should symptoms persist or worsen.

## 2023-12-01 ENCOUNTER — MYC REFILL (OUTPATIENT)
Dept: OTHER | Facility: CLINIC | Age: 63
End: 2023-12-01
Payer: COMMERCIAL

## 2023-12-01 RX ORDER — PREDNISONE 10 MG/1
TABLET ORAL
OUTPATIENT
Start: 2023-12-01

## 2023-12-02 ENCOUNTER — HOSPITAL ENCOUNTER (INPATIENT)
Facility: CLINIC | Age: 63
LOS: 3 days | Discharge: HOME | DRG: 193 | End: 2023-12-05
Attending: EMERGENCY MEDICINE | Admitting: INTERNAL MEDICINE
Payer: COMMERCIAL

## 2023-12-02 ENCOUNTER — APPOINTMENT (OUTPATIENT)
Dept: CT IMAGING | Facility: CLINIC | Age: 63
DRG: 193 | End: 2023-12-02
Attending: INTERNAL MEDICINE
Payer: COMMERCIAL

## 2023-12-02 ENCOUNTER — APPOINTMENT (OUTPATIENT)
Dept: GENERAL RADIOLOGY | Facility: CLINIC | Age: 63
DRG: 193 | End: 2023-12-02
Attending: EMERGENCY MEDICINE
Payer: COMMERCIAL

## 2023-12-02 DIAGNOSIS — J47.1 BRONCHIECTASIS WITH ACUTE EXACERBATION (H): ICD-10-CM

## 2023-12-02 DIAGNOSIS — J96.01 ACUTE RESPIRATORY FAILURE WITH HYPOXIA (H): ICD-10-CM

## 2023-12-02 DIAGNOSIS — R91.8 PULMONARY INFILTRATE: ICD-10-CM

## 2023-12-02 DIAGNOSIS — J47.0 BRONCHIECTASIS WITH ACUTE LOWER RESPIRATORY INFECTION (H): Primary | ICD-10-CM

## 2023-12-02 LAB
ANION GAP SERPL CALCULATED.3IONS-SCNC: 14 MMOL/L (ref 7–15)
BASOPHILS # BLD AUTO: 0.1 10E3/UL (ref 0–0.2)
BASOPHILS NFR BLD AUTO: 0 %
BUN SERPL-MCNC: 21.7 MG/DL (ref 8–23)
CALCIUM SERPL-MCNC: 9.3 MG/DL (ref 8.8–10.2)
CHLORIDE SERPL-SCNC: 98 MMOL/L (ref 98–107)
CREAT SERPL-MCNC: 0.97 MG/DL (ref 0.51–0.95)
CRP SERPL-MCNC: 205.72 MG/L
DEPRECATED HCO3 PLAS-SCNC: 24 MMOL/L (ref 22–29)
EGFRCR SERPLBLD CKD-EPI 2021: 65 ML/MIN/1.73M2
EOSINOPHIL # BLD AUTO: 0.1 10E3/UL (ref 0–0.7)
EOSINOPHIL NFR BLD AUTO: 1 %
ERYTHROCYTE [DISTWIDTH] IN BLOOD BY AUTOMATED COUNT: 13.4 % (ref 10–15)
FLUAV RNA SPEC QL NAA+PROBE: NEGATIVE
FLUBV RNA RESP QL NAA+PROBE: NEGATIVE
GLUCOSE BLDC GLUCOMTR-MCNC: 178 MG/DL (ref 70–99)
GLUCOSE BLDC GLUCOMTR-MCNC: 240 MG/DL (ref 70–99)
GLUCOSE SERPL-MCNC: 89 MG/DL (ref 70–99)
HCO3 BLDV-SCNC: 23 MMOL/L (ref 21–28)
HCT VFR BLD AUTO: 46.1 % (ref 35–47)
HGB BLD-MCNC: 15.4 G/DL (ref 11.7–15.7)
HOLD SPECIMEN: NORMAL
IMM GRANULOCYTES # BLD: 0.2 10E3/UL
IMM GRANULOCYTES NFR BLD: 1 %
LACTATE BLD-SCNC: 1.1 MMOL/L
LYMPHOCYTES # BLD AUTO: 0.8 10E3/UL (ref 0.8–5.3)
LYMPHOCYTES NFR BLD AUTO: 3 %
MCH RBC QN AUTO: 29.8 PG (ref 26.5–33)
MCHC RBC AUTO-ENTMCNC: 33.4 G/DL (ref 31.5–36.5)
MCV RBC AUTO: 89 FL (ref 78–100)
MONOCYTES # BLD AUTO: 0.6 10E3/UL (ref 0–1.3)
MONOCYTES NFR BLD AUTO: 2 %
NEUTROPHILS # BLD AUTO: 24.8 10E3/UL (ref 1.6–8.3)
NEUTROPHILS NFR BLD AUTO: 93 %
NRBC # BLD AUTO: 0 10E3/UL
NRBC BLD AUTO-RTO: 0 /100
NT-PROBNP SERPL-MCNC: 177 PG/ML (ref 0–900)
PCO2 BLDV: 35 MM HG (ref 40–50)
PH BLDV: 7.44 [PH] (ref 7.32–7.43)
PLATELET # BLD AUTO: 223 10E3/UL (ref 150–450)
PO2 BLDV: 38 MM HG (ref 25–47)
POTASSIUM SERPL-SCNC: 4.2 MMOL/L (ref 3.4–5.3)
PROCALCITONIN SERPL IA-MCNC: 0.33 NG/ML
RBC # BLD AUTO: 5.17 10E6/UL (ref 3.8–5.2)
RSV RNA SPEC NAA+PROBE: NEGATIVE
SAO2 % BLDV: 74 % (ref 94–100)
SARS-COV-2 RNA RESP QL NAA+PROBE: NEGATIVE
SODIUM SERPL-SCNC: 136 MMOL/L (ref 135–145)
TROPONIN T SERPL HS-MCNC: 9 NG/L
WBC # BLD AUTO: 26.5 10E3/UL (ref 4–11)

## 2023-12-02 PROCEDURE — 71046 X-RAY EXAM CHEST 2 VIEWS: CPT

## 2023-12-02 PROCEDURE — 87637 SARSCOV2&INF A&B&RSV AMP PRB: CPT | Performed by: EMERGENCY MEDICINE

## 2023-12-02 PROCEDURE — 84484 ASSAY OF TROPONIN QUANT: CPT | Performed by: EMERGENCY MEDICINE

## 2023-12-02 PROCEDURE — 86140 C-REACTIVE PROTEIN: CPT | Performed by: EMERGENCY MEDICINE

## 2023-12-02 PROCEDURE — 120N000001 HC R&B MED SURG/OB

## 2023-12-02 PROCEDURE — 71260 CT THORAX DX C+: CPT

## 2023-12-02 PROCEDURE — 96374 THER/PROPH/DIAG INJ IV PUSH: CPT

## 2023-12-02 PROCEDURE — 84145 PROCALCITONIN (PCT): CPT | Performed by: EMERGENCY MEDICINE

## 2023-12-02 PROCEDURE — 94640 AIRWAY INHALATION TREATMENT: CPT | Mod: 76

## 2023-12-02 PROCEDURE — 85025 COMPLETE CBC W/AUTO DIFF WBC: CPT | Performed by: EMERGENCY MEDICINE

## 2023-12-02 PROCEDURE — 999N000157 HC STATISTIC RCP TIME EA 10 MIN

## 2023-12-02 PROCEDURE — 250N000011 HC RX IP 250 OP 636: Mod: JZ | Performed by: EMERGENCY MEDICINE

## 2023-12-02 PROCEDURE — 82803 BLOOD GASES ANY COMBINATION: CPT

## 2023-12-02 PROCEDURE — 250N000011 HC RX IP 250 OP 636: Performed by: INTERNAL MEDICINE

## 2023-12-02 PROCEDURE — 36415 COLL VENOUS BLD VENIPUNCTURE: CPT | Performed by: EMERGENCY MEDICINE

## 2023-12-02 PROCEDURE — 83880 ASSAY OF NATRIURETIC PEPTIDE: CPT | Performed by: EMERGENCY MEDICINE

## 2023-12-02 PROCEDURE — 250N000013 HC RX MED GY IP 250 OP 250 PS 637: Performed by: INTERNAL MEDICINE

## 2023-12-02 PROCEDURE — 82310 ASSAY OF CALCIUM: CPT | Performed by: EMERGENCY MEDICINE

## 2023-12-02 PROCEDURE — 250N000012 HC RX MED GY IP 250 OP 636 PS 637: Performed by: INTERNAL MEDICINE

## 2023-12-02 PROCEDURE — 258N000003 HC RX IP 258 OP 636: Performed by: EMERGENCY MEDICINE

## 2023-12-02 PROCEDURE — 250N000009 HC RX 250: Performed by: INTERNAL MEDICINE

## 2023-12-02 PROCEDURE — 96361 HYDRATE IV INFUSION ADD-ON: CPT

## 2023-12-02 PROCEDURE — 250N000009 HC RX 250: Performed by: EMERGENCY MEDICINE

## 2023-12-02 PROCEDURE — 94640 AIRWAY INHALATION TREATMENT: CPT

## 2023-12-02 PROCEDURE — 99223 1ST HOSP IP/OBS HIGH 75: CPT | Performed by: INTERNAL MEDICINE

## 2023-12-02 PROCEDURE — 99285 EMERGENCY DEPT VISIT HI MDM: CPT | Mod: 25

## 2023-12-02 PROCEDURE — 83605 ASSAY OF LACTIC ACID: CPT

## 2023-12-02 RX ORDER — DEXTROSE MONOHYDRATE 25 G/50ML
25-50 INJECTION, SOLUTION INTRAVENOUS
Status: DISCONTINUED | OUTPATIENT
Start: 2023-12-02 | End: 2023-12-05 | Stop reason: HOSPADM

## 2023-12-02 RX ORDER — ALBUTEROL SULFATE 0.83 MG/ML
2.5 SOLUTION RESPIRATORY (INHALATION)
Status: DISCONTINUED | OUTPATIENT
Start: 2023-12-02 | End: 2023-12-05 | Stop reason: HOSPADM

## 2023-12-02 RX ORDER — ACETYLCYSTEINE 200 MG/ML
4 SOLUTION ORAL; RESPIRATORY (INHALATION)
Status: DISCONTINUED | OUTPATIENT
Start: 2023-12-02 | End: 2023-12-05 | Stop reason: HOSPADM

## 2023-12-02 RX ORDER — METHYLPREDNISOLONE SODIUM SUCCINATE 125 MG/2ML
125 INJECTION, POWDER, LYOPHILIZED, FOR SOLUTION INTRAMUSCULAR; INTRAVENOUS ONCE
Status: COMPLETED | OUTPATIENT
Start: 2023-12-02 | End: 2023-12-02

## 2023-12-02 RX ORDER — CETIRIZINE HYDROCHLORIDE 10 MG/1
10 TABLET ORAL DAILY
Status: DISCONTINUED | OUTPATIENT
Start: 2023-12-03 | End: 2023-12-05 | Stop reason: HOSPADM

## 2023-12-02 RX ORDER — ZINC SULFATE 50(220)MG
220 CAPSULE ORAL DAILY
COMMUNITY

## 2023-12-02 RX ORDER — PROCHLORPERAZINE 25 MG
25 SUPPOSITORY, RECTAL RECTAL EVERY 12 HOURS PRN
Status: DISCONTINUED | OUTPATIENT
Start: 2023-12-02 | End: 2023-12-05 | Stop reason: HOSPADM

## 2023-12-02 RX ORDER — GUAIFENESIN/DEXTROMETHORPHAN 100-10MG/5
10 SYRUP ORAL EVERY 4 HOURS PRN
Status: DISCONTINUED | OUTPATIENT
Start: 2023-12-02 | End: 2023-12-05 | Stop reason: HOSPADM

## 2023-12-02 RX ORDER — AMOXICILLIN 250 MG
2 CAPSULE ORAL 2 TIMES DAILY PRN
Status: DISCONTINUED | OUTPATIENT
Start: 2023-12-02 | End: 2023-12-05 | Stop reason: HOSPADM

## 2023-12-02 RX ORDER — NICOTINE POLACRILEX 4 MG
15-30 LOZENGE BUCCAL
Status: DISCONTINUED | OUTPATIENT
Start: 2023-12-02 | End: 2023-12-05 | Stop reason: HOSPADM

## 2023-12-02 RX ORDER — PANTOPRAZOLE SODIUM 40 MG/1
40 TABLET, DELAYED RELEASE ORAL
Status: DISCONTINUED | OUTPATIENT
Start: 2023-12-02 | End: 2023-12-05 | Stop reason: HOSPADM

## 2023-12-02 RX ORDER — CETIRIZINE HYDROCHLORIDE 10 MG/1
10 TABLET ORAL DAILY
COMMUNITY

## 2023-12-02 RX ORDER — VITAMIN B COMPLEX
1 TABLET ORAL DAILY
COMMUNITY

## 2023-12-02 RX ORDER — ONDANSETRON 2 MG/ML
4 INJECTION INTRAMUSCULAR; INTRAVENOUS EVERY 6 HOURS PRN
Status: DISCONTINUED | OUTPATIENT
Start: 2023-12-02 | End: 2023-12-05 | Stop reason: HOSPADM

## 2023-12-02 RX ORDER — ALBUTEROL SULFATE 5 MG/ML
2.5 SOLUTION RESPIRATORY (INHALATION) ONCE
Status: COMPLETED | OUTPATIENT
Start: 2023-12-02 | End: 2023-12-02

## 2023-12-02 RX ORDER — DOXYCYCLINE HYCLATE 20 MG
100 TABLET ORAL EVERY 12 HOURS SCHEDULED
Status: DISCONTINUED | OUTPATIENT
Start: 2023-12-02 | End: 2023-12-03

## 2023-12-02 RX ORDER — IPRATROPIUM BROMIDE AND ALBUTEROL SULFATE 2.5; .5 MG/3ML; MG/3ML
3 SOLUTION RESPIRATORY (INHALATION) ONCE
Status: COMPLETED | OUTPATIENT
Start: 2023-12-02 | End: 2023-12-02

## 2023-12-02 RX ORDER — METHYLPREDNISOLONE SODIUM SUCCINATE 125 MG/2ML
60 INJECTION, POWDER, LYOPHILIZED, FOR SOLUTION INTRAMUSCULAR; INTRAVENOUS EVERY 6 HOURS
Status: DISCONTINUED | OUTPATIENT
Start: 2023-12-02 | End: 2023-12-04

## 2023-12-02 RX ORDER — PIPERACILLIN SODIUM, TAZOBACTAM SODIUM 4; .5 G/20ML; G/20ML
4.5 INJECTION, POWDER, LYOPHILIZED, FOR SOLUTION INTRAVENOUS ONCE
Status: COMPLETED | OUTPATIENT
Start: 2023-12-02 | End: 2023-12-02

## 2023-12-02 RX ORDER — FLUTICASONE FUROATE AND VILANTEROL 200; 25 UG/1; UG/1
1 POWDER RESPIRATORY (INHALATION) DAILY
Status: DISCONTINUED | OUTPATIENT
Start: 2023-12-02 | End: 2023-12-03

## 2023-12-02 RX ORDER — METHYLPREDNISOLONE SODIUM SUCCINATE 125 MG/2ML
60 INJECTION, POWDER, LYOPHILIZED, FOR SOLUTION INTRAMUSCULAR; INTRAVENOUS EVERY 6 HOURS
Status: DISCONTINUED | OUTPATIENT
Start: 2023-12-02 | End: 2023-12-02

## 2023-12-02 RX ORDER — DOXYCYCLINE 100 MG/10ML
100 INJECTION, POWDER, LYOPHILIZED, FOR SOLUTION INTRAVENOUS EVERY 12 HOURS
Status: DISCONTINUED | OUTPATIENT
Start: 2023-12-02 | End: 2023-12-02

## 2023-12-02 RX ORDER — IPRATROPIUM BROMIDE AND ALBUTEROL SULFATE 2.5; .5 MG/3ML; MG/3ML
3 SOLUTION RESPIRATORY (INHALATION)
Status: DISCONTINUED | OUTPATIENT
Start: 2023-12-02 | End: 2023-12-05 | Stop reason: HOSPADM

## 2023-12-02 RX ORDER — VITAMIN B COMPLEX
25 TABLET ORAL DAILY
Status: DISCONTINUED | OUTPATIENT
Start: 2023-12-02 | End: 2023-12-05 | Stop reason: HOSPADM

## 2023-12-02 RX ORDER — PROCHLORPERAZINE MALEATE 5 MG
10 TABLET ORAL EVERY 6 HOURS PRN
Status: DISCONTINUED | OUTPATIENT
Start: 2023-12-02 | End: 2023-12-05 | Stop reason: HOSPADM

## 2023-12-02 RX ORDER — HYDROXYCHLOROQUINE SULFATE 200 MG/1
200 TABLET, FILM COATED ORAL DAILY
Status: DISCONTINUED | OUTPATIENT
Start: 2023-12-02 | End: 2023-12-05 | Stop reason: HOSPADM

## 2023-12-02 RX ORDER — AZITHROMYCIN 500 MG/1
500 INJECTION, POWDER, LYOPHILIZED, FOR SOLUTION INTRAVENOUS ONCE
Status: COMPLETED | OUTPATIENT
Start: 2023-12-02 | End: 2023-12-02

## 2023-12-02 RX ORDER — AMOXICILLIN 250 MG
1 CAPSULE ORAL 2 TIMES DAILY PRN
Status: DISCONTINUED | OUTPATIENT
Start: 2023-12-02 | End: 2023-12-05 | Stop reason: HOSPADM

## 2023-12-02 RX ORDER — PIPERACILLIN SODIUM, TAZOBACTAM SODIUM 3; .375 G/15ML; G/15ML
3.38 INJECTION, POWDER, LYOPHILIZED, FOR SOLUTION INTRAVENOUS EVERY 6 HOURS
Status: DISCONTINUED | OUTPATIENT
Start: 2023-12-02 | End: 2023-12-04

## 2023-12-02 RX ORDER — ZINC SULFATE 50(220)MG
220 CAPSULE ORAL DAILY
Status: DISCONTINUED | OUTPATIENT
Start: 2023-12-02 | End: 2023-12-05 | Stop reason: HOSPADM

## 2023-12-02 RX ORDER — ONDANSETRON 4 MG/1
4 TABLET, ORALLY DISINTEGRATING ORAL EVERY 6 HOURS PRN
Status: DISCONTINUED | OUTPATIENT
Start: 2023-12-02 | End: 2023-12-05 | Stop reason: HOSPADM

## 2023-12-02 RX ORDER — SODIUM CHLORIDE 9 MG/ML
INJECTION, SOLUTION INTRAVENOUS CONTINUOUS
Status: DISCONTINUED | OUTPATIENT
Start: 2023-12-02 | End: 2023-12-02

## 2023-12-02 RX ORDER — AZITHROMYCIN 250 MG/1
250 TABLET, FILM COATED ORAL DAILY
Status: CANCELLED | OUTPATIENT
Start: 2023-12-03 | End: 2023-12-07

## 2023-12-02 RX ORDER — GUAIFENESIN 600 MG/1
1200 TABLET, EXTENDED RELEASE ORAL 2 TIMES DAILY
Status: DISCONTINUED | OUTPATIENT
Start: 2023-12-02 | End: 2023-12-05 | Stop reason: HOSPADM

## 2023-12-02 RX ORDER — CALCIUM CARBONATE 500 MG/1
1000 TABLET, CHEWABLE ORAL 4 TIMES DAILY PRN
Status: DISCONTINUED | OUTPATIENT
Start: 2023-12-02 | End: 2023-12-05 | Stop reason: HOSPADM

## 2023-12-02 RX ORDER — LIDOCAINE 40 MG/G
CREAM TOPICAL
Status: DISCONTINUED | OUTPATIENT
Start: 2023-12-02 | End: 2023-12-05 | Stop reason: HOSPADM

## 2023-12-02 RX ORDER — IOPAMIDOL 755 MG/ML
81 INJECTION, SOLUTION INTRAVASCULAR ONCE
Status: COMPLETED | OUTPATIENT
Start: 2023-12-02 | End: 2023-12-02

## 2023-12-02 RX ADMIN — PIPERACILLIN AND TAZOBACTAM 3.38 G: 3; .375 INJECTION, POWDER, FOR SOLUTION INTRAVENOUS at 19:26

## 2023-12-02 RX ADMIN — ACETYLCYSTEINE 4 ML: 200 SOLUTION ORAL; RESPIRATORY (INHALATION) at 16:00

## 2023-12-02 RX ADMIN — PANTOPRAZOLE SODIUM 40 MG: 40 TABLET, DELAYED RELEASE ORAL at 17:52

## 2023-12-02 RX ADMIN — FLUTICASONE FUROATE AND VILANTEROL TRIFENATATE 1 PUFF: 200; 25 POWDER RESPIRATORY (INHALATION) at 18:46

## 2023-12-02 RX ADMIN — IPRATROPIUM BROMIDE AND ALBUTEROL SULFATE 3 ML: .5; 3 SOLUTION RESPIRATORY (INHALATION) at 15:15

## 2023-12-02 RX ADMIN — SODIUM CHLORIDE 1000 ML: 9 INJECTION, SOLUTION INTRAVENOUS at 12:08

## 2023-12-02 RX ADMIN — Medication 25 MCG: at 15:50

## 2023-12-02 RX ADMIN — ACETYLCYSTEINE 4 ML: 200 SOLUTION ORAL; RESPIRATORY (INHALATION) at 20:45

## 2023-12-02 RX ADMIN — DOXYCYCLINE HYCLATE 100 MG: 20 TABLET ORAL at 20:38

## 2023-12-02 RX ADMIN — IPRATROPIUM BROMIDE AND ALBUTEROL SULFATE 3 ML: .5; 3 SOLUTION RESPIRATORY (INHALATION) at 10:13

## 2023-12-02 RX ADMIN — GUAIFENESIN 1200 MG: 600 TABLET, EXTENDED RELEASE ORAL at 20:38

## 2023-12-02 RX ADMIN — PIPERACILLIN AND TAZOBACTAM 4.5 G: 4; .5 INJECTION, POWDER, FOR SOLUTION INTRAVENOUS at 13:35

## 2023-12-02 RX ADMIN — DOXYCYCLINE 100 MG: 100 INJECTION, POWDER, LYOPHILIZED, FOR SOLUTION INTRAVENOUS at 14:07

## 2023-12-02 RX ADMIN — SODIUM CHLORIDE 65 ML: 9 INJECTION, SOLUTION INTRAVENOUS at 14:50

## 2023-12-02 RX ADMIN — METHYLPREDNISOLONE SODIUM SUCCINATE 62.5 MG: 125 INJECTION INTRAMUSCULAR; INTRAVENOUS at 17:52

## 2023-12-02 RX ADMIN — ALBUTEROL SULFATE 2.5 MG: 2.5 SOLUTION RESPIRATORY (INHALATION) at 13:10

## 2023-12-02 RX ADMIN — IOPAMIDOL 81 ML: 755 INJECTION, SOLUTION INTRAVENOUS at 14:50

## 2023-12-02 RX ADMIN — Medication 25 MCG: at 17:52

## 2023-12-02 RX ADMIN — UMECLIDINIUM 1 PUFF: 62.5 AEROSOL, POWDER ORAL at 18:46

## 2023-12-02 RX ADMIN — ZINC SULFATE 220 MG (50 MG) CAPSULE 220 MG: CAPSULE at 15:50

## 2023-12-02 RX ADMIN — IPRATROPIUM BROMIDE AND ALBUTEROL SULFATE 3 ML: .5; 3 SOLUTION RESPIRATORY (INHALATION) at 20:45

## 2023-12-02 RX ADMIN — HYDROXYCHLOROQUINE SULFATE 200 MG: 200 TABLET ORAL at 21:56

## 2023-12-02 RX ADMIN — METHYLPREDNISOLONE SODIUM SUCCINATE 125 MG: 125 INJECTION, POWDER, FOR SOLUTION INTRAMUSCULAR; INTRAVENOUS at 12:11

## 2023-12-02 RX ADMIN — AZITHROMYCIN MONOHYDRATE 500 MG: 500 INJECTION, POWDER, LYOPHILIZED, FOR SOLUTION INTRAVENOUS at 15:18

## 2023-12-02 RX ADMIN — SODIUM CHLORIDE: 9 INJECTION, SOLUTION INTRAVENOUS at 09:54

## 2023-12-02 RX ADMIN — INSULIN ASPART 3 UNITS: 100 INJECTION, SOLUTION INTRAVENOUS; SUBCUTANEOUS at 18:45

## 2023-12-02 ASSESSMENT — ACTIVITIES OF DAILY LIVING (ADL)
ADLS_ACUITY_SCORE: 35
ADLS_ACUITY_SCORE: 22
ADLS_ACUITY_SCORE: 22
ADLS_ACUITY_SCORE: 35
ADLS_ACUITY_SCORE: 22
ADLS_ACUITY_SCORE: 35
ADLS_ACUITY_SCORE: 35

## 2023-12-02 NOTE — PROGRESS NOTES
PULMONARY NOTE    I am aware of the request for a Pulmonary consult on this patient. Chart reviewed. Agree with antibiotics, nebs and steroids. Additional infectious workup ordered. I will be by later today or tomorrow morning to complete the consultation. Please call or page with any urgent questions or concerns. Paged hospitalist to discuss.       Martínez Castanon MD    [facility] / [facility]  Office: [phone]  Pager: [phone]

## 2023-12-02 NOTE — PROGRESS NOTES
Admission    Patient arrives to room 614 via cart from ER.  Care plan note: Pt alert/oriented X4, lungs coarse, on 3.5 liters 93%.     Inpatient nursing criteria listed below were met:    Did you put disposition on whiteboard and in sticky note: NA  Full skin assessment done (add LDA if skin issue present). Initials of 2nd RN :Yes CR/VS  Isolation education started/completed NA  Patient allergies verified with patient: NA  Fall Risk? (Care plan updated, Education given and documented) NA  Primary Care Plan initiated: Yes  Home medications documented in belongings flowsheet: NA  Patient belongings documented in belongings flowsheet: Yes  Reminder note (belongings/ medications) placed in discharge instructions:NA  Admission profile/ required documentation complete: Yes  If patient is a 72 hour hold/Commitment are belongings removed from room and locked up? NA

## 2023-12-02 NOTE — ED TRIAGE NOTES
PT HAS HX OF LUNG DISEASE. PT IS SOB. PT REPORTS OXYGEN SATURATION IS 83% AT HOME. PT WAS SEEN BEFORE THANKSGIVING AND GIVEN HIGH DOSE STEROIDS.     Triage Assessment (Adult)       Row Name 12/02/23 0902          Triage Assessment    Airway WDL WDL        Respiratory WDL    Respiratory WDL X     Rhythm/Pattern, Respiratory shortness of breath

## 2023-12-02 NOTE — PHARMACY-ADMISSION MEDICATION HISTORY
Pharmacist Admission Medication History    Admission medication history is complete. The information provided in this note is only as accurate as the sources available at the time of the update.    Information Source(s): Patient and CareEverywhere/SureScripts via in-person    Pertinent Information:   - Recently increased trelegy   - Finished prednisone taper  - Finished augmentin course    Changes made to PTA medication list:  Added:   Zyrtec, Zinc, Vit D  Deleted:   Augmentin, Prednisone  Changed:    Trelegy 100-62.5-25 mcg/inh --> Trelegy 200-62.5-25 mcg/inh    Medication Affordability:       Allergies reviewed with patient and updates made in EHR: yes    Medication History Completed By: Clari Garcia [facility] 12/2/2023 12:56 PM    PTA Med List   Medication Sig Last Dose    albuterol (PROAIR HFA/PROVENTIL HFA/VENTOLIN HFA) 108 (90 Base) MCG/ACT inhaler Inhale 2 puffs into the lungs every 6 hours prn at prn    cetirizine (ZYRTEC) 10 MG tablet Take 10 mg by mouth daily 12/1/2023    Fluticasone-Umeclidin-Vilanterol (TRELEGY ELLIPTA) 200-62.5-25 MCG/ACT oral inhaler Inhale 1 puff into the lungs daily 12/1/2023 at pm    hydroxychloroquine (PLAQUENIL) 200 MG tablet Take 1 tablet (200 mg) by mouth daily 12/1/2023 at hs    Vitamin D3 (VITAMIN D, CHOLECALCIFEROL,) 25 mcg (1000 units) tablet Take 1 tablet by mouth daily 12/1/2023    zinc sulfate (ZINCATE) 220 (50 Zn) MG capsule Take 220 mg by mouth daily 12/1/2023

## 2023-12-02 NOTE — H&P
[facility]    History and Physical - Hospitalist Service       Date of Admission:  12/2/2023    Assessment & Plan      Aylin Harding is a 63 year old female admitted on 12/2/2023. She   Has history of bronchiectasis with history of pneumonias, history of asthma presents to the hospital with worsening shortness of breath, wheezing and cough since October.  At end of October patient started having cough with wheezing and shortness of breath since then she has been doing nebs 4 times a day.  It got to the point where it was getting worse, so she went and saw a physician in the clinic the day before Thanksgiving and she was given a prednisone taper starting with 60 mg coming down by 20 mg every 3days.  She was also given a prescription for oral Augmentin for 1 week which she took and completed the course yesterday.  Yesterday she went to her daughter's house felt cold there and then got home and was coughing all night with worsening shortness of breath and wheezing and was noted to be hypoxic to 83% on room air at home which made her come into the emergency room.  Chest x-ray showed bilateral reticulonodular opacities consistent with atypical pneumonia.  Patient was tachycardic tachypneic on arrival to the ED with respiratory rate in the high 30s and was satting 86% on room air needing oxygen by facemask.  She is currently satting 93% on 4 L of oxygen by nasal cannula      Acute hypoxic respiratory failure secondary to acute asthma exacerbation  History of bronchiectasis  Possible atypical pneumonia;  Patient has been having worsening shortness of breath and cough and wheezing since end of October.  Was prescribed prednisone taper and course of Augmentin starting on the day before Thanksgiving.  She is currently down to 20 mg of prednisone daily and completed course of Augmentin yesterday  While on high-dose steroids she felt better but recently she started feeling worse again  Admitted  to the hospital under inpatient status  High-dose steroids with Solu-Medrol 60 mg every 6 hours  DuoNebs every 4 hours while awake  Mucomyst nebs ordered  Mucinex twice daily  We will start her on IV Zosyn and doxycycline  course to cover for possible atypical pneumonia in the setting of acute bronchiectasis exacerbation  Continue Trelegy inhaler which she uses at baseline  Will request [place] [facility] team consultation as she is followed by them outpatient  We will start her on blood sugar checks 4 times daily and sliding scale insulin while on IV steroids  If there is no improvement with current treatment patient might need bronchoscopy as per the pulmonary team.  Appreciate assistance  We will evaluate her lungs further with CT scan of chest with contrast today  Oral Protonix ordered for GI prophylaxis while on steroids    History of seasonal allergies;  Continue Zyrtec        Diet: Regular Diet Adult    DVT Prophylaxis: Pneumatic Compression Devices and ambulate frequently  Springer Catheter: Not present  Lines: None     Cardiac Monitoring: None  Code Status:  Full code discussed with patient on admission    Clinically Significant Risk Factors Present on Admission                           # Asthma: noted on problem list        Disposition Plan      Expected Discharge Date: 12/04/2023                  Peggy Ponce MD  Hospitalist Service  [facility]  Securely message with [employer] (more info)  Text page via [employer] Paging/Directory     ______________________________________________________________________    Chief Complaint   Worsening shortness of breath and wheezing and hypoxia in a patient with history of bronchiectasis and asthma    History is obtained from the patient, electronic health record, and emergency department physician Dr. Sarah Huggins    History of Present Illness   Aylin Harding is a 63 year old female with  history of bronchiectasis with history of pneumonias,  history of asthma presents to the hospital with worsening shortness of breath, wheezing and cough since October.  At end of October patient started having cough with wheezing and shortness of breath since then she has been doing nebs 4 times a day.  It got to the point where it was getting worse, so she went and saw a physician in the clinic the day before Thanksgiving and she was given a prednisone taper starting with 60 mg coming down by 20 mg every 3days.  She was also given a prescription for oral Augmentin for 1 week which she took and completed the course yesterday.  Yesterday she went to her daughter's house felt cold there and then got home and was coughing all night with worsening shortness of breath and wheezing and was noted to be hypoxic to 83% on room air at home which made her come into the emergency room. She was evaluated by Dr. Sarah Huggins in the emergency room.  He was afebrile temperature 98.6  F was tachycardic with heart rate of 122 in the ED blood pressure of 109/61 was tachypneic with respirate of 36 and she was satting 96% on room air.  She was given 1 dose of 125 mg of Solu-Medrol for acute asthma exacerbation with bronchiectasis and albuterol nebs given.  She was placed on OxyMask to help with hypoxia.  1 L normal saline bolus was given.  Lab work in the ED showed WC count elevated at 26.5 hemoglobin 15.4 platelet count at 223  VBG showed hypoxia with a PO2 at 74%, pH at 7.44 PCO2 low at 35 consistent with mild respiratory alkalosis with hypoxia seen.  Lactate was normal at 1.1    BMP was normal with sodium at 136 potassium 4.2 chloride 98 bicarb 24 anion gap of 14 BUN 21.7 creatinine 0.97.  Blood glucose normal at 89  CRP elevated 205.72  BNP normal at 177  Procalcitonin normal at 0.33  Chest x-ray done showed heart size and vascularity are normal.  Chronic right middle lobe and lingular scarring redemonstrated.  Bilateral bronchial wall thickening with superimposed mid and lower lung  predominant reticulonodular opacities.  No pleural effusion.  Differential consideration would include atypical infection and aspiration    Request to hospitalization was made due to acute hypoxic respiratory failure with acute asthma exacerbation and community-acquired pneumonia      Past Medical History    Past Medical History:   Diagnosis Date    Asthma     Bronchiectasis 9/5/08    Hormone replacement therapy 2011    Osteoporosis 2001    followed by PCP (Dr. Springer)    Pneumonia, organism unspecified(486)     Uncomplicated asthma        Past Surgical History   Past Surgical History:   Procedure Laterality Date    CARPAL TUNNEL RELEASE RT/LT  2009    [facility] ORAL SURGERY PROCEDURE         Prior to Admission Medications   Prior to Admission Medications   Prescriptions Last Dose Informant Patient Reported? Taking?   Fluticasone-Umeclidin-Vilanterol (TRELEGY ELLIPTA) 200-62.5-25 MCG/ACT oral inhaler 12/1/2023 at pm  Yes Yes   Sig: Inhale 1 puff into the lungs daily   Vitamin D3 (VITAMIN D, CHOLECALCIFEROL,) 25 mcg (1000 units) tablet 12/1/2023  Yes Yes   Sig: Take 1 tablet by mouth daily   albuterol (PROAIR HFA/PROVENTIL HFA/VENTOLIN HFA) 108 (90 Base) MCG/ACT inhaler prn at prn  No Yes   Sig: Inhale 2 puffs into the lungs every 6 hours   cetirizine (ZYRTEC) 10 MG tablet 12/1/2023  Yes Yes   Sig: Take 10 mg by mouth daily   hydroxychloroquine (PLAQUENIL) 200 MG tablet 12/1/2023 at hs  No Yes   Sig: Take 1 tablet (200 mg) by mouth daily   zinc sulfate (ZINCATE) 220 (50 Zn) MG capsule 12/1/2023  Yes Yes   Sig: Take 220 mg by mouth daily      Facility-Administered Medications: None        Review of Systems    The 10 point Review of Systems is negative other than noted in the HPI     Social History   I have reviewed this patient's social history and updated it with pertinent information if needed.  Social History     Tobacco Use    Smoking status: Never    Smokeless tobacco: Never   Vaping Use    Vaping Use: Never used    Substance Use Topics    Alcohol use: Yes     Alcohol/week: 0.0 standard drinks of alcohol     Comment: rare socially    Drug use: No         Family History   I have reviewed this patient's family history and updated it with pertinent information if needed.  Family History   Problem Relation Age of Onset    Arthritis Mother         b:1937    Cancer Mother     Arthritis Father         b:1934    Family History Negative Sister         2 sisters b:1958,1962    Family History Negative Brother         2 brothers b:1960,1963    No Known Problems Maternal Grandmother     No Known Problems Maternal Grandfather     No Known Problems Paternal Grandmother     No Known Problems Other     No Known Problems Brother     No Known Problems Sister          Allergies   Allergies   Allergen Reactions    Mold         Physical Exam   Vital Signs: Temp: 98.6  F (37  C) Temp src: Oral BP: 107/65 Pulse: 93   Resp: 25 SpO2: 93 % O2 Device: Nasal cannula Oxygen Delivery: 4 LPM  Weight: 0 lbs 0 oz    General Appearance: Pleasant female sitting comfortably in bed, not in acute distress, mild tachypnea noted  Eyes: No scleral icterus or conjunctival injection  HEENT: Head is atraumatic normocephalic  Respiratory: Bilateral coarse breath sounds with occasional wheezing heard on auscultation  Cardiovascular: Normal rate rhythm regular, no murmurs  GI: Soft, nontender nondistended bowel sounds positive no guarding rigidity or rebound    Skin: Warm and dry, no rashes seen  Musculoskeletal: No clubbing cyanosis or edema  Neurologic: No focal weakness seen  Psychiatric: Mood and affect are normal    Medical Decision Making       80 MINUTES SPENT BY ME on the date of service doing chart review, history, exam, documentation & further activities per the note.      Data     I have personally reviewed the following data over the past 24 hrs:    26.5 (H)  \   15.4   / 223     136 98 21.7 /  89   4.2 24 0.97 (H) \     Trop: 9 BNP: 177     Procal: 0.33 CRP:  205.72 (H) Lactic Acid: 1.1         Imaging results reviewed over the past 24 hrs:   Recent Results (from the past 24 hour(s))   XR Chest 2 Views    Narrative    EXAM: XR CHEST 2 VIEWS  LOCATION: [facility]  DATE: 12/2/2023    INDICATION: Worsening shortness of breath and cough.  COMPARISON: 08/02/2022      Impression    IMPRESSION: Heart size and vascularity are normal. Chronic right middle lobe and lingular scarring redemonstrated. Bilateral bronchial wall thickening with superimposed mid and lower lung predominant reticulonodular opacities. No pleural effusion.   Differential considerations would include atypical infection and aspiration.

## 2023-12-02 NOTE — ED PROVIDER NOTES
History     Chief Complaint:  Shortness of Breath       HPI   Aylin Harding is a 63 year old female with a history of bronchiectasis comes in with worsening shortness of breath wheezing and cough since yesterday.  In October she started having some increased symptoms and was placed on some prednisone and doxycycline which often will help.  She got better for a while but then got worse again in November and the day before Thanksgiving she went to urgent care where they know her well.  She was placed on a higher dose of steroids and a course of Augmentin.  Again got better a little bit but then worse again the last few days.  She does use the nebulizer every 4 hours and has been using her rescue inhaler as well but continues to cough.  She has not had a fever, there is some phlegm but she cannot bring it up, she has noticed increased shortness of breath as well.  She takes trelegy as well.  Her pulmonologist is from [place] [facility] Dr Rivera.        Independent Historian:   None - Patient Only    Review of External Notes:   I reviewed the office note from 11/22/2023      Medications:    albuterol (PROAIR HFA/PROVENTIL HFA/VENTOLIN HFA) 108 (90 Base) MCG/ACT inhaler  cetirizine (ZYRTEC) 10 MG tablet  Fluticasone-Umeclidin-Vilanterol (TRELEGY ELLIPTA) 200-62.5-25 MCG/ACT oral inhaler  hydroxychloroquine (PLAQUENIL) 200 MG tablet  Vitamin D3 (VITAMIN D, CHOLECALCIFEROL,) 25 mcg (1000 units) tablet  zinc sulfate (ZINCATE) 220 (50 Zn) MG capsule        Past Medical History:    Past Medical History:   Diagnosis Date    Asthma     Bronchiectasis 9/5/08    Hormone replacement therapy 2011    Osteoporosis 2001    Pneumonia, organism unspecified(486)     Uncomplicated asthma        Past Surgical History:    Past Surgical History:   Procedure Laterality Date    CARPAL TUNNEL RELEASE RT/LT  2009    [facility] ORAL SURGERY PROCEDURE          Physical Exam   Patient Vitals for the past 24 hrs:   BP Temp Temp src Pulse Resp SpO2    12/02/23 1213 109/70 -- -- 101 22 92 %   12/02/23 1127 97/59 -- -- 108 25 91 %   12/02/23 1112 99/60 -- -- 103 26 90 %   12/02/23 1106 98/63 -- -- 99 26 91 %   12/02/23 1051 102/61 -- -- 102 29 93 %   12/02/23 1036 106/67 -- -- 104 24 97 %   12/02/23 1021 101/68 -- -- 99 22 95 %   12/02/23 1006 -- -- -- 105 (!) 56 93 %   12/02/23 0951 95/65 -- -- 111 -- 91 %   12/02/23 0936 99/63 -- -- 117 25 93 %   12/02/23 0918 -- -- -- -- -- (!) 89 %   12/02/23 0905 109/61 98.6  F (37  C) Oral (!) 122 (!) 36 (!) 86 %        Physical Exam  Nursing note and vitals reviewed.    Constitutional:  Appears comfortable with oxy mask on.  HENT:                Nose normal.  No discharge.      Oral mucosa is moist.  Eyes:    Conjunctivae are normal without injection.  Pupils are equal.  Cardiovascular:  Tachycardic, regular rhythm with normal S1 and S2.      Normal heart sounds and peripheral pulses 2+ and equal.       No murmur or nat.  Pulmonary:  Patient is talking in normal sentences, she has coarse rhonchi throughout bilaterally and slight increase in work of breathing and she is mildly tachypneic.  GI:    Soft. No distension and no mass. No tenderness.   Musculoskeletal:  Normal range of motion. No extremity deformity.     No edema and no tenderness.    Neurological:   Alert and oriented. No focal weakness.  Skin:    Skin is warm and dry. No rash noted.   Psychiatric:   Behavior is normal. Appropriate mood and affect.     Judgment and thought content normal.       Emergency Department Course       Imaging:  XR Chest 2 Views   Final Result   IMPRESSION: Heart size and vascularity are normal. Chronic right middle lobe and lingular scarring redemonstrated. Bilateral bronchial wall thickening with superimposed mid and lower lung predominant reticulonodular opacities. No pleural effusion.    Differential considerations would include atypical infection and aspiration.             Laboratory:  Labs Ordered and Resulted from Time of ED  Arrival to Time of ED Departure   BASIC METABOLIC PANEL - Abnormal       Result Value    Sodium 136      Potassium 4.2      Chloride 98      Carbon Dioxide (CO2) 24      Anion Gap 14      Urea Nitrogen 21.7      Creatinine 0.97 (*)     GFR Estimate 65      Calcium 9.3      Glucose 89     ISTAT GASES LACTATE VENOUS POCT - Abnormal    Lactic Acid POCT 1.1      Bicarbonate Venous POCT 23      O2 Sat, Venous POCT 74 (*)     pCO2 Venous POCT 35 (*)     pH Venous POCT 7.44 (*)     pO2 Venous POCT 38     CBC WITH PLATELETS AND DIFFERENTIAL - Abnormal    WBC Count 26.5 (*)     RBC Count 5.17      Hemoglobin 15.4      Hematocrit 46.1      MCV 89      MCH 29.8      MCHC 33.4      RDW 13.4      Platelet Count 223      % Neutrophils 93      % Lymphocytes 3      % Monocytes 2      % Eosinophils 1      % Basophils 0      % Immature Granulocytes 1      NRBCs per 100 WBC 0      Absolute Neutrophils 24.8 (*)     Absolute Lymphocytes 0.8      Absolute Monocytes 0.6      Absolute Eosinophils 0.1      Absolute Basophils 0.1      Absolute Immature Granulocytes 0.2      Absolute NRBCs 0.0     CRP INFLAMMATION - Abnormal    CRP Inflammation 205.72 (*)    INFLUENZA A/B, RSV, & SARS-COV2 PCR - Normal    Influenza A PCR Negative      Influenza B PCR Negative      RSV PCR Negative      SARS CoV2 PCR Negative     TROPONIN T, HIGH SENSITIVITY - Normal    Troponin T, High Sensitivity 9     NT PROBNP INPATIENT - Normal    N terminal Pro BNP Inpatient 177     PROCALCITONIN - Normal    Procalcitonin 0.33          Procedures   none    Emergency Department Course & Assessments:             Interventions:  Medications   sodium chloride 0.9 % infusion ( Intravenous Restarted 12/2/23 1311)   ipratropium - albuterol 0.5 mg/2.5 mg/3 mL (DUONEB) neb solution 3 mL (has no administration in time range)   piperacillin-tazobactam (ZOSYN) 4.5 g vial to attach to  mL bag (has no administration in time range)   doxycycline (VIBRAMYCIN) 100 mg vial to attach  to  mL bag (has no administration in time range)   methylPREDNISolone sodium succinate (solu-MEDROL) injection 62.5 mg (has no administration in time range)   ipratropium - albuterol 0.5 mg/2.5 mg/3 mL (DUONEB) neb solution 3 mL (3 mLs Nebulization $Given 12/2/23 1013)   methylPREDNISolone sodium succinate (solu-MEDROL) injection 125 mg (125 mg Intravenous $Given 12/2/23 1211)   sodium chloride 0.9% BOLUS 1,000 mL (0 mLs Intravenous Stopped 12/2/23 1311)   albuterol (PROVENTIL) neb solution 2.5 mg (2.5 mg Nebulization $Given 12/2/23 1310)        Assessments:  0935    Independent Interpretation (X-rays, CTs, rhythm strip):  Reviewed the chest x-ray and I can see the chronic bronchiectasis and possible infiltrate    Consultations/Discussion of Management or Tests:  I talked with Dr. Ponce the hospitalist [phone]       Social Determinants of Health affecting care:   None    Disposition:  The patient was admitted to the hospital under the care of Dr. Ponce.     Impression & Plan        Medical Decision Making:  Patient with a history of bronchiectasis comes in with worsening respiratory status despite recent steroids and Augmentin.  She was hypoxic at home and here required a oxy mask and we are keeping her sats in the low 90s.  Blood work is obtained and the basic panel is normal with a bicarb of 24, lactate is normal at 1.1 with a pH 7.44 and pCO2 of 35.  BNP and troponin are normal, white count is 26.5 but normal hemoglobin.  I believe that her white count is probably elevated due to the recent steroids.  She has not had fevers recently and states that she feels like there is phlegm in her chest but she cannot clear it.  I got a procalcitonin and it is normal and chest x-ray shows some bronchial wall thickening, there are some areas where there could be superimposed infiltrate over the scarring.  She was given Solu-Medrol 125 mg IV.  She was also given a DuoNeb which she said helped and a second albuterol neb was  given.  I discussed the case with Dr. Ponce and the patient will be admitted on oxygen, nebs, high-dose steroids, and we have decided to start antibiotics with Zosyn and doxycycline.  Pulmonary consultation can be carried out as well if needed.      Diagnosis:    ICD-10-CM    1. Acute respiratory failure with hypoxia (H)  J96.01       2. Bronchiectasis with acute exacerbation (H)  J47.1       3. Pulmonary infiltrate  R91.8     Bronchitis vs pneumonia           Discharge Medications:  New Prescriptions    No medications on file          Sarah Huggins MD  12/2/2023   Sarah Huggins MD Powell, Tracy Alan, MD  12/02/23 0351

## 2023-12-02 NOTE — ED NOTES
[facility]  ED Nurse Handoff Report    ED Chief complaint: Shortness of Breath      ED Diagnosis:   Final diagnoses:   None       Code Status: Refer to hsopitalist order    Allergies:   Allergies   Allergen Reactions    Mold        Patient Story: Patient has kitty having SOB for about a month and went to urgent care the day before thanksgiving and was given high dose steroids. Patient does have history of lung disease. Patient was feeling better after the steroids but has progressively had increasing SOB since the course and came into ED today for evaluation.  Focused Assessment:  Patient reports SOB is on 2L O2 oxymask for sats above 92%, reports also having significant dyspnea with exertion. Denies any chest pain, new or worsening confusion or other complaints at this time.    Treatments and/or interventions provided: Nebulizer treatment, labs, fluids, oxygen  Patient's response to treatments and/or interventions: improving SOB remains stable at this time    To be done/followed up on inpatient unit:  Continued workup for SOB    Does this patient have any cognitive concerns?:  NOne    Activity level - Baseline/Home:  Independent  Activity Level - Current:   Unknown    Patient's Preferred language: English   Needed?: No    Isolation: None  Infection: Not Applicable  Patient tested for COVID 19 prior to admission: YES  Bariatric?: No    Vital Signs:   Vitals:    12/02/23 0905   BP: 109/61   Pulse: (!) 122   Resp: (!) 36   Temp: 98.6  F (37  C)   TempSrc: Oral   SpO2: (!) 86%       Cardiac Rhythm:Cardiac Rhythm: Sinus tachycardia    Was the PSS-3 completed:   Yes  What interventions are required if any?               Family Comments: None  OBS brochure/video discussed/provided to patient/family: N/A              Name of person given brochure if not patient: NA              Relationship to patient: NA    For the majority of the shift this patient's behavior was Green.   Behavioral  interventions performed were None.    ED NURSE PHONE NUMBER: [phone]

## 2023-12-03 LAB
ANION GAP SERPL CALCULATED.3IONS-SCNC: 7 MMOL/L (ref 7–15)
BUN SERPL-MCNC: 18.5 MG/DL (ref 8–23)
CALCIUM SERPL-MCNC: 8.5 MG/DL (ref 8.8–10.2)
CHLORIDE SERPL-SCNC: 107 MMOL/L (ref 98–107)
CREAT SERPL-MCNC: 0.87 MG/DL (ref 0.51–0.95)
DEPRECATED HCO3 PLAS-SCNC: 24 MMOL/L (ref 22–29)
EGFRCR SERPLBLD CKD-EPI 2021: 74 ML/MIN/1.73M2
ERYTHROCYTE [DISTWIDTH] IN BLOOD BY AUTOMATED COUNT: 13.3 % (ref 10–15)
GLUCOSE BLDC GLUCOMTR-MCNC: 134 MG/DL (ref 70–99)
GLUCOSE BLDC GLUCOMTR-MCNC: 135 MG/DL (ref 70–99)
GLUCOSE BLDC GLUCOMTR-MCNC: 147 MG/DL (ref 70–99)
GLUCOSE BLDC GLUCOMTR-MCNC: 184 MG/DL (ref 70–99)
GLUCOSE BLDC GLUCOMTR-MCNC: 213 MG/DL (ref 70–99)
GLUCOSE SERPL-MCNC: 144 MG/DL (ref 70–99)
HCT VFR BLD AUTO: 36 % (ref 35–47)
HGB BLD-MCNC: 11.9 G/DL (ref 11.7–15.7)
MCH RBC QN AUTO: 29.5 PG (ref 26.5–33)
MCHC RBC AUTO-ENTMCNC: 33.1 G/DL (ref 31.5–36.5)
MCV RBC AUTO: 89 FL (ref 78–100)
PLATELET # BLD AUTO: 161 10E3/UL (ref 150–450)
POTASSIUM SERPL-SCNC: 4.1 MMOL/L (ref 3.4–5.3)
RBC # BLD AUTO: 4.04 10E6/UL (ref 3.8–5.2)
SODIUM SERPL-SCNC: 138 MMOL/L (ref 135–145)
WBC # BLD AUTO: 12.2 10E3/UL (ref 4–11)

## 2023-12-03 PROCEDURE — 120N000001 HC R&B MED SURG/OB

## 2023-12-03 PROCEDURE — 94640 AIRWAY INHALATION TREATMENT: CPT | Mod: 76

## 2023-12-03 PROCEDURE — 250N000009 HC RX 250: Performed by: INTERNAL MEDICINE

## 2023-12-03 PROCEDURE — 94640 AIRWAY INHALATION TREATMENT: CPT

## 2023-12-03 PROCEDURE — 36415 COLL VENOUS BLD VENIPUNCTURE: CPT | Performed by: INTERNAL MEDICINE

## 2023-12-03 PROCEDURE — 99233 SBSQ HOSP IP/OBS HIGH 50: CPT | Performed by: STUDENT IN AN ORGANIZED HEALTH CARE EDUCATION/TRAINING PROGRAM

## 2023-12-03 PROCEDURE — 80048 BASIC METABOLIC PNL TOTAL CA: CPT | Performed by: INTERNAL MEDICINE

## 2023-12-03 PROCEDURE — 250N000013 HC RX MED GY IP 250 OP 250 PS 637: Performed by: INTERNAL MEDICINE

## 2023-12-03 PROCEDURE — 85027 COMPLETE CBC AUTOMATED: CPT | Performed by: INTERNAL MEDICINE

## 2023-12-03 PROCEDURE — 250N000011 HC RX IP 250 OP 636: Mod: JZ | Performed by: INTERNAL MEDICINE

## 2023-12-03 PROCEDURE — 999N000157 HC STATISTIC RCP TIME EA 10 MIN

## 2023-12-03 RX ORDER — BUDESONIDE 0.5 MG/2ML
0.5 INHALANT ORAL 2 TIMES DAILY
Status: DISCONTINUED | OUTPATIENT
Start: 2023-12-03 | End: 2023-12-05 | Stop reason: HOSPADM

## 2023-12-03 RX ORDER — DOXYCYCLINE 100 MG/1
100 CAPSULE ORAL EVERY 12 HOURS SCHEDULED
Status: DISCONTINUED | OUTPATIENT
Start: 2023-12-03 | End: 2023-12-03

## 2023-12-03 RX ORDER — DOXYCYCLINE 100 MG/1
100 CAPSULE ORAL EVERY 12 HOURS SCHEDULED
Status: DISCONTINUED | OUTPATIENT
Start: 2023-12-03 | End: 2023-12-05 | Stop reason: HOSPADM

## 2023-12-03 RX ADMIN — PIPERACILLIN AND TAZOBACTAM 3.38 G: 3; .375 INJECTION, POWDER, FOR SOLUTION INTRAVENOUS at 00:42

## 2023-12-03 RX ADMIN — ACETYLCYSTEINE 4 ML: 200 SOLUTION ORAL; RESPIRATORY (INHALATION) at 11:29

## 2023-12-03 RX ADMIN — IPRATROPIUM BROMIDE AND ALBUTEROL SULFATE 3 ML: .5; 3 SOLUTION RESPIRATORY (INHALATION) at 15:42

## 2023-12-03 RX ADMIN — ALBUTEROL SULFATE 2.5 MG: 2.5 SOLUTION RESPIRATORY (INHALATION) at 03:48

## 2023-12-03 RX ADMIN — ACETYLCYSTEINE 4 ML: 200 SOLUTION ORAL; RESPIRATORY (INHALATION) at 11:24

## 2023-12-03 RX ADMIN — HYDROXYCHLOROQUINE SULFATE 200 MG: 200 TABLET ORAL at 22:39

## 2023-12-03 RX ADMIN — PIPERACILLIN AND TAZOBACTAM 3.38 G: 3; .375 INJECTION, POWDER, FOR SOLUTION INTRAVENOUS at 19:35

## 2023-12-03 RX ADMIN — GUAIFENESIN 1200 MG: 600 TABLET, EXTENDED RELEASE ORAL at 19:34

## 2023-12-03 RX ADMIN — IPRATROPIUM BROMIDE AND ALBUTEROL SULFATE 3 ML: .5; 3 SOLUTION RESPIRATORY (INHALATION) at 08:09

## 2023-12-03 RX ADMIN — BUDESONIDE 0.5 MG: 0.5 INHALANT RESPIRATORY (INHALATION) at 19:02

## 2023-12-03 RX ADMIN — ACETYLCYSTEINE 4 ML: 200 SOLUTION ORAL; RESPIRATORY (INHALATION) at 08:03

## 2023-12-03 RX ADMIN — IPRATROPIUM BROMIDE AND ALBUTEROL SULFATE 3 ML: .5; 3 SOLUTION RESPIRATORY (INHALATION) at 19:02

## 2023-12-03 RX ADMIN — ACETYLCYSTEINE 4 ML: 200 SOLUTION ORAL; RESPIRATORY (INHALATION) at 03:48

## 2023-12-03 RX ADMIN — BUDESONIDE 0.5 MG: 0.5 INHALANT RESPIRATORY (INHALATION) at 11:31

## 2023-12-03 RX ADMIN — METHYLPREDNISOLONE SODIUM SUCCINATE 62.5 MG: 125 INJECTION INTRAMUSCULAR; INTRAVENOUS at 05:49

## 2023-12-03 RX ADMIN — INSULIN ASPART 2 UNITS: 100 INJECTION, SOLUTION INTRAVENOUS; SUBCUTANEOUS at 18:11

## 2023-12-03 RX ADMIN — METHYLPREDNISOLONE SODIUM SUCCINATE 62.5 MG: 125 INJECTION INTRAMUSCULAR; INTRAVENOUS at 12:18

## 2023-12-03 RX ADMIN — ZINC SULFATE 220 MG (50 MG) CAPSULE 220 MG: CAPSULE at 08:12

## 2023-12-03 RX ADMIN — CETIRIZINE HYDROCHLORIDE 10 MG: 10 TABLET, FILM COATED ORAL at 08:12

## 2023-12-03 RX ADMIN — PIPERACILLIN AND TAZOBACTAM 3.38 G: 3; .375 INJECTION, POWDER, FOR SOLUTION INTRAVENOUS at 12:29

## 2023-12-03 RX ADMIN — UMECLIDINIUM 1 PUFF: 62.5 AEROSOL, POWDER ORAL at 08:12

## 2023-12-03 RX ADMIN — ACETYLCYSTEINE 4 ML: 200 SOLUTION ORAL; RESPIRATORY (INHALATION) at 15:42

## 2023-12-03 RX ADMIN — PANTOPRAZOLE SODIUM 40 MG: 40 TABLET, DELAYED RELEASE ORAL at 06:37

## 2023-12-03 RX ADMIN — DOXYCYCLINE HYCLATE 100 MG: 100 CAPSULE ORAL at 19:34

## 2023-12-03 RX ADMIN — PIPERACILLIN AND TAZOBACTAM 3.38 G: 3; .375 INJECTION, POWDER, FOR SOLUTION INTRAVENOUS at 06:37

## 2023-12-03 RX ADMIN — IPRATROPIUM BROMIDE AND ALBUTEROL SULFATE 3 ML: .5; 3 SOLUTION RESPIRATORY (INHALATION) at 11:34

## 2023-12-03 RX ADMIN — Medication 25 MCG: at 08:12

## 2023-12-03 RX ADMIN — ACETYLCYSTEINE 4 ML: 200 SOLUTION ORAL; RESPIRATORY (INHALATION) at 19:02

## 2023-12-03 RX ADMIN — METHYLPREDNISOLONE SODIUM SUCCINATE 62.5 MG: 125 INJECTION INTRAMUSCULAR; INTRAVENOUS at 00:42

## 2023-12-03 RX ADMIN — DOXYCYCLINE HYCLATE 100 MG: 20 TABLET ORAL at 08:12

## 2023-12-03 RX ADMIN — METHYLPREDNISOLONE SODIUM SUCCINATE 62.5 MG: 125 INJECTION INTRAMUSCULAR; INTRAVENOUS at 18:11

## 2023-12-03 RX ADMIN — FLUTICASONE FUROATE AND VILANTEROL TRIFENATATE 1 PUFF: 200; 25 POWDER RESPIRATORY (INHALATION) at 08:12

## 2023-12-03 RX ADMIN — GUAIFENESIN 1200 MG: 600 TABLET, EXTENDED RELEASE ORAL at 08:12

## 2023-12-03 ASSESSMENT — ACTIVITIES OF DAILY LIVING (ADL)
ADLS_ACUITY_SCORE: 22

## 2023-12-03 NOTE — PLAN OF CARE
Goal Outcome Evaluation:         Summary: Pneumonia/bronchiectasis  DATE & TIME: 12/3/23 [id number]    Cognitive Concerns/ Orientation : A&OX4   BEHAVIOR & AGGRESSION TOOL COLOR: Green  CIWA SCORE: NA   ABNL VS/O2: Vss, on 3 liters 92%  MOBILITY: Independent  PAIN MANAGMENT: Denied  DIET: Regular  BOWEL/BLADDER: Continent  DRAIN/DEVICES: NA  TELEMETRY RHYTHM: NSR  SKIN: WNL  TESTS/PROCEDURES: NA  D/C DAY/GOALS/PLACE: Pending  OTHER IMPORTANT INFO: History of asthma, bilateral hearing aids.

## 2023-12-03 NOTE — CONSULTS
"PULMONOLOGY CONSULTATION  Date of service: 12/3/2023    [facility]  _____________________________________________________    Aylin Harding  63 year old female  [id number]  [address] [place] [address]    Primary Care Provider:  Erick Springer  Admission Date: 12/2/2023  Hospital Attending Physician:  Peggy Ponce MD  ________________________________________    CHIEF COMPLAINT : I was asked to see this patient by Peggy Ponce MD for evaluation of bronchiectasis.     Informant: EHR and patient    HISTORY OF PRESENT ILLNESS     Per admission H&P:    \"Aylin Harding is a 63 year old female with  history of bronchiectasis with history of pneumonias, history of asthma presents to the hospital with worsening shortness of breath, wheezing and cough since October.  At end of October patient started having cough with wheezing and shortness of breath since then she has been doing nebs 4 times a day.  It got to the point where it was getting worse, so she went and saw a physician in the clinic the day before Thanksgiving and she was given a prednisone taper starting with 60 mg coming down by 20 mg every 3days.  She was also given a prescription for oral Augmentin for 1 week which she took and completed the course yesterday.  Yesterday she went to her daughter's house felt cold there and then got home and was coughing all night with worsening shortness of breath and wheezing and was noted to be hypoxic to 83% on room air at home which made her come into the emergency room. She was evaluated by Dr. Sarah Huggins in the emergency room.  He was afebrile temperature 98.6  F was tachycardic with heart rate of 122 in the ED blood pressure of 109/61 was tachypneic with respirate of 36 and she was satting 96% on room air.  She was given 1 dose of 125 mg of Solu-Medrol for acute asthma exacerbation with bronchiectasis and albuterol nebs given.  She was placed on OxyMask to help " "with hypoxia.  1 L normal saline bolus was given.  Lab work in the ED showed WC count elevated at 26.5 hemoglobin 15.4 platelet count at 223  VBG showed hypoxia with a PO2 at 74%, pH at 7.44 PCO2 low at 35 consistent with mild respiratory alkalosis with hypoxia seen.  Lactate was normal at 1.1     BMP was normal with sodium at 136 potassium 4.2 chloride 98 bicarb 24 anion gap of 14 BUN 21.7 creatinine 0.97.  Blood glucose normal at 89  CRP elevated 205.72  BNP normal at 177  Procalcitonin normal at 0.33  Chest x-ray done showed heart size and vascularity are normal.  Chronic right middle lobe and lingular scarring redemonstrated.  Bilateral bronchial wall thickening with superimposed mid and lower lung predominant reticulonodular opacities.  No pleural effusion.  Differential consideration would include atypical infection and aspiration     Request to hospitalization was made due to acute hypoxic respiratory failure with acute asthma exacerbation and community-acquired pneumonia\"      HOME MEDICATIONS     Medications Prior to Admission   Medication Sig Dispense Refill Last Dose     albuterol (PROAIR HFA/PROVENTIL HFA/VENTOLIN HFA) 108 (90 Base) MCG/ACT inhaler Inhale 2 puffs into the lungs every 6 hours 18 g 3 prn at prn     cetirizine (ZYRTEC) 10 MG tablet Take 10 mg by mouth daily   12/1/2023     Fluticasone-Umeclidin-Vilanterol (TRELEGY ELLIPTA) 200-62.5-25 MCG/ACT oral inhaler Inhale 1 puff into the lungs daily   12/1/2023 at pm     hydroxychloroquine (PLAQUENIL) 200 MG tablet Take 1 tablet (200 mg) by mouth daily 90 tablet 3 12/1/2023 at hs     Vitamin D3 (VITAMIN D, CHOLECALCIFEROL,) 25 mcg (1000 units) tablet Take 1 tablet by mouth daily   12/1/2023     zinc sulfate (ZINCATE) 220 (50 Zn) MG capsule Take 220 mg by mouth daily   12/1/2023       PAST MEDICAL HISTORY      Past Medical History:   Diagnosis Date     Asthma      Bronchiectasis 9/5/08     Hormone replacement therapy 2011     Osteoporosis 2001    " followed by PCP (Dr. Springer)     Pneumonia, organism unspecified(486)      Uncomplicated asthma        PAST SURGICAL HISTORY      Past Surgical History:   Procedure Laterality Date     CARPAL TUNNEL RELEASE RT/LT  2009     ZZC ORAL SURGERY PROCEDURE         ALLERGIES     Allergies   Allergen Reactions     Mold        SOCIAL / SUBSTANCE HISTORY     Social History     Socioeconomic History     Marital status:      Spouse name: kerri     Number of children: 2     Years of education: 14     Highest education level: Not on file   Occupational History     Occupation: manager     Employer: [employer]   Tobacco Use     Smoking status: Never     Smokeless tobacco: Never   Vaping Use     Vaping Use: Never used   Substance and Sexual Activity     Alcohol use: Yes     Alcohol/week: 0.0 standard drinks of alcohol     Comment: rare socially     Drug use: No     Sexual activity: Yes     Partners: Male     Birth control/protection: Post-menopausal   Other Topics Concern     Parent/sibling w/ CABG, MI or angioplasty before 65F 55M? Not Asked   Social History Narrative     Not on file     Social Determinants of Health     Financial Resource Strain: Not on file   Food Insecurity: Not on file   Transportation Needs: Not on file   Physical Activity: Not on file   Stress: Not on file   Social Connections: Not on file   Interpersonal Safety: Not on file   Housing Stability: Not on file       FAMILY HISTORY     Family History   Problem Relation Age of Onset     Arthritis Mother         b:1937     Cancer Mother      Arthritis Father         b:1934     Family History Negative Sister         2 sisters b:1958,1962     Family History Negative Brother         2 brothers b:1960,1963     No Known Problems Maternal Grandmother      No Known Problems Maternal Grandfather      No Known Problems Paternal Grandmother      No Known Problems Other      No Known Problems Brother      No Known Problems Sister        REVIEW OF SYSTEMS   A  "comprehensive review of systems was negative except for items noted in HPI/Subjective.    PHYSICAL EXAMINATION   Vital signs  Temp (24hrs), Av.3  F (36.8  C), Min:97.8  F (36.6  C), Max:98.7  F (37.1  C)    Temp: 98.7  F (37.1  C) Temp src: Oral BP: 96/54 Pulse: 73   Resp: 18 SpO2: 97 % O2 Device: Nasal cannula Oxygen Delivery: 4 LPM      Estimated body mass index is 23.78 kg/m  as calculated from the following:    Height as of 23: 1.753 m (5' 9\").    Weight as of 23: 73 kg (161 lb).  I/O last 3 completed shifts:  In: 240 [P.O.:240]  Out: -     CONSTITUTIONAL/GENERAL: Alert female. No apparent distress.  EARS,NOSE,THROAT,MOUTH: Trachea midline. NC in place.   RESPIRATORY: Diffuse rhonchi and wheezing with inspiratory squeaks.  CARDIOVASCULAR: RRR, S1, S2.   PSYCHIATRIC: Appropriate mood and affect.     LABORATORY ASSESSMENT    Arterial Blood GasNo lab results found in last 7 days.  CBC  Recent Labs   Lab 23  0756 23  0934   WBC 12.2* 26.5*   RBC 4.04 5.17   HGB 11.9 15.4   HCT 36.0 46.1   MCV 89 89   MCH 29.5 29.8   MCHC 33.1 33.4   RDW 13.3 13.4    223     BMP  Recent Labs   Lab 23  0756 23  0730 23  0202 23  2127 23  1759 23  0934     --   --   --   --  136   POTASSIUM 4.1  --   --   --   --  4.2   CHLORIDE 107  --   --   --   --  98   BJORN 8.5*  --   --   --   --  9.3   CO2 24  --   --   --   --  24   BUN 18.5  --   --   --   --  21.7   CR 0.87  --   --   --   --  0.97*   * 135* 147* 178*   < > 89    < > = values in this interval not displayed.     INRNo lab results found in last 7 days.   BNPNo lab results found in last 7 days.  VENOUS BLOOD GASES  Recent Labs   Lab 23  0932   PHV 7.44*   PCO2V 35*   PO2V 38   HCO3V 23       IMAGING, ECHO, PFT, SLEEP STUDY, RHC, OTHER TESTING         ASSESSMENT / PLAN      Pulmonary diagnoses (alphabetical):  Abnormal Chest Imaging R91.8  Asthma unspecified J45.909  Asthma w/ Exacerbation " J45.901  Bronchiectasis J47.9  Cough R05  Hypoxemia R09.02  Pneumonia CAP / Unspecified J18.9  Respiratory Failure Acute J96.00  Shortness of Breath R06.02      ASSESSMENT:  63-year-old female with severe bronchiectasis, prior PNA, asthma, presenting with dyspnea, cough, and wheezing.    Follows [facility] and underwent bronchoscopy in 2017 with BAL growing Aspergillus.  Last seen 9/2023 by Dr. Hope and was maintained on Trelegy and albuterol, and given Rx for prednisone and doxycycline to use as needed.    Then developed progressive dyspnea and cough in October.  Seen at urgent care and received prednisone 60 mg, tapering by 20 mg every 3 days, as well as 1 week of Augmentin.  Initially improved but developed worsening dyspnea around Thanksgiving. On admission initially required oxymask, now on 4 L O2.  Labs showed leukocytosis with markedly elevated CRP at 205, normal procalcitonin. Sputum cultures pending collection. CT chest shows diffuse tree-in-bud opacities with bronchovascular consolidation and bronchiectasis most notably in the RML and lingula.    Presentation is consistent with bronchiectasis and/or asthma flare, possibly infectious trigger.  Currently receiving DuoNeb, Mucomyst neb,  Breo, Incruse, Solu-Medrol 62.5 mg every 6 hours, Zosyn, doxycycline, and Mucinex.  Diffuse wheezing and rhonchi on exam.      PLAN:   Check respiratory viral panel. Ordered.   Stop Breo and Incruse. Order removed.   Start budesonide neb 0.5 mg every 12 hours. Ordered.   Continue DuoNeb MetaNeb followed by Mucomyst MetaNeb every 4 hours.  Aerobika after nebs.   Continue Solu-Medrol 62.5 mg every 6 hours.  Continue broad-spectrum antibiotics.  Continue Mucinex 1200 mg twice daily.  Please collect sputum cultures. Previously ordered.   Incentive spirometry 10 times/hour while awake.  Encourage OOB.        Martínez Castanon MD    [facility] / [facility]  Office: [phone]  Pager:  [phone]

## 2023-12-03 NOTE — PLAN OF CARE
Goal Outcome Evaluation:  Summary: Pneumonia/bronchiectasis  DATE & TIME: 12/2/23 [id number]    Cognitive Concerns/ Orientation : A&OX4   BEHAVIOR & AGGRESSION TOOL COLOR: Green  CIWA SCORE: NA   ABNL VS/O2: Vss, on 3 liters 93%  MOBILITY: Independent  PAIN MANAGMENT: Denied  DIET: Regular  BOWEL/BLADDER: Continent  ABNL LAB/BG: .72 WBC 26.5    DRAIN/DEVICES: NA  TELEMETRY RHYTHM: NSR  SKIN: WNL  TESTS/PROCEDURES: NA  D/C DAY/GOALS/PLACE: Pending  OTHER IMPORTANT INFO: History of asthma, bilateral hearing aids.

## 2023-12-03 NOTE — PLAN OF CARE
Goal Outcome Evaluation:  Summary: Pneumonia/bronchiectasis  DATE & TIME: 12/3/23  [id number]    Cognitive Concerns/ Orientation : A&OX4   BEHAVIOR & AGGRESSION TOOL COLOR: Green  CIWA SCORE: NA   ABNL VS/O2: Vss, on 3 liters 92%, trying to wean oxygen  MOBILITY: Independent  PAIN MANAGMENT: Denied  DIET: Regular  BOWEL/BLADDER: Continent  DRAIN/DEVICES: NA  TELEMETRY RHYTHM: NSR  SKIN: WNL  TESTS/PROCEDURES: BGM's 135/134  D/C DAY/GOALS/PLACE: 1-2 days  OTHER IMPORTANT INFO: Lungs coarse/on IV solumedrol/IV antibiotics. History of asthma, bilateral hearing aids.

## 2023-12-03 NOTE — PROGRESS NOTES
[facility]  Hospitalist Progress Note    Assessment & Plan     Aylin Harding is a 63 year old female admitted on 12/2/2023. She has a history of bronchiectasis, asthma, and pneumonia. She presents to the hospital with worsening shortness of breath, wheezing and cough since October.  At end of October patient started having cough with wheezing and shortness of breath since then she has been doing nebs 4 times a day.  It got to the point where it was getting worse, so she went and saw a physician in the clinic the day before Thanksgiving and she was given a prednisone taper starting with 60 mg coming down by 20 mg every 3days.  She was also given a prescription for oral Augmentin for 1 week which she took and completed the course 12/1. That night had worsening shortness of breath and decided to come to the ER. Chest x-ray showed bilateral reticulonodular opacities consistent with atypical pneumonia.  Patient was tachycardic tachypneic on arrival to the ED with respiratory rate in the high 30s and was satting 86% on room air needing oxygen by facemask.  She remains on O2 12/3 but overall reports great improvement of dyspnea. Pulmonology consulted, plan as below.      Acute hypoxic respiratory failure secondary to acute asthma exacerbation  History of bronchiectasis  Possible atypical pneumonia;  Patient has been having worsening shortness of breath and cough and wheezing since end of October.  Was prescribed prednisone taper and course of Augmentin starting on the day before Thanksgiving. Was down to 20 mg of prednisone daily before coming to the ER for worsening dyspnea. While on high-dose steroids she felt better but recently she started feeling worse again so she came to ED. Found to have atypical pneumonia on imaging. Admitted to inpatient. Currently on 3-4 lpm nasal cannula, but reporting significant improvement of dyspnea  - Pulmonology consulted, recommendations appreciated   -  Respiratory viral panel   - Stop breo and Incruse   - Budesonide neb 0.5 mg q12h   - Continue duoneb followed by mucomyst metaneb q4h   - Aerobika after nebs   - Continue Solu-Medrol 62.5 mg every 6 hours   - Continue broad-spectrum antibiotics (zosyn and doxycycline)   - Continue Mucinex 1200 mg BID   - Sputum culture   - incentive spirometry  - blood sugar checks 4 times daily and sliding scale insulin while on IV steroids  - protonix while on steroids      History of seasonal allergies;  Continue Zyrtec    Clinically Significant Risk Factors                             # Asthma: noted on problem list          Diet: Combination Diet Regular Diet Adult     DVT Prophylaxis:PCD  Springer Catheter: Not present  Lines: None     Cardiac Monitoring: ACTIVE order. Indication: Tachyarrhythmias, acute (48 hours)  Code Status: Full Code      Disposition Plan      Expected Discharge Date: 12/04/2023              Entered: Mark Solis DO 12/03/2023, 1:44 PM           Mark Solis DO  [facility]  Securely message with the Vocera Web Console (learn more here)  Text page via [email or web address] Paging/Directory    Interval History     - Patient reports significant improvement of her dyspnea   - No nausea, vomiting, chest pain, or any other new symptoms  - No other acute concerns at this time     -Data reviewed today: I reviewed all new labs and imaging results over the last 24 hours.     Physical Exam   Temp: 98.4  F (36.9  C) Temp src: Oral BP: 97/57 Pulse: 89   Resp: 18 SpO2: 99 % O2 Device: Nasal cannula Oxygen Delivery: 4 LPM  There were no vitals filed for this visit.  Vital Signs with Ranges  Temp:  [97.8  F (36.6  C)-98.7  F (37.1  C)] 98.4  F (36.9  C)  Pulse:  [73-93] 89  Resp:  [18-30] 18  BP: ()/(54-78) 97/57  SpO2:  [91 %-99 %] 99 %  I/O last 3 completed shifts:  In: 240 [P.O.:240]  Out: -     Constitutional: Awake, alert, cooperative, no apparent distress.    ENT: Normocephalic, without  obvious abnormality, atraumatic, oral pharynx with moist mucus membranes, tonsils without erythema or exudates.  Neck: Supple, symmetrical, trachea midline, no adenopathy.  Pulmonary: Normal effort, diffuse wheezing and rhonchi   Cardiovascular: Regular rate and rhythm, normal S1 and S2, no S3 or S4, and no murmur noted.  GI: Normal bowel sounds, soft, non-distended, non-tender.  Skin/Integumen: Visualized skin appeared clear.  Neuro: CN II-XII grossly intact.  Psych:  Alert and oriented x 3. Normal affect.  Extremities: No lower extremity edema noted, and calves are non-TTP bilaterally.     Medical Decision Making       50 MINUTES SPENT BY ME on the date of service doing chart review, history, exam, documentation & further activities per the note.         Medications      acetylcysteine  4 mL Nebulization Q4H    budesonide  0.5 mg Nebulization BID    cetirizine  10 mg Oral Daily    doxycycline hyclate  100 mg Oral Q12H [facility] (08/20)    guaiFENesin  1,200 mg Oral BID    hydroxychloroquine  200 mg Oral Daily    insulin aspart  1-7 Units Subcutaneous TID AC    insulin aspart  1-5 Units Subcutaneous At Bedtime    ipratropium - albuterol 0.5 mg/2.5 mg/3 mL  3 mL Nebulization Q4H WA    methylPREDNISolone  62.5 mg Intravenous Q6H    pantoprazole  40 mg Oral QAM AC    piperacillin-tazobactam  3.375 g Intravenous Q6H    sodium chloride (PF)  3 mL Intracatheter Q8H    Vitamin D3  25 mcg Oral Daily    zinc sulfate  220 mg Oral Daily     Data   Recent Labs   Lab 12/03/23  1218 12/03/23  0756 12/03/23  0730 12/02/23  1759 12/02/23  0934   WBC  --  12.2*  --   --  26.5*   HGB  --  11.9  --   --  15.4   MCV  --  89  --   --  89   PLT  --  161  --   --  223   NA  --  138  --   --  136   POTASSIUM  --  4.1  --   --  4.2   CHLORIDE  --  107  --   --  98   CO2  --  24  --   --  24   BUN  --  18.5  --   --  21.7   CR  --  0.87  --   --  0.97*   ANIONGAP  --  7  --   --  14   BJORN  --  8.5*  --   --  9.3   * 144* 135*   < > 89    <  > = values in this interval not displayed.     Recent Results (from the past 24 hour(s))   CT Chest w Contrast    Narrative    EXAM: CT CHEST W CONTRAST  LOCATION: [facility]  DATE: 12/2/2023    INDICATION: worsening SOB and Hypoxia with h o Bronchiectasis  COMPARISON: CT chest 12/21/2017  TECHNIQUE: CT chest with IV contrast. Multiplanar reformats were obtained. Dose reduction techniques were used.    CONTRAST: 81mL Isovue 370    FINDINGS:   LUNGS AND PLEURA: Diffuse tree-in-bud opacities and bronchovascular consolidation in the right and left lower lobes, right middle lobe, and to a lesser degree the upper lobes. Similar extensive paramedian bronchiectasis in the right upper lobe, right   middle lobe, lingula, and bilateral lower lobes with extensive airway thickening. 7 mm nodule in the peripheral right lower lobe (4/256) as well as adjacent smaller nodules (4/237) are minimally increased in size compared to 12/21/2017, suggesting   benignity.    MEDIASTINUM/AXILLAE: Normal heart size. No pericardial effusion. Thoracic aorta is nonaneurysmal. Scattered prominent mediastinal and hilar lymph nodes are minimally increased compared to 12/21/2017 and likely reactive. Patulous/air and fluid-filled   esophagus with medium-sized hiatal hernia, suggesting underlying esophageal dysmotility.    CORONARY ARTERY CALCIFICATION: None.    UPPER ABDOMEN: No significant finding.    MUSCULOSKELETAL: No aggressive osseous lesions.      Impression    IMPRESSION:   1.  Diffuse tree-in-bud opacities and bronchovascular consolidation in the bilateral lower lobes, right middle lobe, lingula, and to a lesser degree the upper lobes, suspicious for infection (including atypical infection).   2.  Patulous air/fluid-filled esophagus with diffuse bronchial wall thickening, suggesting a component of aspiration.  3.  Similar paramedian and lower lobe bronchiectasis compared to 12/21/2017.     Mark Solis,

## 2023-12-04 ENCOUNTER — APPOINTMENT (OUTPATIENT)
Dept: PHYSICAL THERAPY | Facility: CLINIC | Age: 63
DRG: 193 | End: 2023-12-04
Attending: STUDENT IN AN ORGANIZED HEALTH CARE EDUCATION/TRAINING PROGRAM
Payer: COMMERCIAL

## 2023-12-04 LAB
GLUCOSE BLDC GLUCOMTR-MCNC: 105 MG/DL (ref 70–99)
GLUCOSE BLDC GLUCOMTR-MCNC: 146 MG/DL (ref 70–99)
GLUCOSE BLDC GLUCOMTR-MCNC: 146 MG/DL (ref 70–99)
GLUCOSE BLDC GLUCOMTR-MCNC: 169 MG/DL (ref 70–99)
GLUCOSE BLDC GLUCOMTR-MCNC: 192 MG/DL (ref 70–99)

## 2023-12-04 PROCEDURE — 250N000009 HC RX 250: Performed by: INTERNAL MEDICINE

## 2023-12-04 PROCEDURE — 97161 PT EVAL LOW COMPLEX 20 MIN: CPT | Mod: GP

## 2023-12-04 PROCEDURE — 999N000157 HC STATISTIC RCP TIME EA 10 MIN

## 2023-12-04 PROCEDURE — 250N000011 HC RX IP 250 OP 636: Mod: JZ | Performed by: INTERNAL MEDICINE

## 2023-12-04 PROCEDURE — 99232 SBSQ HOSP IP/OBS MODERATE 35: CPT | Performed by: STUDENT IN AN ORGANIZED HEALTH CARE EDUCATION/TRAINING PROGRAM

## 2023-12-04 PROCEDURE — 250N000013 HC RX MED GY IP 250 OP 250 PS 637: Performed by: INTERNAL MEDICINE

## 2023-12-04 PROCEDURE — 94640 AIRWAY INHALATION TREATMENT: CPT | Mod: 76

## 2023-12-04 PROCEDURE — 94640 AIRWAY INHALATION TREATMENT: CPT

## 2023-12-04 PROCEDURE — 250N000012 HC RX MED GY IP 250 OP 636 PS 637: Performed by: INTERNAL MEDICINE

## 2023-12-04 PROCEDURE — 120N000001 HC R&B MED SURG/OB

## 2023-12-04 RX ORDER — PREDNISONE 20 MG/1
60 TABLET ORAL DAILY
Status: DISCONTINUED | OUTPATIENT
Start: 2023-12-04 | End: 2023-12-05

## 2023-12-04 RX ADMIN — ACETYLCYSTEINE 4 ML: 200 SOLUTION ORAL; RESPIRATORY (INHALATION) at 07:21

## 2023-12-04 RX ADMIN — ACETYLCYSTEINE 4 ML: 200 SOLUTION ORAL; RESPIRATORY (INHALATION) at 15:23

## 2023-12-04 RX ADMIN — PREDNISONE 60 MG: 20 TABLET ORAL at 12:28

## 2023-12-04 RX ADMIN — PIPERACILLIN AND TAZOBACTAM 3.38 G: 3; .375 INJECTION, POWDER, FOR SOLUTION INTRAVENOUS at 00:53

## 2023-12-04 RX ADMIN — INSULIN ASPART 2 UNITS: 100 INJECTION, SOLUTION INTRAVENOUS; SUBCUTANEOUS at 09:35

## 2023-12-04 RX ADMIN — ZINC SULFATE 220 MG (50 MG) CAPSULE 220 MG: CAPSULE at 09:28

## 2023-12-04 RX ADMIN — GUAIFENESIN 1200 MG: 600 TABLET, EXTENDED RELEASE ORAL at 09:28

## 2023-12-04 RX ADMIN — ACETYLCYSTEINE 4 ML: 200 SOLUTION ORAL; RESPIRATORY (INHALATION) at 01:16

## 2023-12-04 RX ADMIN — PIPERACILLIN AND TAZOBACTAM 3.38 G: 3; .375 INJECTION, POWDER, FOR SOLUTION INTRAVENOUS at 13:15

## 2023-12-04 RX ADMIN — DOXYCYCLINE HYCLATE 100 MG: 100 CAPSULE ORAL at 09:28

## 2023-12-04 RX ADMIN — ACETYLCYSTEINE 4 ML: 200 SOLUTION ORAL; RESPIRATORY (INHALATION) at 11:54

## 2023-12-04 RX ADMIN — IPRATROPIUM BROMIDE AND ALBUTEROL SULFATE 3 ML: .5; 3 SOLUTION RESPIRATORY (INHALATION) at 19:13

## 2023-12-04 RX ADMIN — DOCUSATE SODIUM 50 MG AND SENNOSIDES 8.6 MG 2 TABLET: 8.6; 5 TABLET, FILM COATED ORAL at 21:23

## 2023-12-04 RX ADMIN — IPRATROPIUM BROMIDE AND ALBUTEROL SULFATE 3 ML: .5; 3 SOLUTION RESPIRATORY (INHALATION) at 07:21

## 2023-12-04 RX ADMIN — Medication 25 MCG: at 09:28

## 2023-12-04 RX ADMIN — ALBUTEROL SULFATE 2.5 MG: 2.5 SOLUTION RESPIRATORY (INHALATION) at 01:17

## 2023-12-04 RX ADMIN — IPRATROPIUM BROMIDE AND ALBUTEROL SULFATE 3 ML: .5; 3 SOLUTION RESPIRATORY (INHALATION) at 11:54

## 2023-12-04 RX ADMIN — BUDESONIDE 0.5 MG: 0.5 INHALANT RESPIRATORY (INHALATION) at 19:13

## 2023-12-04 RX ADMIN — PIPERACILLIN AND TAZOBACTAM 3.38 G: 3; .375 INJECTION, POWDER, FOR SOLUTION INTRAVENOUS at 07:04

## 2023-12-04 RX ADMIN — INSULIN ASPART 1 UNITS: 100 INJECTION, SOLUTION INTRAVENOUS; SUBCUTANEOUS at 18:39

## 2023-12-04 RX ADMIN — HYDROXYCHLOROQUINE SULFATE 200 MG: 200 TABLET ORAL at 21:19

## 2023-12-04 RX ADMIN — ACETYLCYSTEINE 4 ML: 200 SOLUTION ORAL; RESPIRATORY (INHALATION) at 19:13

## 2023-12-04 RX ADMIN — ALBUTEROL SULFATE 2.5 MG: 2.5 SOLUTION RESPIRATORY (INHALATION) at 23:01

## 2023-12-04 RX ADMIN — CETIRIZINE HYDROCHLORIDE 10 MG: 10 TABLET, FILM COATED ORAL at 09:29

## 2023-12-04 RX ADMIN — BUDESONIDE 0.5 MG: 0.5 INHALANT RESPIRATORY (INHALATION) at 07:21

## 2023-12-04 RX ADMIN — GUAIFENESIN 1200 MG: 600 TABLET, EXTENDED RELEASE ORAL at 21:19

## 2023-12-04 RX ADMIN — METHYLPREDNISOLONE SODIUM SUCCINATE 62.5 MG: 125 INJECTION INTRAMUSCULAR; INTRAVENOUS at 00:50

## 2023-12-04 RX ADMIN — DOXYCYCLINE HYCLATE 100 MG: 100 CAPSULE ORAL at 21:19

## 2023-12-04 RX ADMIN — PANTOPRAZOLE SODIUM 40 MG: 40 TABLET, DELAYED RELEASE ORAL at 07:01

## 2023-12-04 RX ADMIN — DOCUSATE SODIUM 50 MG AND SENNOSIDES 8.6 MG 1 TABLET: 8.6; 5 TABLET, FILM COATED ORAL at 00:59

## 2023-12-04 RX ADMIN — IPRATROPIUM BROMIDE AND ALBUTEROL SULFATE 3 ML: .5; 3 SOLUTION RESPIRATORY (INHALATION) at 15:23

## 2023-12-04 RX ADMIN — METHYLPREDNISOLONE SODIUM SUCCINATE 62.5 MG: 125 INJECTION INTRAMUSCULAR; INTRAVENOUS at 07:04

## 2023-12-04 RX ADMIN — ACETYLCYSTEINE 4 ML: 200 SOLUTION ORAL; RESPIRATORY (INHALATION) at 23:01

## 2023-12-04 RX ADMIN — DOCUSATE SODIUM 50 MG AND SENNOSIDES 8.6 MG 1 TABLET: 8.6; 5 TABLET, FILM COATED ORAL at 12:29

## 2023-12-04 ASSESSMENT — ACTIVITIES OF DAILY LIVING (ADL)
ADLS_ACUITY_SCORE: 22

## 2023-12-04 NOTE — PROGRESS NOTES
[facility]  Hospitalist Progress Note    Assessment & Plan     Aylin Harding is a 63 year old female admitted on 12/2/2023. She has a history of bronchiectasis, asthma, and pneumonia. She presents to the hospital with worsening shortness of breath, wheezing and cough since October.  At end of October patient started having cough with wheezing and shortness of breath since then she has been doing nebs 4 times a day.  It got to the point where it was getting worse, so she went and saw a physician in the clinic the day before Thanksgiving and she was given a prednisone taper starting with 60 mg coming down by 20 mg every 3 days.  She was also given a prescription for oral Augmentin for 1 week which she took and completed the course 12/1. That night had worsening shortness of breath and decided to come to the ER. Chest x-ray showed bilateral reticulonodular opacities consistent with atypical pneumonia.  Patient was tachycardic tachypneic on arrival to the ED with respiratory rate in the high 30s and was satting 86% on room air needing oxygen by facemask.  She remains on O2 12/3 but overall reports great improvement of dyspnea. Pulmonology consulted, plan as below.      Acute hypoxic respiratory failure secondary to acute asthma exacerbation  History of bronchiectasis  Possible atypical pneumonia;  Patient has been having worsening shortness of breath and cough and wheezing since end of October.  Was prescribed prednisone taper and course of Augmentin starting on the day before Thanksgiving. Was down to 20 mg of prednisone daily before coming to the ER for worsening dyspnea. While on high-dose steroids she felt better but recently she started feeling worse again so she came to ED. Found to have atypical pneumonia on imaging. Admitted to inpatient. Currently on 1-2 lpm nasal cannula, but reporting significant improvement of dyspnea.   - Pulmonology consulted, recommendations  appreciated   - Respiratory viral panel   - Stop breo and Incruse   - Budesonide neb 0.5 mg q12h   - Continue duoneb followed by mucomyst metaneb q4h   - Aerobika after nebs   - Switched to prednisone 60 mg daily with taper   - Continue Mucinex 1200 mg BID   - Sputum culture   - incentive spirometry  - blood sugar checks 4 times daily and sliding scale insulin while on IV steroids  - protonix while on steroids   - antibiotic stewardship, discontinued zosyn, continuing doxycycline      History of seasonal allergies;  Continue Zyrtec    Clinically Significant Risk Factors                             # Asthma: noted on problem list          Diet: Combination Diet Regular Diet Adult     DVT Prophylaxis:PCD  Springer Catheter: Not present  Lines: None     Cardiac Monitoring: ACTIVE order. Indication: Tachyarrhythmias, acute (48 hours)  Code Status: Full Code      Disposition Plan       Expected Discharge Date: 12/06/2023              Entered: Mark Solis DO 12/04/2023, 2:05 PM           Mark Solis DO  [facility]  Securely message with the Vocera Web Console (learn more here)  Text page via Dotted Block Paging/Directory    Interval History     - No acute events overnight  - Her breathing continues to improve  - Requiring 1-2 lpm o2, has been getting up to walk around    -Data reviewed today: I reviewed all new labs and imaging results over the last 24 hours.     Physical Exam   Temp: 98.3  F (36.8  C) Temp src: Oral BP: 104/58 Pulse: 87   Resp: 18 SpO2: 94 % O2 Device: Nasal cannula Oxygen Delivery: 1/2 LPM  There were no vitals filed for this visit.  Vital Signs with Ranges  Temp:  [97.7  F (36.5  C)-98.3  F (36.8  C)] 98.3  F (36.8  C)  Pulse:  [78-93] 87  Resp:  [16-18] 18  BP: ()/(54-64) 104/58  SpO2:  [92 %-95 %] 94 %  I/O last 3 completed shifts:  In: 500 [P.O.:400; I.V.:100]  Out: -     Constitutional: Awake, alert, cooperative, no apparent distress.    ENT: Normocephalic, without  obvious abnormality, atraumatic, oral pharynx with moist mucus membranes, tonsils without erythema or exudates.  Neck: Supple, symmetrical, trachea midline, no adenopathy.  Pulmonary: Normal effort, diffuse wheezing and rhonchi, slightly improved compared to 12/3.  Cardiovascular: Regular rate and rhythm, normal S1 and S2, no S3 or S4, and no murmur noted.  GI: Normal bowel sounds, soft, non-distended, non-tender.  Skin/Integumen: Visualized skin appeared clear.  Neuro: CN II-XII grossly intact.  Psych:  Alert and oriented x 3. Normal affect.  Extremities: No lower extremity edema noted, and calves are non-TTP bilaterally.     Medical Decision Making       45 MINUTES SPENT BY ME on the date of service doing chart review, history, exam, documentation & further activities per the note.         Medications      acetylcysteine  4 mL Nebulization Q4H    budesonide  0.5 mg Nebulization BID    cetirizine  10 mg Oral Daily    doxycycline hyclate  100 mg Oral Q12H ML (08/20)    guaiFENesin  1,200 mg Oral BID    hydroxychloroquine  200 mg Oral Daily    insulin aspart  1-7 Units Subcutaneous TID AC    insulin aspart  1-5 Units Subcutaneous At Bedtime    ipratropium - albuterol 0.5 mg/2.5 mg/3 mL  3 mL Nebulization Q4H WA    pantoprazole  40 mg Oral QAM AC    predniSONE  60 mg Oral Daily    sodium chloride (PF)  3 mL Intracatheter Q8H    Vitamin D3  25 mcg Oral Daily    zinc sulfate  220 mg Oral Daily     Data   Recent Labs   Lab 12/04/23  1232 12/04/23  0932 12/04/23  0215 12/03/23  1218 12/03/23  0756 12/02/23  1759 12/02/23  0934   WBC  --   --   --   --  12.2*  --  26.5*   HGB  --   --   --   --  11.9  --  15.4   MCV  --   --   --   --  89  --  89   PLT  --   --   --   --  161  --  223   NA  --   --   --   --  138  --  136   POTASSIUM  --   --   --   --  4.1  --  4.2   CHLORIDE  --   --   --   --  107  --  98   CO2  --   --   --   --  24  --  24   BUN  --   --   --   --  18.5  --  21.7   CR  --   --   --   --  0.87  --   0.97*   ANIONGAP  --   --   --   --  7  --  14   BJORN  --   --   --   --  8.5*  --  9.3   * 192* 146*   < > 144*   < > 89    < > = values in this interval not displayed.     No results found for this or any previous visit (from the past 24 hour(s)).    Mark Solis, DO

## 2023-12-04 NOTE — PLAN OF CARE
Goal Outcome Evaluation:       Summary: Pneumonia/bronchiectasis  DATE & TIME: 12/4/2023 2821-5086  Cognitive Concerns/ Orientation : Alert and oriented x 4   BEHAVIOR & AGGRESSION TOOL COLOR: Green  CIWA SCORE: NA   ABNL VS/O2: VSS ; oxygen reduced to 1/2 lpm n/c; 88% RA after walk  MOBILITY: Independent; ambulated fernández and BRP's  PAIN MANAGMENT: Denies   DIET: Regular; fair intake  BOWEL/BLADDER: Continent, feels constipated, Senna again today  ABNL LABS: , 105,    DRAIN/DEVICES: Peripheral IV SL right arm with intermittent antibiotics TELEMETRY RHYTHM: NSR  SKIN: WNL  TESTS/PROCEDURES: AM labs. Sputum sample needed.   D/C DAY/GOALS/PLACE: possibly as early as tomorrow if able to wean oxygen; Pulmonology following.   OTHER IMPORTANT INFO: Lungs coarse crackles; Using IS and flutter valve; use.

## 2023-12-04 NOTE — PROGRESS NOTES
Antimicrobial Stewardship Team Note    Antimicrobial Stewardship Program - A joint venture between [facility] and [facility] Consult[employer] to optimize antibiotic management.     Patient: Aylin Harding  MRN: [id number]  Allergies: Mold    Brief Summary: Aylin Harding is a 63 y.o. female admitted 12/2/23 with worsening cough and shortness of breath since October. Past medical history is significant for asthma, bronchiectasis (follows with [facility]), and allergies. 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 Patient went to urgent care on 11/22 and was diagnosed with bronchiectasis with acute exacerbation and was given a prednisone taper and Augmentin for 10 days.    Patient was afebrile, hypoxic to 86%, and had an elevated WBC at 26.5. Procalcitonin negative at 0.33. Chest CT shows diffuse tree-in-bud opacities and bronchovascular consolidation in the bilateral lower lobes, right middle lobe, lingula, and to a lesser degree the upper lobes, suspicious for infection (including atypical infection) and patulous air/fluid-filled esophagus with diffuse bronchial wall thickening, suggesting a component of aspiration. She was started on zosyn and doxycycline for suspected ongoing pneumonia.     Sputum cultures (bacterial culture and gram stain, fungal cultures, and acid-fast bacilli culture) ordered but unable to be obtained as patient is not producing sputum.         Active Anti-infective Medications   (From admission, onward)                 Start     Stop    12/03/23 2000  doxycycline hyclate  100 mg,   Oral,   EVERY 12 HOURS SCHEDULED        Community Acquired Pneumonia       12/09/23 0759    12/02/23 2200  hydroxychloroquine  200 mg,   Oral,   DAILY        arthritis       --    12/02/23 1930  piperacillin-tazobactam  3.375 g,   Intravenous,   EVERY 6 HOURS        Community Acquired Pneumonia       --                  Assessment: Possible  atypical pneumonia, bronchiectasis exacerbation  Patient is on day three of zosyn and doxycycline. She completed a 10 day course with Augmentin previously and has no history of pseudomonas in sputum samples. Patient has remained afebrile and oxygen requirements are decreasing. WBC decreased upon admission and the elevation may have been due to steroids given in the outpatient setting. Zosyn can be discontinued and doxycycline can be continued to cover for atypical pathogens that may have been missed with the outpatient course of antibiotics.     Recommendations:   Discontinue zosyn.   Continue doxycycline for 5 days.    D[facility] with Dr. Dagmar Coronado, Pharm[facility], [facility]    Vital Signs/Clinical Features:  Vitals         12/02 0700  12/03 0659 12/03 0700  12/04 0659 12/04 0700  12/04 1141   Most Recent      Temp ( F) 97.8 -  98.6    97.9 -  98.7      97.7     97.7 (36.5) 12/04 0822    Pulse 78 -  122    73 -  92      93     93 12/04 0822    Resp 22 -  56      18      16     16 12/04 0822    BP 93/55 -  121/78    95/60 -  108/64      91/54     91/54 12/04 0822    SpO2 (%) 86 -  97    92 -  99      92     92 12/04 0923            Labs  Estimated Creatinine Clearance: 75.3 mL/min (based on SCr of 0.87 mg/dL).  Recent Labs   Lab Test 04/18/17  1213 04/29/22  1343 05/25/23  0825 12/02/23  0934 12/03/23  0756   CR 1.07* 0.83 0.96* 0.97* 0.87       Recent Labs   Lab Test 04/18/17  1213 04/29/22  1343 05/25/23  0825 12/02/23  0934 12/03/23  0756   WBC 11.7* 16.2* 11.5* 26.5* 12.2*   ANEU 5.7  --   --   --   --    ALYM 4.9  --   --   --   --    BETZY 0.8  --   --   --   --    AEOS 0.2  --   --   --   --    HGB 15.7 14.7 14.4 15.4 11.9   HCT 45.4 44.1 43.5 46.1 36.0   MCV 86 88 87 89 89    242 220 223 161       Recent Labs   Lab Test 04/18/17  1213 04/29/22  1343 05/25/23  0825   BILITOTAL 1.4* 1.4* 0.8   ALKPHOS 82 104 107*   ALBUMIN 4.2 4.1 4.3   AST 13 21 24   ALT 28 26 23       Recent Labs   Lab Test 12/02/23  0932  12/02/23  0934   PCAL  --  0.33   LACT 1.1  --    CRPI  --  205.72*             Culture Results:  7-Day Micro Results       Procedure Component Value Units Date/Time    Respiratory Panel PCR     Order Status: Sent Lab Status: No result     Specimen: Swab     Respiratory Aerobic Bacterial Culture with Gram Stain     Order Status: Sent Lab Status: No result     Specimen: Sputum from Expectorate     Fungal or Yeast Culture Routine     Order Status: Sent Lab Status: No result     Specimen: Sputum     Acid-Fast Bacilli Culture and Stain     Order Status: Sent Lab Status: No result     Specimen: Sputum             Recent Labs   Lab Test 08/31/16  0805   URINEPH 5.5   NITRITE Negative   LEUKEST Moderate*   WBCU 3*                         Imaging: CT Chest w Contrast    Result Date: 12/2/2023  EXAM: CT CHEST W CONTRAST LOCATION: [facility] DATE: 12/2/2023 INDICATION: worsening SOB and Hypoxia with h o Bronchiectasis COMPARISON: CT chest 12/21/2017 TECHNIQUE: CT chest with IV contrast. Multiplanar reformats were obtained. Dose reduction techniques were used. CONTRAST: 81mL Isovue 370 FINDINGS: LUNGS AND PLEURA: Diffuse tree-in-bud opacities and bronchovascular consolidation in the right and left lower lobes, right middle lobe, and to a lesser degree the upper lobes. Similar extensive paramedian bronchiectasis in the right upper lobe, right middle lobe, lingula, and bilateral lower lobes with extensive airway thickening. 7 mm nodule in the peripheral right lower lobe (4/256) as well as adjacent smaller nodules (4/237) are minimally increased in size compared to 12/21/2017, suggesting benignity. MEDIASTINUM/AXILLAE: Normal heart size. No pericardial effusion. Thoracic aorta is nonaneurysmal. Scattered prominent mediastinal and hilar lymph nodes are minimally increased compared to 12/21/2017 and likely reactive. Patulous/air and fluid-filled esophagus with medium-sized hiatal hernia, suggesting  underlying esophageal dysmotility. CORONARY ARTERY CALCIFICATION: None. UPPER ABDOMEN: No significant finding. MUSCULOSKELETAL: No aggressive osseous lesions.     IMPRESSION: 1.  Diffuse tree-in-bud opacities and bronchovascular consolidation in the bilateral lower lobes, right middle lobe, lingula, and to a lesser degree the upper lobes, suspicious for infection (including atypical infection). 2.  Patulous air/fluid-filled esophagus with diffuse bronchial wall thickening, suggesting a component of aspiration. 3.  Similar paramedian and lower lobe bronchiectasis compared to 12/21/2017.    XR Chest 2 Views    Result Date: 12/2/2023  EXAM: XR CHEST 2 VIEWS LOCATION: [facility] DATE: 12/2/2023 INDICATION: Worsening shortness of breath and cough. COMPARISON: 08/02/2022     IMPRESSION: Heart size and vascularity are normal. Chronic right middle lobe and lingular scarring redemonstrated. Bilateral bronchial wall thickening with superimposed mid and lower lung predominant reticulonodular opacities. No pleural effusion. Differential considerations would include atypical infection and aspiration.

## 2023-12-04 NOTE — PROGRESS NOTES
12/04/23 0931   Appointment Info   Signing Clinician's Name / Credentials (PT) Joanie Garcia, PT, DPT   Living Environment   People in Home spouse   Current Living Arrangements house   Home Accessibility stairs within home   Number of Stairs, Within Home, Primary greater than 10 stairs   Stair Railings, Within Home, Primary railings safe and in good condition   Transportation Anticipated car, drives self;family or friend will provide   Living Environment Comments Lives in split level home 6+6 stairs   Self-Care   Usual Activity Tolerance good   Current Activity Tolerance moderate   Regular Exercise No   Equipment Currently Used at Home none   Fall history within last six months no   Activity/Exercise/Self-Care Comment Pt is independent without use of assistive device, works from home   General Information   Onset of Illness/Injury or Date of Surgery 12/02/23   Referring Physician Mark Solis, DO   Patient/Family Therapy Goals Statement (PT) To go home   Pertinent History of Current Problem (include personal factors and/or comorbidities that impact the POC) Pt is 63 year old female adm on 12/2/23 with worsening SOB, unable to tolerate any daily activities. Pt with history of bronchiectasis, had been treated with prednisone and doxycycline due to worsening symptoms in October, had another flare up around Thanksgiving and was given a higher dose of steroids and a course of Augmentin. Pt with worsening symptoms which prompted ED visit and admission for further management.   Existing Precautions/Restrictions no known precautions/restrictions   Cognition   Affect/Mental Status (Cognition) WFL   Orientation Status (Cognition) oriented x 4   Follows Commands (Cognition) WFL   Pain Assessment   Patient Currently in Pain No   Integumentary/Edema   Integumentary/Edema no deficits were identifed   Posture    Posture Not impaired   Range of Motion (ROM)   Range of Motion ROM is WFL   Strength (Manual Muscle Testing)    Strength (Manual Muscle Testing) strength is WFL   Bed Mobility   Bed Mobility no deficits identified   Transfers   Transfers no deficits identified   Gait/Stairs (Locomotion)   [place] Level (Gait) independent   Comment, (Gait/Stairs) Education provided on ambulating around room frequently and ambulating in halls when family is visiting to prevent further deconditioning and increase overall activity tolerance.   Balance   Balance no deficits were identified   Clinical Impression   Criteria for Skilled Therapeutic Intervention No problems identified which require skilled intervention   PT Total Evaluation Time   PT Eval, Low Complexity Minutes (76713) 15   PT Discharge Planning   PT Plan No inpatient PT needs, pt can ambulate on own and increase activity level as needed   PT Discharge Recommendation (DC Rec) home   PT Rationale for DC Rec Pt is independent with mobility, demonstrates decreased activity tolerance but can participate in own walking program during hospital stay, no skilled PT needs.   PT Brief overview of current status Independent   Total Session Time   Total Session Time (sum of timed and untimed services) 15

## 2023-12-04 NOTE — PROGRESS NOTES
"PULMONOLOGY PROGRESS NOTE  Date of Service: 12/04/2023      SUBJECTIVE      Feeling much better, o2 weaning, cough minimally productive.       OBJECTIVE   Vital signs:  Temp: 97.7  F (36.5  C) Temp src: Oral BP: 91/54 Pulse: 93   Resp: 16 SpO2: 92 % O2 Device: Nasal cannula Oxygen Delivery: 1.5 LPM      Estimated body mass index is 23.78 kg/m  as calculated from the following:    Height as of 6/13/23: 1.753 m (5' 9\").    Weight as of 11/22/23: 73 kg (161 lb).        I/O last 3 completed shifts:  In: 500 [P.O.:400; I.V.:100]  Out: -       CONSTITUTIONAL/GENERAL APPEARANCE: Alert female. No Apparent Distress.  PSYCHIATRIC: Pleasant and appropriate mood and affect. Oriented x 3.  NECK: Trachea mildline.   RESPIRATORY: Non-labored effort. Mild wheezing  CARDIOVASCULAR: S1, S2, regular rate and rhythm.  LABORATORY ASSESSMENT      CBC  Recent Labs   Lab 12/03/23  0756 12/02/23  0934   WBC 12.2* 26.5*   HGB 11.9 15.4   HCT 36.0 46.1    223     BMP  Recent Labs   Lab 12/04/23  0215 12/03/23  2232 12/03/23  1218 12/03/23  0756 12/02/23  1759 12/02/23  0934   NA  --   --   --  138  --  136   CHLORIDE  --   --   --  107  --  98   BJORN  --   --   --  8.5*  --  9.3   CO2  --   --   --  24  --  24   BUN  --   --   --  18.5  --  21.7   CR  --   --   --  0.87  --  0.97*   * 184*   < > 144*   < > 89    < > = values in this interval not displayed.     BNPNo lab results found in last 7 days.  Arterial Blood GasNo lab results found in last 7 days.  VENOUS BLOOD GASES  Recent Labs   Lab 12/02/23  0932   PHV 7.44*   PCO2V 35*   PO2V 38   HCO3V 23         IMAGING     CT Chest 12/2/23  1.  Diffuse tree-in-bud opacities and bronchovascular consolidation in the bilateral lower lobes, right middle lobe, lingula, and to a lesser degree the upper lobes, suspicious for infection (including atypical infection).   2.  Patulous air/fluid-filled esophagus with diffuse bronchial wall thickening, suggesting a component of aspiration.  3.  " Similar paramedian and lower lobe bronchiectasis compared to 12/21/2017.    PFT, SLEEP STUDY, RHC & OTHER TESTING       ASSESSMENT / PLAN      Pulmonary diagnoses (alphabetical):  Abnormal Chest Imaging R91.8  Asthma unspecified J45.909  Asthma w/ Exacerbation J45.901  Bronchiectasis J47.9  Cough R05  Hypoxemia R09.02  Pneumonia CAP / Unspecified J18.9  Respiratory Failure Acute J96.00  Shortness of Breath R06.02        ASSESSMENT:  63-year-old female with severe bronchiectasis, prior PNA, asthma, presenting with dyspnea, cough, and wheezing.     Follows [facility] and underwent bronchoscopy in 2017 with BAL growing Aspergillus.  Last seen 9/2023 by Dr. Hope and was maintained on Trelegy and albuterol, and given Rx for prednisone and doxycycline to use as needed.     Then developed progressive dyspnea and cough in October.  Seen at urgent care and received prednisone 60 mg, tapering by 20 mg every 3 days, as well as 1 week of Augmentin.  Initially improved but developed worsening dyspnea around Thanksgiving. On admission initially required oxymask, now weaning. Labs showed leukocytosis with markedly elevated CRP at 205, normal procalcitonin. Sputum cultures pending collection. CT chest shows diffuse tree-in-bud opacities with bronchovascular consolidation and bronchiectasis most notably in the RML and lingula.     Presentation is consistent with bronchiectasis and/or asthma flare, possibly infectious trigger.  Currently receiving DuoNeb, Mucomyst neb,  Breo, Incruse, steroids, Zosyn, doxycycline, and Mucinex. feeling better.         PLAN:   Check respiratory viral panel. Ordered.   Stop Breo and Incruse. Order removed.   Start budesonide neb 0.5 mg every 12 hours. Ordered.   Continue DuoNeb MetaNeb followed by Mucomyst MetaNeb every 4 hours.  Aerobika after nebs.   Prednisone 60mg/d, starring 10/4. Ordered, likely slow taper  Continue broad-spectrum antibiotics, ok to transition to PO  No role for  bronch at this time.  Will need outpt pulm follow-up and repeat CT chest with Dr Hope in 8-10 weeks  Continue Mucinex 1200 mg twice daily.  Please collect sputum cultures. Previously ordered.   Incentive spirometry 10 times/hour while awake.  Encourage OOB.  Will follow, perhaps home in 1-2d?      Sunil Verma  [facility] / [facility]  Office: [phone]  Pager: [phone]

## 2023-12-04 NOTE — PLAN OF CARE
Summary: Pneumonia/bronchiectasis  DATE & TIME: 12/3/23 Evenings    Cognitive Concerns/ Orientation : A&OX4. Calm, cooperative, and pleasant.   BEHAVIOR & AGGRESSION TOOL COLOR: Green  CIWA SCORE: NA   ABNL VS/O2: VSS. Weaned to 1.5L O2 via NC. LS coarse and wheezy. Scheduled nebs. Pt unable to produce sputum sample at this point.   MOBILITY: Independent. Moves freq.   PAIN MANAGMENT: Denied  DIET: Regular, good appetite.   BOWEL/BLADDER: Continent, using bathroom.   ABNL LABS: , . WBC 12.2  DRAIN/DEVICES: RFA PIV SL with intermittent abx and IV steroids.   TELEMETRY RHYTHM: NSR  SKIN: WNL  TESTS/PROCEDURES: AM labs. Sputum sample needed.   D/C DAY/GOALS/PLACE: Pending workup. Pulmonology following.   OTHER IMPORTANT INFO: Lungs coarse/on IV solumedrol/IV antibiotics. History of asthma, bilateral hearing aids.

## 2023-12-04 NOTE — PLAN OF CARE
Goal Outcome Evaluation:               Cognitive Concerns/ Orientation : Alert and oriented x 4   BEHAVIOR & AGGRESSION TOOL COLOR: Green  CIWA SCORE: NA   ABNL VS/O2: VSS on 1.5 LPM NC  MOBILITY: Independent   PAIN MANAGMENT: Denies   DIET: Regular  BOWEL/BLADDER: Continent, feels constipated, gave one Senna tonight  ABNL LABS: ,  WBC 12.2  DRAIN/DEVICES: Peripheral IV SL right arm with intermittent antibiotics and solu medrol   TELEMETRY RHYTHM: NSR  SKIN: WNL  TESTS/PROCEDURES: AM labs. Sputum sample needed.   D/C DAY/GOALS/PLACE: Pending workup. Pulmonology following.   OTHER IMPORTANT INFO: Lungs coarse/expiratory wheezes. Encouraged IS use. History of asthma, bilateral hearing aids.

## 2023-12-05 VITALS
DIASTOLIC BLOOD PRESSURE: 52 MMHG | SYSTOLIC BLOOD PRESSURE: 103 MMHG | OXYGEN SATURATION: 92 % | RESPIRATION RATE: 18 BRPM | HEART RATE: 86 BPM | TEMPERATURE: 98.3 F

## 2023-12-05 LAB
BACTERIA SPT CULT: NORMAL
C PNEUM DNA SPEC QL NAA+PROBE: NOT DETECTED
FLUAV H1 2009 PAND RNA SPEC QL NAA+PROBE: NOT DETECTED
FLUAV H1 RNA SPEC QL NAA+PROBE: NOT DETECTED
FLUAV H3 RNA SPEC QL NAA+PROBE: NOT DETECTED
FLUAV RNA SPEC QL NAA+PROBE: NOT DETECTED
FLUBV RNA SPEC QL NAA+PROBE: NOT DETECTED
GLUCOSE BLDC GLUCOMTR-MCNC: 137 MG/DL (ref 70–99)
GLUCOSE BLDC GLUCOMTR-MCNC: 84 MG/DL (ref 70–99)
GRAM STAIN RESULT: NORMAL
HADV DNA SPEC QL NAA+PROBE: NOT DETECTED
HCOV PNL SPEC NAA+PROBE: NOT DETECTED
HMPV RNA SPEC QL NAA+PROBE: NOT DETECTED
HPIV1 RNA SPEC QL NAA+PROBE: NOT DETECTED
HPIV2 RNA SPEC QL NAA+PROBE: NOT DETECTED
HPIV3 RNA SPEC QL NAA+PROBE: NOT DETECTED
HPIV4 RNA SPEC QL NAA+PROBE: NOT DETECTED
M PNEUMO DNA SPEC QL NAA+PROBE: NOT DETECTED
RSV RNA SPEC QL NAA+PROBE: NOT DETECTED
RSV RNA SPEC QL NAA+PROBE: NOT DETECTED
RV+EV RNA SPEC QL NAA+PROBE: DETECTED

## 2023-12-05 PROCEDURE — 87633 RESP VIRUS 12-25 TARGETS: CPT | Performed by: INTERNAL MEDICINE

## 2023-12-05 PROCEDURE — 250N000009 HC RX 250: Performed by: INTERNAL MEDICINE

## 2023-12-05 PROCEDURE — 87205 SMEAR GRAM STAIN: CPT | Performed by: INTERNAL MEDICINE

## 2023-12-05 PROCEDURE — 94640 AIRWAY INHALATION TREATMENT: CPT

## 2023-12-05 PROCEDURE — 999N000157 HC STATISTIC RCP TIME EA 10 MIN

## 2023-12-05 PROCEDURE — 87486 CHLMYD PNEUM DNA AMP PROBE: CPT | Performed by: INTERNAL MEDICINE

## 2023-12-05 PROCEDURE — 99239 HOSP IP/OBS DSCHRG MGMT >30: CPT | Performed by: STUDENT IN AN ORGANIZED HEALTH CARE EDUCATION/TRAINING PROGRAM

## 2023-12-05 PROCEDURE — 250N000012 HC RX MED GY IP 250 OP 636 PS 637: Performed by: INTERNAL MEDICINE

## 2023-12-05 PROCEDURE — 250N000013 HC RX MED GY IP 250 OP 250 PS 637: Performed by: INTERNAL MEDICINE

## 2023-12-05 PROCEDURE — 94640 AIRWAY INHALATION TREATMENT: CPT | Mod: 76

## 2023-12-05 RX ORDER — PREDNISONE 20 MG/1
20 TABLET ORAL DAILY
Qty: 4 TABLET | Refills: 0 | Status: SHIPPED | OUTPATIENT
Start: 2023-12-20 | End: 2023-12-24

## 2023-12-05 RX ORDER — PREDNISONE 20 MG/1
60 TABLET ORAL DAILY
Status: DISCONTINUED | OUTPATIENT
Start: 2023-12-06 | End: 2023-12-05 | Stop reason: HOSPADM

## 2023-12-05 RX ORDER — IPRATROPIUM BROMIDE AND ALBUTEROL SULFATE 2.5; .5 MG/3ML; MG/3ML
3 SOLUTION RESPIRATORY (INHALATION) EVERY 4 HOURS
Qty: 90 ML | Refills: 1 | Status: CANCELLED | OUTPATIENT
Start: 2023-12-05

## 2023-12-05 RX ORDER — PANTOPRAZOLE SODIUM 40 MG/1
40 TABLET, DELAYED RELEASE ORAL
Status: CANCELLED | OUTPATIENT
Start: 2023-12-06

## 2023-12-05 RX ORDER — IPRATROPIUM BROMIDE AND ALBUTEROL SULFATE 2.5; .5 MG/3ML; MG/3ML
3 SOLUTION RESPIRATORY (INHALATION) EVERY 4 HOURS
Qty: 180 ML | Refills: 0 | Status: SHIPPED | OUTPATIENT
Start: 2023-12-05 | End: 2023-12-26

## 2023-12-05 RX ORDER — PREDNISONE 20 MG/1
60 TABLET ORAL DAILY
Qty: 6 TABLET | Refills: 0 | Status: SHIPPED | OUTPATIENT
Start: 2023-12-06 | End: 2023-12-08

## 2023-12-05 RX ORDER — PREDNISONE 10 MG/1
10 TABLET ORAL DAILY
Status: DISCONTINUED | OUTPATIENT
Start: 2023-12-24 | End: 2023-12-05 | Stop reason: HOSPADM

## 2023-12-05 RX ORDER — GUAIFENESIN 600 MG/1
1200 TABLET, EXTENDED RELEASE ORAL 2 TIMES DAILY PRN
Qty: 30 TABLET | Refills: 0 | Status: CANCELLED | OUTPATIENT
Start: 2023-12-05

## 2023-12-05 RX ORDER — PREDNISONE 20 MG/1
40 TABLET ORAL DAILY
Qty: 8 TABLET | Refills: 0 | Status: SHIPPED | OUTPATIENT
Start: 2023-12-12 | End: 2023-12-16

## 2023-12-05 RX ORDER — DOXYCYCLINE 100 MG/1
100 CAPSULE ORAL EVERY 12 HOURS
Qty: 4 CAPSULE | Refills: 0 | Status: SHIPPED | OUTPATIENT
Start: 2023-12-05 | End: 2024-02-29

## 2023-12-05 RX ORDER — PANTOPRAZOLE SODIUM 40 MG/1
40 TABLET, DELAYED RELEASE ORAL
Qty: 21 TABLET | Refills: 0 | Status: SHIPPED | OUTPATIENT
Start: 2023-12-06 | End: 2023-12-27

## 2023-12-05 RX ORDER — DOXYCYCLINE 100 MG/1
100 CAPSULE ORAL EVERY 12 HOURS
Qty: 4 CAPSULE | Refills: 0 | Status: CANCELLED | OUTPATIENT
Start: 2023-12-05 | End: 2023-12-07

## 2023-12-05 RX ORDER — PREDNISONE 20 MG/1
20 TABLET ORAL DAILY
Status: DISCONTINUED | OUTPATIENT
Start: 2023-12-20 | End: 2023-12-05 | Stop reason: HOSPADM

## 2023-12-05 RX ORDER — PREDNISONE 10 MG/1
30 TABLET ORAL DAILY
Qty: 12 TABLET | Refills: 0 | Status: SHIPPED | OUTPATIENT
Start: 2023-12-16 | End: 2023-12-20

## 2023-12-05 RX ORDER — PREDNISONE 50 MG/1
50 TABLET ORAL DAILY
Qty: 4 TABLET | Refills: 0 | Status: SHIPPED | OUTPATIENT
Start: 2023-12-08 | End: 2023-12-12

## 2023-12-05 RX ORDER — GUAIFENESIN 600 MG/1
1200 TABLET, EXTENDED RELEASE ORAL 2 TIMES DAILY PRN
Qty: 30 TABLET | Refills: 0 | Status: SHIPPED | OUTPATIENT
Start: 2023-12-05 | End: 2024-02-29

## 2023-12-05 RX ORDER — PREDNISONE 20 MG/1
40 TABLET ORAL DAILY
Status: DISCONTINUED | OUTPATIENT
Start: 2023-12-12 | End: 2023-12-05 | Stop reason: HOSPADM

## 2023-12-05 RX ORDER — PREDNISONE 10 MG/1
10 TABLET ORAL DAILY
Qty: 4 TABLET | Refills: 0 | Status: SHIPPED | OUTPATIENT
Start: 2023-12-24 | End: 2023-12-28

## 2023-12-05 RX ADMIN — IPRATROPIUM BROMIDE AND ALBUTEROL SULFATE 3 ML: .5; 3 SOLUTION RESPIRATORY (INHALATION) at 07:30

## 2023-12-05 RX ADMIN — PANTOPRAZOLE SODIUM 40 MG: 40 TABLET, DELAYED RELEASE ORAL at 06:31

## 2023-12-05 RX ADMIN — ALBUTEROL SULFATE 2.5 MG: 2.5 SOLUTION RESPIRATORY (INHALATION) at 03:14

## 2023-12-05 RX ADMIN — DOXYCYCLINE HYCLATE 100 MG: 100 CAPSULE ORAL at 08:25

## 2023-12-05 RX ADMIN — CETIRIZINE HYDROCHLORIDE 10 MG: 10 TABLET, FILM COATED ORAL at 08:25

## 2023-12-05 RX ADMIN — Medication 25 MCG: at 08:25

## 2023-12-05 RX ADMIN — ACETYLCYSTEINE 4 ML: 200 SOLUTION ORAL; RESPIRATORY (INHALATION) at 07:30

## 2023-12-05 RX ADMIN — ZINC SULFATE 220 MG (50 MG) CAPSULE 220 MG: CAPSULE at 08:25

## 2023-12-05 RX ADMIN — ACETYLCYSTEINE 4 ML: 200 SOLUTION ORAL; RESPIRATORY (INHALATION) at 03:14

## 2023-12-05 RX ADMIN — GUAIFENESIN 1200 MG: 600 TABLET, EXTENDED RELEASE ORAL at 08:25

## 2023-12-05 RX ADMIN — PREDNISONE 60 MG: 20 TABLET ORAL at 08:25

## 2023-12-05 RX ADMIN — BUDESONIDE 0.5 MG: 0.5 INHALANT RESPIRATORY (INHALATION) at 07:30

## 2023-12-05 ASSESSMENT — ACTIVITIES OF DAILY LIVING (ADL)
ADLS_ACUITY_SCORE: 22

## 2023-12-05 NOTE — PLAN OF CARE
Summary: Pneumonia/bronchiectasis  DATE & TIME: 12/4/2023 6530-7172  Cognitive Concerns/ Orientation : Alert and oriented x 4, calm and cooperative  BEHAVIOR & AGGRESSION TOOL COLOR: Green  CIWA SCORE: NA   ABNL VS/O2: VSS on RA, spot check was 91-92%  MOBILITY: Independent   PAIN MANAGMENT: Denies   DIET: Regular; fair intake  BOWEL/BLADDER: Continent, feels constipated, given Senna  ABNL LABS: , 169    DRAIN/DEVICES: Peripheral IV SL right arm with intermittent antibiotics TELEMETRY RHYTHM: NSR  SKIN: WNL  TESTS/PROCEDURES: AM labs. Sputum sample needed.   D/C DAY/GOALS/PLACE: possibly as early as tomorrow if able to wean oxygen; Pulmonology following.   OTHER IMPORTANT INFO: Lungs coarse crackles, diminshed in bases; Using IS and flutter valve; use, on scheduled nebs

## 2023-12-05 NOTE — PLAN OF CARE
Goal Outcome Evaluation:      Discharge    Patient discharged to home via private car with spouse  Care plan note: patient has been without need of oxygen since late yesterday afternoon.  She is still somewhat short of breath but much improved, sats 92%.  She has been up ad adair and tolerating the activity. She has a nebulizer machine at home.  She will make her own follow up appointments with [facility] and if needed with her PCP.     Listed belongings gathered and given to patient (including from security/pharmacy). Yes  Care Plan and Patient education resolved: Yes  Prescriptions if needed, hard copies sent with patient  filled here and given to patient at discharge  Medication Bin checked and emptied on discharge Yes  SW/care coordinator/charge RN aware of discharge: Yes

## 2023-12-05 NOTE — PROGRESS NOTES
"PULMONOLOGY PROGRESS NOTE  Date of Service: 12/05/2023      SUBJECTIVE      Feeling better, cough minimally productive but did produce sputum sample. On room air.       OBJECTIVE   Vital signs:  Temp: 98.3  F (36.8  C) Temp src: Oral BP: 103/52 Pulse: 86   Resp: 18 SpO2: 92 % O2 Device: None (Room air) Oxygen Delivery: 1/2 LPM      Estimated body mass index is 23.78 kg/m  as calculated from the following:    Height as of 6/13/23: 1.753 m (5' 9\").    Weight as of 11/22/23: 73 kg (161 lb).        I/O last 3 completed shifts:  In: 360 [P.O.:360]  Out: -       CONSTITUTIONAL/GENERAL APPEARANCE: Alert female. No Apparent Distress.  PSYCHIATRIC: Pleasant and appropriate mood and affect. Oriented x 3.  NECK: Trachea mildline.   RESPIRATORY: Non-labored effort. Mild wheezing  CARDIOVASCULAR: S1, S2, regular rate and rhythm.  LABORATORY ASSESSMENT      CBC  Recent Labs   Lab 12/03/23  0756 12/02/23  0934   WBC 12.2* 26.5*   HGB 11.9 15.4   HCT 36.0 46.1    223     BMP  Recent Labs   Lab 12/05/23  0808 12/05/23  0208 12/03/23  1218 12/03/23  0756 12/02/23  1759 12/02/23  0934   NA  --   --   --  138  --  136   CHLORIDE  --   --   --  107  --  98   BJORN  --   --   --  8.5*  --  9.3   CO2  --   --   --  24  --  24   BUN  --   --   --  18.5  --  21.7   CR  --   --   --  0.87  --  0.97*   GLC 84 137*   < > 144*   < > 89    < > = values in this interval not displayed.     BNPNo lab results found in last 7 days.  Arterial Blood GasNo lab results found in last 7 days.  VENOUS BLOOD GASES  Recent Labs   Lab 12/02/23  0932   PHV 7.44*   PCO2V 35*   PO2V 38   HCO3V 23         IMAGING     CT Chest 12/2/23  1.  Diffuse tree-in-bud opacities and bronchovascular consolidation in the bilateral lower lobes, right middle lobe, lingula, and to a lesser degree the upper lobes, suspicious for infection (including atypical infection).   2.  Patulous air/fluid-filled esophagus with diffuse bronchial wall thickening, suggesting a component of " aspiration.  3.  Similar paramedian and lower lobe bronchiectasis compared to 12/21/2017.    PFT, SLEEP STUDY, RHC & OTHER TESTING       ASSESSMENT / PLAN      Pulmonary diagnoses (alphabetical):  Abnormal Chest Imaging R91.8  Asthma unspecified J45.909  Asthma w/ Exacerbation J45.901  Bronchiectasis J47.9  Cough R05  Hypoxemia R09.02  Pneumonia CAP / Unspecified J18.9  Respiratory Failure Acute J96.00  Shortness of Breath R06.02        ASSESSMENT:  63-year-old female with severe bronchiectasis, prior PNA, asthma, presenting with dyspnea, cough, and wheezing.     Follows [facility] and underwent bronchoscopy in 2017 with BAL growing Aspergillus.  Last seen 9/2023 by Dr. Hope and was maintained on Trelegy and albuterol, and given Rx for prednisone and doxycycline to use as needed.     Then developed progressive dyspnea and cough in October.  Seen at urgent care and received prednisone 60 mg, tapering by 20 mg every 3 days, as well as 1 week of Augmentin.  Initially improved but developed worsening dyspnea around Thanksgiving. On admission initially required oxymask, now weaning. Labs showed leukocytosis with markedly elevated CRP at 205, normal procalcitonin. Sputum cultures pending collection. CT chest shows diffuse tree-in-bud opacities with bronchovascular consolidation and bronchiectasis most notably in the RML and lingula.     Presentation is consistent with bronchiectasis and/or asthma flare, possibly infectious trigger.  improved with DuoNeb, Mucomyst neb,  Breo, Incruse, steroids, Zosyn, doxycycline, and Mucinex.        PLAN:     On discharge, resume home bronchodilators  Continue DuoNeb MetaNeb followed by Mucomyst MetaNeb every 4 hours. On discharge, needs new rx for duonebs qid prn.   Aerobika after nebs.   Prednisone 60mg/d, starring 10/4 and taper by 10mg every 4d  Agree with doxy  No role for bronch at this time.  Will need outpt pulm follow-up and repeat CT chest with Dr Hope in 8-10  weeks  Continue Mucinex 1200 mg twice daily.  Incentive spirometry 10 times/hour while awake.  Encourage OOB.  Agree with plans to discharge to home. Will sign off, please call if needed      Sunil Verma  [facility] / [facility]  Office: [phone]  Pager: [phone]

## 2023-12-05 NOTE — PLAN OF CARE
Goal Outcome Evaluation:         Summary: Pneumonia/bronchiectasis    DATE & TIME: 12/04/2023-12/05/2023 [id number]    Cognitive Concerns/ Orientation  A &O x4  BEHAVIOR & AGGRESSION TOOL COLOR: Green  ABNL VS/O2: VSS on RA  MOBILITY: Independent   PAIN MANAGMENT: Denied pain this shift   DIET: Regular  BOWEL/BLADDER: Continent  ABNL LABS:   DRAIN/DEVICES: R PIV SL    TELEMETRY RHYTHM: NSR  SKIN: WNL  TESTS/PROCEDURES: AM labs. Sputum sample collected and sent to lab - results pending   D/C DAY/GOALS/PLACE: Potentially 12/05/23    OTHER IMPORTANT INFO: Lungs coarse crackles, diminshed in bases; Using IS and flutter valve; on scheduled nebs

## 2023-12-06 NOTE — DISCHARGE SUMMARY
[facility]  Hospitalist Discharge Summary      Date of Admission:  12/2/2023  Date of Discharge:  12/5/2023 11:29 AM  Discharging Provider: Mark Solis DO  Discharge Service: Hospitalist Service    Discharge Diagnoses   Acute hypoxic respiratory failure secondary to acute asthma/bronchiectasis exacerbation, resolved  History of bronchiectasis  Possible atypical pneumonia  History of seasonal allergies    Clinically Significant Risk Factors          Follow-ups Needed After Discharge   Follow-up Appointments     Follow-up and recommended labs and tests       Resume your home inhalers. I have refilled duoneb solution for you to use   every four hours as needed at home. Please use your aerobika after using   duonebs. You will complete two more days of antibiotics on discharge (4   more doses of doxycycline). I also filled mucinex to use as needed for   congestion.     Please follow up with your primary care provider in 1-2 weeks for a   recheck after leaving the hospital if still having concerning symptoms.   Please follow up with your pulmonologist in 8-10 weeks for a recheck and   repeat CT scan of your chest. Return to the ED for worsening fevers,   chills, cough, shortness of breath.          Unresulted Labs Ordered in the Past 30 Days of this Admission       No orders found from 11/2/2023 to 12/3/2023.          Discharge Disposition   Discharged to home  Condition at discharge: Stable    Hospital Course   Aylin Harding is a 63 year old female admitted on 12/2/2023. She has a history of bronchiectasis, asthma, and pneumonia. She presents to the hospital with worsening shortness of breath, wheezing and cough since October.  At end of October patient started having cough with wheezing and shortness of breath since then she has been doing nebs 4 times a day.  It got to the point where it was getting worse, so she went and saw a physician in the clinic the day before Thanksgraciela  and she was given a prednisone taper starting with 60 mg coming down by 20 mg every 3 days.  She was also given a prescription for oral Augmentin for 1 week which she took and completed the course 12/1. That night had worsening shortness of breath and decided to come to the ER. Chest x-ray showed bilateral reticulonodular opacities consistent with atypical pneumonia.  Patient was tachycardic tachypneic on arrival to the ED with respiratory rate in the high 30s and was satting 86% on room air needing oxygen by facemask. Pulm consulted, started on prednisone, abx, and steroids with improvement of breathing. On room air at time of discharge and tolerating walks.     Acute hypoxic respiratory failure secondary to acute asthma exacerbation  History of bronchiectasis  Possible atypical pneumonia;  Patient has been having worsening shortness of breath and cough and wheezing since end of October.  Was prescribed prednisone taper and course of Augmentin starting on the day before Thanksgiving. Was down to 20 mg of prednisone daily before coming to the ER for worsening dyspnea. While on high-dose steroids she felt better but recently she started feeling worse again so she came to ED. Found to have atypical pneumonia on imaging. Admitted to inpatient. Improved with steroids, abx, and nebulizers. Pulm followed during admission. Discharged on 12/5.   - Pulmonology consulted, recommendations appreciated  On discharge, resume home bronchodilators   On discharge, needs new rx for duonebs qid prn.   Aerobika after nebs.   Prednisone 60mg/d, starring 10/4 and taper by 10mg every 4d  5 day course of antibiotics to cover atypical orgasms not covered by prior abx  No role for bronch at this time.  Will need outpt pulm follow-up and repeat CT chest with Dr Hope in 8-10 weeks  Prn mucinex on discharge      History of seasonal allergies;  Continue Zyrtec    Consultations This Hospital Stay   RESPIRATORY CARE IP CONSULT  PULMONARY IP  CONSULT  PHYSICAL THERAPY ADULT IP CONSULT    Code Status   Prior    Time Spent on this Encounter   IMark DO, personally saw the patient today and spent greater than 30 minutes discharging this patient.       Mark Solis DO  [place] [address] [facility]  [address] [place] [address]  Phone: [phone]  ______________________________________________________________________    Physical Exam   Vital Signs: Temp: 98.3  F (36.8  C) Temp src: Oral BP: 103/52 Pulse: 86   Resp: 18 SpO2: 92 % O2 Device: None (Room air)    Weight: 0 lbs 0 oz    Constitutional: Awake, alert, cooperative, no apparent distress.    ENT: Normocephalic, without obvious abnormality, atraumatic, oral pharynx with moist mucus membranes, tonsils without erythema or exudates.  Neck: Supple, symmetrical, trachea midline, no adenopathy.  Pulmonary: Normal effort, diffuse rhonchi, overall significantly improved  Cardiovascular: Regular rate and rhythm, normal S1 and S2, no S3 or S4, and no murmur noted.  GI: Normal bowel sounds, soft, non-distended, non-tender.  Skin/Integumen: Visualized skin appeared clear.  Neuro: CN II-XII grossly intact.  Psych:  Alert and oriented x 3. Normal affect.  Extremities: No lower extremity edema noted, and calves are non-TTP bilaterally.     Primary Care Physician   Erick Springer    Discharge Orders      Reason for your hospital stay    You were admitted for asthma/bronchiectasis exacerbation in the setting of possible pneumonia.     Follow-up and recommended labs and tests     Resume your home inhalers. I have refilled duoneb solution for you to use every four hours as needed at home. Please use your aerobika after using duonebs. You will complete two more days of antibiotics on discharge (4 more doses of doxycycline). I also filled mucinex to use as needed for congestion.     Please follow up with your primary care provider in 1-2 weeks for a recheck  after leaving the hospital if still having concerning symptoms. Please follow up with your pulmonologist in 8-10 weeks for a recheck and repeat CT scan of your chest. Return to the ED for worsening fevers, chills, cough, shortness of breath.     Activity    Your activity upon discharge: activity as tolerated     Diet    Follow this diet upon discharge: Orders Placed This Encounter      Combination Diet Regular Diet Adult       Significant Results and Procedures   Results for orders placed or performed during the hospital encounter of 12/02/23   XR Chest 2 Views    Narrative    EXAM: XR CHEST 2 VIEWS  LOCATION: [facility]  DATE: 12/2/2023    INDICATION: Worsening shortness of breath and cough.  COMPARISON: 08/02/2022      Impression    IMPRESSION: Heart size and vascularity are normal. Chronic right middle lobe and lingular scarring redemonstrated. Bilateral bronchial wall thickening with superimposed mid and lower lung predominant reticulonodular opacities. No pleural effusion.   Differential considerations would include atypical infection and aspiration.   CT Chest w Contrast    Narrative    EXAM: CT CHEST W CONTRAST  LOCATION: [facility]  DATE: 12/2/2023    INDICATION: worsening SOB and Hypoxia with h o Bronchiectasis  COMPARISON: CT chest 12/21/2017  TECHNIQUE: CT chest with IV contrast. Multiplanar reformats were obtained. Dose reduction techniques were used.    CONTRAST: 81mL Isovue 370    FINDINGS:   LUNGS AND PLEURA: Diffuse tree-in-bud opacities and bronchovascular consolidation in the right and left lower lobes, right middle lobe, and to a lesser degree the upper lobes. Similar extensive paramedian bronchiectasis in the right upper lobe, right   middle lobe, lingula, and bilateral lower lobes with extensive airway thickening. 7 mm nodule in the peripheral right lower lobe (4/256) as well as adjacent smaller nodules (4/237) are minimally increased in size  compared to 12/21/2017, suggesting   benignity.    MEDIASTINUM/AXILLAE: Normal heart size. No pericardial effusion. Thoracic aorta is nonaneurysmal. Scattered prominent mediastinal and hilar lymph nodes are minimally increased compared to 12/21/2017 and likely reactive. Patulous/air and fluid-filled   esophagus with medium-sized hiatal hernia, suggesting underlying esophageal dysmotility.    CORONARY ARTERY CALCIFICATION: None.    UPPER ABDOMEN: No significant finding.    MUSCULOSKELETAL: No aggressive osseous lesions.      Impression    IMPRESSION:   1.  Diffuse tree-in-bud opacities and bronchovascular consolidation in the bilateral lower lobes, right middle lobe, lingula, and to a lesser degree the upper lobes, suspicious for infection (including atypical infection).   2.  Patulous air/fluid-filled esophagus with diffuse bronchial wall thickening, suggesting a component of aspiration.  3.  Similar paramedian and lower lobe bronchiectasis compared to 12/21/2017.       Discharge Medications   Discharge Medication List as of 12/5/2023  9:47 AM        START taking these medications    Details   doxycycline hyclate (VIBRAMYCIN) 100 MG capsule Take 1 capsule (100 mg) by mouth every 12 hours, Disp-4 capsule, R-0, E-Prescribe      guaiFENesin (MUCINEX) 600 MG 12 hr tablet Take 2 tablets (1,200 mg) by mouth 2 times daily as needed for congestion, Disp-30 tablet, R-0, E-Prescribe      ipratropium - albuterol 0.5 mg/2.5 mg/3 mL (DUONEB) 0.5-2.5 (3) MG/3ML neb solution Take 1 vial (3 mLs) by nebulization every 4 hours, Disp-180 mL, R-0, E-Prescribe      pantoprazole (PROTONIX) 40 MG EC tablet Take 1 tablet (40 mg) by mouth every morning (before breakfast) for 21 days, Disp-21 tablet, R-0, E-Prescribe      !! predniSONE (DELTASONE) 10 MG tablet Take 3 tablets (30 mg) by mouth daily for 4 days, Disp-12 tablet, R-0, E-Prescribe      !! predniSONE (DELTASONE) 10 MG tablet Take 1 tablet (10 mg) by mouth daily for 4 days, Disp-4  tablet, R-0, E-Prescribe      !! predniSONE (DELTASONE) 20 MG tablet Take 3 tablets (60 mg) by mouth daily for 2 days, Disp-6 tablet, R-0, E-Prescribe      !! predniSONE (DELTASONE) 20 MG tablet Take 1 tablet (20 mg) by mouth daily for 4 days, Disp-4 tablet, R-0, E-Prescribe      !! predniSONE (DELTASONE) 20 MG tablet Take 2 tablets (40 mg) by mouth daily for 4 days, Disp-8 tablet, R-0, E-Prescribe      !! predniSONE (DELTASONE) 50 MG tablet Take 1 tablet (50 mg) by mouth daily for 4 days, Disp-4 tablet, R-0, E-Prescribe       !! - Potential duplicate medications found. Please discuss with provider.        CONTINUE these medications which have NOT CHANGED    Details   albuterol (PROAIR HFA/PROVENTIL HFA/VENTOLIN HFA) 108 (90 Base) MCG/ACT inhaler Inhale 2 puffs into the lungs every 6 hours, Disp-18 g, R-3, E-PrescribePharmacy may dispense brand covered by insurance (Proair, or proventil or ventolin or generic albuterol inhaler)      cetirizine (ZYRTEC) 10 MG tablet Take 10 mg by mouth daily, Historical      Fluticasone-Umeclidin-Vilanterol (TRELEGY ELLIPTA) 200-62.5-25 MCG/ACT oral inhaler Inhale 1 puff into the lungs daily, Historical      hydroxychloroquine (PLAQUENIL) 200 MG tablet Take 1 tablet (200 mg) by mouth daily, Disp-90 tablet, R-3, E-Prescribe      Vitamin D3 (VITAMIN D, CHOLECALCIFEROL,) 25 mcg (1000 units) tablet Take 1 tablet by mouth daily, Historical      zinc sulfate (ZINCATE) 220 (50 Zn) MG capsule Take 220 mg by mouth daily, Historical           Allergies   Allergies   Allergen Reactions    Mold

## 2023-12-07 ENCOUNTER — PATIENT OUTREACH (OUTPATIENT)
Dept: CARE COORDINATION | Facility: CLINIC | Age: 63
End: 2023-12-07
Payer: COMMERCIAL

## 2023-12-07 NOTE — PROGRESS NOTES
Connected Care Resource Center:   [facility] Contact  [facility]/Voicemail     Clinical Data: Post-Discharge Outreach     Outreach attempted x 2.  Left message on patient's voicemail, providing [facility]'s central phone number of [phone] ([phone]) for questions/concerns and/or to schedule an appt with an [facility] provider, if they do not have a PCP.      Plan:  [facility] will do no further outreaches at this time.       GEORGES Bojorquez  Connected Care Resource Center, [facility]    *Connected Care Resource Team does NOT follow patient ongoing. Referrals are identified based on internal discharge reports and the outreach is to ensure patient has an understanding of their discharge instructions.

## 2023-12-26 ENCOUNTER — MEDICAL CORRESPONDENCE (OUTPATIENT)
Dept: SCHEDULING | Facility: CLINIC | Age: 63
End: 2023-12-26

## 2023-12-26 ENCOUNTER — OFFICE VISIT (OUTPATIENT)
Dept: OTHER | Facility: CLINIC | Age: 63
End: 2023-12-26
Attending: INTERNAL MEDICINE
Payer: COMMERCIAL

## 2023-12-26 VITALS
DIASTOLIC BLOOD PRESSURE: 73 MMHG | SYSTOLIC BLOOD PRESSURE: 110 MMHG | OXYGEN SATURATION: 95 % | WEIGHT: 160.8 LBS | HEIGHT: 69 IN | BODY MASS INDEX: 23.82 KG/M2 | HEART RATE: 87 BPM

## 2023-12-26 DIAGNOSIS — J47.0 BRONCHIECTASIS WITH ACUTE LOWER RESPIRATORY INFECTION (H): Primary | ICD-10-CM

## 2023-12-26 PROCEDURE — 99214 OFFICE O/P EST MOD 30 MIN: CPT | Performed by: INTERNAL MEDICINE

## 2023-12-26 PROCEDURE — 99213 OFFICE O/P EST LOW 20 MIN: CPT | Performed by: INTERNAL MEDICINE

## 2023-12-26 RX ORDER — IPRATROPIUM BROMIDE AND ALBUTEROL SULFATE 2.5; .5 MG/3ML; MG/3ML
3 SOLUTION RESPIRATORY (INHALATION) EVERY 6 HOURS
Qty: 360 ML | Refills: 0 | Status: SHIPPED | OUTPATIENT
Start: 2023-12-26 | End: 2024-02-29

## 2023-12-26 RX ORDER — PREDNISONE 5 MG/1
TABLET ORAL
Qty: 37 TABLET | Refills: 0 | Status: SHIPPED | OUTPATIENT
Start: 2023-12-26 | End: 2024-02-29

## 2023-12-26 NOTE — PROGRESS NOTES
"Patient is here to discuss follow up    /73 (BP Location: Right arm, Patient Position: Chair, Cuff Size: Adult Regular)   Pulse 87   Ht 5' 9\" (1.753 m)   Wt 160 lb 12.8 oz (72.9 kg)   LMP  (LMP Unknown)   SpO2 95%   BMI 23.75 kg/m      Questions patient would like addressed today are: N/A.    Refills are needed: No    Has homecare services and agency name:  [facility]    "

## 2023-12-26 NOTE — PROGRESS NOTES
Aylin Harding is a 63 year old female who is presenting at the current time to discuss her diagnosi(es) of     Bronchiectasis with acute lower respiratory infection (H)  .      HPI:    Aylin Harding is a 63 year old female admitted to [facility] 12/2-5/2023. She has a history of bronchiectasis, asthma, and pneumonia. She presented to the hospital with worsening shortness of breath, wheezing and cough since October.  At the end of October the patient started having cough with wheezing and shortness of breath and since then she had been doing nebs 4 times a day.  It got to the point where it was getting worse, so she went and saw a physician in the clinic the day before Thanksgiving and she was given a prednisone taper starting with 60 mg coming down by 20 mg every 3 days.  She was also given a prescription for oral Augmentin for 1 week which she took and completed the course 12/1. That night she had worsening shortness of breath and decided to come to the ER. Chest x-ray showed bilateral reticulonodular opacities consistent with atypical pneumonia.  The patient was tachycardic and tachypneic on arrival to the ED with respiratory rate in the high 30s and was satting 86% on room air needing oxygen by facemask. Pulm consulted, started on prednisone, abx, and steroids with improvement of breathing. On room air at time of discharge and tolerating walks.     Pulmonology consulted, and they recommended upon discharge that she:    Resume home bronchodilators  Start a new rx for duonebs qid prn.   Aerobika after nebs.   Prednisone 60mg/d, starting 12/4 and taper by 10mg every 4d  5 day course of antibiotics to cover atypical organisms not covered by prior abx  No role for bronch at this time.  Will need outpt pulm follow-up and repeat CT chest with Dr Hope in 8-10 weeks  Prn mucinex on discharge       Since discharge she has been getting better albeit slowly. She is not coughing up purulent sputum. She has  difficulty getting f/u with her pulmonologist.       Review Of Systems  Skin: negative  Eyes: negative  Ears/Nose/Throat: negative  Respiratory: Shortness of breath- at rest, Dyspnea on exertion- with climbing stairs, and Cough- minimally productive , no purulence  Cardiovascular: negative  Gastrointestinal: negative  Genitourinary: negative  Musculoskeletal: negative  Neurologic: negative  Psychiatric: negative  Hematologic/Lymphatic/Immunologic: negative  Endocrine: negative          PAST MEDICAL HISTORY:                  Past Medical History:   Diagnosis Date    Asthma     Bronchiectasis 9/5/08    Hormone replacement therapy 2011    Osteoporosis 2001    followed by PCP (Dr. Springer)    Pneumonia, organism unspecified(486)     Uncomplicated asthma        PAST SURGICAL HISTORY:                  Past Surgical History:   Procedure Laterality Date    CARPAL TUNNEL RELEASE RT/LT  2009    [facility] ORAL SURGERY PROCEDURE         CURRENT MEDICATIONS:                  Current Outpatient Medications   Medication Sig Dispense Refill    albuterol (PROAIR HFA/PROVENTIL HFA/VENTOLIN HFA) 108 (90 Base) MCG/ACT inhaler Inhale 2 puffs into the lungs every 6 hours 18 g 3    cetirizine (ZYRTEC) 10 MG tablet Take 10 mg by mouth daily      doxycycline hyclate (VIBRAMYCIN) 100 MG capsule Take 1 capsule (100 mg) by mouth every 12 hours 4 capsule 0    Fluticasone-Umeclidin-Vilanterol (TRELEGY ELLIPTA) 200-62.5-25 MCG/ACT oral inhaler Inhale 1 puff into the lungs daily      guaiFENesin (MUCINEX) 600 MG 12 hr tablet Take 2 tablets (1,200 mg) by mouth 2 times daily as needed for congestion 30 tablet 0    hydroxychloroquine (PLAQUENIL) 200 MG tablet Take 1 tablet (200 mg) by mouth daily 90 tablet 3    ipratropium - albuterol 0.5 mg/2.5 mg/3 mL (DUONEB) 0.5-2.5 (3) MG/3ML neb solution Take 1 vial (3 mLs) by nebulization every 4 hours 180 mL 0    pantoprazole (PROTONIX) 40 MG EC tablet Take 1 tablet (40 mg) by mouth every morning (before breakfast)  for 21 days 21 tablet 0    predniSONE (DELTASONE) 10 MG tablet Take 1 tablet (10 mg) by mouth daily for 4 days 4 tablet 0    Vitamin D3 (VITAMIN D, CHOLECALCIFEROL,) 25 mcg (1000 units) tablet Take 1 tablet by mouth daily      zinc sulfate (ZINCATE) 220 (50 Zn) MG capsule Take 220 mg by mouth daily         ALLERGIES:                  Allergies   Allergen Reactions    Mold        SOCIAL HISTORY:                  Social History     Socioeconomic History    Marital status:      Spouse name: kerri    Number of children: 2    Years of education: 14    Highest education level: Not on file   Occupational History    Occupation: manager     Employer: [employer]   Tobacco Use    Smoking status: Never    Smokeless tobacco: Never   Vaping Use    Vaping Use: Never used   Substance and Sexual Activity    Alcohol use: Not Currently     Comment: rare socially    Drug use: No    Sexual activity: Yes     Partners: Male     Birth control/protection: Post-menopausal   Other Topics Concern    Parent/sibling w/ CABG, MI or angioplasty before 65F 55M? Not Asked   Social History Narrative    Not on file     Social Determinants of Health     Financial Resource Strain: Not on file   Food Insecurity: Not on file   Transportation Needs: Not on file   Physical Activity: Not on file   Stress: Not on file   Social Connections: Not on file   Interpersonal Safety: Not on file   Housing Stability: Not on file       FAMILY HISTORY:                   Family History   Problem Relation Age of Onset    Arthritis Mother         b:1937    Cancer Mother     Arthritis Father         b:1934    Family History Negative Sister         2 sisters b:1958,1962    Family History Negative Brother         2 brothers b:1960,1963    No Known Problems Maternal Grandmother     No Known Problems Maternal Grandfather     No Known Problems Paternal Grandmother     No Known Problems Other     No Known Problems Brother     No Known Problems Sister             Physical exam Reveals:    O/P: WNL  HEENT: WNL  NECK: No JVD, thyromegaly, or lymphadenopathy  HEART: RRR, no murmurs, gallops, or rubs  LUNGS: decreased BS bilaterally without rales, or rhonchi, positive wheezes bilaterally.  GI: NABS, nondistended, nontender, soft  EXT:without cyanosis, clubbing, or edema  NEURO: nonfocal  : no flank tenderness      A/P:    (J47.0) Bronchiectasis with acute lower respiratory infection (H)  (primary encounter diagnosis)  Comment: Refer to alternate pulmonary group given lack of ability to schedule with original group. RTC one month with myself. Continue slow prednisone wean. Continue DuoNebs.   Plan: Adult Pulmonary Medicine  Referral,         predniSONE (DELTASONE) 5 MG tablet, ipratropium        - albuterol 0.5 mg/2.5 mg/3 mL (DUONEB) 0.5-2.5        (3) MG/3ML neb solution            39 minutes total care on today's date.

## 2024-01-02 ENCOUNTER — TELEPHONE (OUTPATIENT)
Dept: OTHER | Facility: CLINIC | Age: 64
End: 2024-01-02
Payer: COMMERCIAL

## 2024-01-16 ENCOUNTER — ANCILLARY PROCEDURE (OUTPATIENT)
Dept: CT IMAGING | Facility: CLINIC | Age: 64
End: 2024-01-16
Attending: INTERNAL MEDICINE
Payer: COMMERCIAL

## 2024-01-16 DIAGNOSIS — J47.9 BRONCHIECTASIS, UNCOMPLICATED (H): ICD-10-CM

## 2024-01-16 DIAGNOSIS — R91.8 OTHER NONSPECIFIC ABNORMAL FINDING OF LUNG FIELD: ICD-10-CM

## 2024-01-16 PROCEDURE — 71250 CT THORAX DX C-: CPT

## 2024-01-22 ENCOUNTER — TRANSFERRED RECORDS (OUTPATIENT)
Dept: PULMONOLOGY | Facility: CLINIC | Age: 64
End: 2024-01-22

## 2024-01-25 ENCOUNTER — OFFICE VISIT (OUTPATIENT)
Dept: OTHER | Facility: CLINIC | Age: 64
End: 2024-01-25
Attending: INTERNAL MEDICINE
Payer: COMMERCIAL

## 2024-01-25 VITALS
HEART RATE: 93 BPM | WEIGHT: 168 LBS | SYSTOLIC BLOOD PRESSURE: 126 MMHG | BODY MASS INDEX: 24.88 KG/M2 | OXYGEN SATURATION: 96 % | DIASTOLIC BLOOD PRESSURE: 77 MMHG | HEIGHT: 69 IN

## 2024-01-25 DIAGNOSIS — J47.0 BRONCHIECTASIS WITH ACUTE LOWER RESPIRATORY INFECTION (H): ICD-10-CM

## 2024-01-25 PROCEDURE — G2211 COMPLEX E/M VISIT ADD ON: HCPCS | Performed by: INTERNAL MEDICINE

## 2024-01-25 PROCEDURE — 99214 OFFICE O/P EST MOD 30 MIN: CPT | Performed by: INTERNAL MEDICINE

## 2024-01-25 PROCEDURE — 99213 OFFICE O/P EST LOW 20 MIN: CPT | Performed by: INTERNAL MEDICINE

## 2024-01-25 NOTE — PROGRESS NOTES
Aylin Harding is a 63 year old female who is presenting at the current time to discuss her diagnosi(es) of      Bronchiectasis with acute lower respiratory infection (H)        A/P:     (J47.0) Bronchiectasis with acute lower respiratory infection (H)  (primary encounter diagnosis)  Comment:Continue with pulmonary. RTC one month with myself.   Plan: Continue slow prednisone wean. Continue DuoNebs.              30 minutes total care on today's date.     The longitudinal care of plan for [place] bronchiectasis was addressed during this visit. Due to added complexity of care, we will continue to supprt Aylin  and the subsequent management of this condition and with ongoing continuity of care for this condition.           HPI:     Aylin Harding is a 63 year old female admitted to [facility] 12/2-5/2023. She has a history of bronchiectasis, asthma, and pneumonia. She presented to the hospital with worsening shortness of breath, wheezing and cough since October.  At the end of October the patient started having cough with wheezing and shortness of breath and since then she had been doing nebs 4 times a day.  It got to the point where it was getting worse, so she went and saw a physician in the clinic the day before Thanksgiving and she was given a prednisone taper starting with 60 mg coming down by 20 mg every 3 days.  She was also given a prescription for oral Augmentin for 1 week which she took and completed the course 12/1. That night she had worsening shortness of breath and decided to come to the ER. Chest x-ray showed bilateral reticulonodular opacities consistent with atypical pneumonia.  The patient was tachycardic and tachypneic on arrival to the ED with respiratory rate in the high 30s and was satting 86% on room air needing oxygen by facemask. Pulm consulted, started on prednisone, abx, and steroids with improvement of breathing. On room air at time of discharge and tolerating walks.      Pulmonology consulted, and they recommended upon discharge that she:     Resume home bronchodilators  Start a new rx for duonebs qid prn.   Aerobika after nebs.   Prednisone 60mg/d, starting 12/4 and taper by 10mg every 4d  5 day course of antibiotics to cover atypical organisms not covered by prior abx  No role for bronch at this time.  Will need outpt pulm follow-up and repeat CT chest with Dr Hope in 8-10 weeks  Prn mucinex on discharge         Since discharge she has been getting better albeit slowly. She is not coughing up purulent sputum.         Review Of Systems  Skin: negative  Eyes: negative  Ears/Nose/Throat: negative  Respiratory: Shortness of breath- at rest, Dyspnea on exertion- with climbing stairs, and Cough- minimally productive , no purulence  Cardiovascular: negative  Gastrointestinal: negative  Genitourinary: negative  Musculoskeletal: negative  Neurologic: negative  Psychiatric: negative  Hematologic/Lymphatic/Immunologic: negative  Endocrine: negative              PAST MEDICAL HISTORY:                  Past Medical History        Past Medical History:   Diagnosis Date    Asthma      Bronchiectasis 9/5/08    Hormone replacement therapy 2011    Osteoporosis 2001     followed by PCP (Dr. Springer)    Pneumonia, organism unspecified(486)      Uncomplicated asthma              PAST SURGICAL HISTORY:                  Past Surgical History         Past Surgical History:   Procedure Laterality Date    CARPAL TUNNEL RELEASE RT/LT   2009    [facility] ORAL SURGERY PROCEDURE                CURRENT MEDICATIONS:                  Current Outpatient Prescriptions          Current Outpatient Medications   Medication Sig Dispense Refill    albuterol (PROAIR HFA/PROVENTIL HFA/VENTOLIN HFA) 108 (90 Base) MCG/ACT inhaler Inhale 2 puffs into the lungs every 6 hours 18 g 3    cetirizine (ZYRTEC) 10 MG tablet Take 10 mg by mouth daily        doxycycline hyclate (VIBRAMYCIN) 100 MG capsule Take 1 capsule (100 mg) by mouth  every 12 hours 4 capsule 0    Fluticasone-Umeclidin-Vilanterol (TRELEGY ELLIPTA) 200-62.5-25 MCG/ACT oral inhaler Inhale 1 puff into the lungs daily        guaiFENesin (MUCINEX) 600 MG 12 hr tablet Take 2 tablets (1,200 mg) by mouth 2 times daily as needed for congestion 30 tablet 0    hydroxychloroquine (PLAQUENIL) 200 MG tablet Take 1 tablet (200 mg) by mouth daily 90 tablet 3    ipratropium - albuterol 0.5 mg/2.5 mg/3 mL (DUONEB) 0.5-2.5 (3) MG/3ML neb solution Take 1 vial (3 mLs) by nebulization every 4 hours 180 mL 0    pantoprazole (PROTONIX) 40 MG EC tablet Take 1 tablet (40 mg) by mouth every morning (before breakfast) for 21 days 21 tablet 0    predniSONE (DELTASONE) 10 MG tablet Take 1 tablet (10 mg) by mouth daily for 4 days 4 tablet 0    Vitamin D3 (VITAMIN D, CHOLECALCIFEROL,) 25 mcg (1000 units) tablet Take 1 tablet by mouth daily        zinc sulfate (ZINCATE) 220 (50 Zn) MG capsule Take 220 mg by mouth daily                ALLERGIES:                       Allergies   Allergen Reactions    Mold           SOCIAL HISTORY:                  Social History   Social History            Socioeconomic History    Marital status:        Spouse name: kerri    Number of children: 2    Years of education: 14    Highest education level: Not on file   Occupational History    Occupation: manager       Employer: [employer]   Tobacco Use    Smoking status: Never    Smokeless tobacco: Never   Vaping Use    Vaping Use: Never used   Substance and Sexual Activity    Alcohol use: Not Currently       Comment: rare socially    Drug use: No    Sexual activity: Yes       Partners: Male       Birth control/protection: Post-menopausal   Other Topics Concern    Parent/sibling w/ CABG, MI or angioplasty before 65F 55M? Not Asked   Social History Narrative    Not on file      Social Determinants of Health      Financial Resource Strain: Not on file   Food Insecurity: Not on file   Transportation Needs: Not on file    Physical Activity: Not on file   Stress: Not on file   Social Connections: Not on file   Interpersonal Safety: Not on file   Housing Stability: Not on file            FAMILY HISTORY:                   Family History         Family History   Problem Relation Age of Onset    Arthritis Mother           b:1937    Cancer Mother      Arthritis Father           b:1934    Family History Negative Sister           2 sisters b:1958,1962    Family History Negative Brother           2 brothers b:1960,1963    No Known Problems Maternal Grandmother      No Known Problems Maternal Grandfather      No Known Problems Paternal Grandmother      No Known Problems Other      No Known Problems Brother      No Known Problems Sister                    Physical exam Reveals:     O/P: WNL  HEENT: WNL  NECK: No JVD, thyromegaly, or lymphadenopathy  HEART: RRR, no murmurs, gallops, or rubs  LUNGS: decreased BS bilaterally without rales, or rhonchi, positive wheezes bilaterally.  GI: NABS, nondistended, nontender, soft  EXT:without cyanosis, clubbing, or edema  NEURO: nonfocal  : no flank tenderness

## 2024-01-25 NOTE — PROGRESS NOTES
"Patient is here to discuss follow up    /77 (BP Location: Right arm, Patient Position: Chair, Cuff Size: Adult Regular)   Pulse 93   Ht 5' 9\" (1.753 m)   Wt 168 lb (76.2 kg)   LMP  (LMP Unknown)   SpO2 96%   BMI 24.81 kg/m      Questions patient would like addressed today are: N/A.    Refills are needed: No    Has homecare services and agency name:  [facility]    "

## 2024-01-26 ENCOUNTER — TELEPHONE (OUTPATIENT)
Dept: OTHER | Facility: CLINIC | Age: 64
End: 2024-01-26
Payer: COMMERCIAL

## 2024-01-26 NOTE — TELEPHONE ENCOUNTER
Follow-up to 1/25/24    In clinic visit in one month.    Appt Note:  follow-up to 1/25/24 (bronchiectasis)

## 2024-02-29 ENCOUNTER — OFFICE VISIT (OUTPATIENT)
Dept: OTHER | Facility: CLINIC | Age: 64
End: 2024-02-29
Attending: INTERNAL MEDICINE
Payer: COMMERCIAL

## 2024-02-29 VITALS
HEIGHT: 69 IN | SYSTOLIC BLOOD PRESSURE: 119 MMHG | BODY MASS INDEX: 25.39 KG/M2 | WEIGHT: 171.4 LBS | OXYGEN SATURATION: 98 % | DIASTOLIC BLOOD PRESSURE: 76 MMHG | HEART RATE: 76 BPM

## 2024-02-29 DIAGNOSIS — J47.0 BRONCHIECTASIS WITH ACUTE LOWER RESPIRATORY INFECTION (H): Primary | ICD-10-CM

## 2024-02-29 PROCEDURE — 99213 OFFICE O/P EST LOW 20 MIN: CPT | Performed by: INTERNAL MEDICINE

## 2024-02-29 PROCEDURE — 99214 OFFICE O/P EST MOD 30 MIN: CPT | Performed by: INTERNAL MEDICINE

## 2024-02-29 PROCEDURE — G2211 COMPLEX E/M VISIT ADD ON: HCPCS | Performed by: INTERNAL MEDICINE

## 2024-02-29 NOTE — PROGRESS NOTES
VASCULAR MEDICINE FOLLOW UP VISIT          A/P:     (J47.0) Bronchiectasis with acute lower respiratory infection (H)  (primary encounter diagnosis)    Comment:With her slow prednisone wean and DuoNebs she notes improvement in her sx sin general..   Plan: Continue DuoNebs. Continue with pulmonary prn.       The longitudinal care of plan for [place] bronchiectasis was addressed during this visit. Due to added complexity of care, we will continue to supprt Aylin  and the subsequent management of this condition and with ongoing continuity of care for this condition.              30 minutes total care on today's date.              Aylin Harding is a 63 year old female who is presenting at the current time to discuss her diagnosi(es) of      Bronchiectasis with acute lower respiratory infection (H)        HPI:     Aylin Harding is a 63 year old female admitted to [facility] 12/2-5/2023. She has a history of bronchiectasis, asthma, and pneumonia. She presented to the hospital with worsening shortness of breath, wheezing and cough since October.  At the end of October the patient started having cough with wheezing and shortness of breath and since then she had been doing nebs 4 times a day.  It got to the point where it was getting worse, so she went and saw a physician in the clinic the day before Thanksgiving and she was given a prednisone taper starting with 60 mg coming down by 20 mg every 3 days.  She was also given a prescription for oral Augmentin for 1 week which she took and completed the course 12/1. That night she had worsening shortness of breath and decided to come to the ER. Chest x-ray showed bilateral reticulonodular opacities consistent with atypical pneumonia.  The patient was tachycardic and tachypneic on arrival to the ED with respiratory rate in the high 30s and was satting 86% on room air needing oxygen by facemask. Pulm consulted, started on prednisone, abx, and steroids with  improvement of breathing. On room air at time of discharge and tolerating walks.     Pulmonology consulted, and they recommended upon discharge that she:     Resume home bronchodilators  Start a new rx for duonebs qid prn.   Aerobika after nebs.   Prednisone 60mg/d, starting 12/4 and taper by 10mg every 4d  5 day course of antibiotics to cover atypical organisms not covered by prior abx  No role for bronch at this time.  Will need outpt pulm follow-up and repeat CT chest with Dr Hope in 8-10 weeks  Prn mucinex on discharge         Since discharge she has been getting better albeit slowly. She is not coughing up purulent sputum.         Review Of Systems  Skin: negative  Eyes: negative  Ears/Nose/Throat: negative  Respiratory: Shortness of breath- at rest, Dyspnea on exertion- with climbing stairs, and Cough- minimally productive , no purulence  Cardiovascular: negative  Gastrointestinal: negative  Genitourinary: negative  Musculoskeletal: negative  Neurologic: negative  Psychiatric: negative  Hematologic/Lymphatic/Immunologic: negative  Endocrine: negative              PAST MEDICAL HISTORY:                  Past Medical History           Past Medical History:   Diagnosis Date    Asthma      Bronchiectasis 9/5/08    Hormone replacement therapy 2011    Osteoporosis 2001     followed by PCP (Dr. Springer)    Pneumonia, organism unspecified(486)      Uncomplicated asthma              PAST SURGICAL HISTORY:                  Past Surgical History             Past Surgical History:   Procedure Laterality Date    CARPAL TUNNEL RELEASE RT/LT   2009    [facility] ORAL SURGERY PROCEDURE                CURRENT MEDICATIONS:                  Current Outpatient Prescriptions               Current Outpatient Medications   Medication Sig Dispense Refill    albuterol (PROAIR HFA/PROVENTIL HFA/VENTOLIN HFA) 108 (90 Base) MCG/ACT inhaler Inhale 2 puffs into the lungs every 6 hours 18 g 3    cetirizine (ZYRTEC) 10 MG tablet Take 10 mg by mouth  daily        doxycycline hyclate (VIBRAMYCIN) 100 MG capsule Take 1 capsule (100 mg) by mouth every 12 hours 4 capsule 0    Fluticasone-Umeclidin-Vilanterol (TRELEGY ELLIPTA) 200-62.5-25 MCG/ACT oral inhaler Inhale 1 puff into the lungs daily        guaiFENesin (MUCINEX) 600 MG 12 hr tablet Take 2 tablets (1,200 mg) by mouth 2 times daily as needed for congestion 30 tablet 0    hydroxychloroquine (PLAQUENIL) 200 MG tablet Take 1 tablet (200 mg) by mouth daily 90 tablet 3    ipratropium - albuterol 0.5 mg/2.5 mg/3 mL (DUONEB) 0.5-2.5 (3) MG/3ML neb solution Take 1 vial (3 mLs) by nebulization every 4 hours 180 mL 0    pantoprazole (PROTONIX) 40 MG EC tablet Take 1 tablet (40 mg) by mouth every morning (before breakfast) for 21 days 21 tablet 0    predniSONE (DELTASONE) 10 MG tablet Take 1 tablet (10 mg) by mouth daily for 4 days 4 tablet 0    Vitamin D3 (VITAMIN D, CHOLECALCIFEROL,) 25 mcg (1000 units) tablet Take 1 tablet by mouth daily        zinc sulfate (ZINCATE) 220 (50 Zn) MG capsule Take 220 mg by mouth daily                ALLERGIES:                          Allergies   Allergen Reactions    Mold           SOCIAL HISTORY:                  Social History   Social History                Socioeconomic History    Marital status:        Spouse name: kerri    Number of children: 2    Years of education: 14    Highest education level: Not on file   Occupational History    Occupation: manager       Employer: [employer]   Tobacco Use    Smoking status: Never    Smokeless tobacco: Never   Vaping Use    Vaping Use: Never used   Substance and Sexual Activity    Alcohol use: Not Currently       Comment: rare socially    Drug use: No    Sexual activity: Yes       Partners: Male       Birth control/protection: Post-menopausal   Other Topics Concern    Parent/sibling w/ CABG, MI or angioplasty before 65F 55M? Not Asked   Social History Narrative    Not on file      Social Determinants of Health       Financial Resource Strain: Not on file   Food Insecurity: Not on file   Transportation Needs: Not on file   Physical Activity: Not on file   Stress: Not on file   Social Connections: Not on file   Interpersonal Safety: Not on file   Housing Stability: Not on file            FAMILY HISTORY:                   Family History             Family History   Problem Relation Age of Onset    Arthritis Mother           b:1937    Cancer Mother      Arthritis Father           b:1934    Family History Negative Sister           2 sisters b:1958,1962    Family History Negative Brother           2 brothers b:1960,1963    No Known Problems Maternal Grandmother      No Known Problems Maternal Grandfather      No Known Problems Paternal Grandmother      No Known Problems Other      No Known Problems Brother      No Known Problems Sister                    Physical exam Reveals:     O/P: WNL  HEENT: WNL  NECK: No JVD, thyromegaly, or lymphadenopathy  HEART: RRR, no murmurs, gallops, or rubs  LUNGS: decreased BS bilaterally without rales, or rhonchi, positive wheezes bilaterally.  GI: NABS, nondistended, nontender, soft  EXT:without cyanosis, clubbing, or edema  NEURO: nonfocal  : no flank tenderness

## 2024-02-29 NOTE — PROGRESS NOTES
"Patient is here to discuss follow up    /76 (BP Location: Right arm, Patient Position: Chair, Cuff Size: Adult Regular)   Pulse 76   Ht 5' 9\" (1.753 m)   Wt 171 lb 6.4 oz (77.7 kg)   LMP  (LMP Unknown)   SpO2 98%   BMI 25.31 kg/m      Questions patient would like addressed today are: N/A.    Refills are needed: No    Has homecare services and agency name:  [facility]    "

## 2024-04-08 DIAGNOSIS — J00 ACUTE NASOPHARYNGITIS: Primary | ICD-10-CM

## 2024-04-08 NOTE — TELEPHONE ENCOUNTER
Received fax from [facility] requesting a reifll of azithromycin 500 mg tablets (qty 7).    Instructions on Rx direct patient:    Take one tablet by mouth daily for 7 days.  Take in case of exacerbation.  Stop hydroxychloroquine while taking this.

## 2024-04-09 RX ORDER — AZITHROMYCIN 500 MG/1
TABLET, FILM COATED ORAL
Qty: 7 TABLET | Refills: 0 | Status: SHIPPED | OUTPATIENT
Start: 2024-04-09

## 2024-04-10 ENCOUNTER — ANCILLARY PROCEDURE (OUTPATIENT)
Dept: GENERAL RADIOLOGY | Facility: CLINIC | Age: 64
End: 2024-04-10
Attending: PHYSICIAN ASSISTANT
Payer: COMMERCIAL

## 2024-04-10 ENCOUNTER — OFFICE VISIT (OUTPATIENT)
Dept: URGENT CARE | Facility: URGENT CARE | Age: 64
End: 2024-04-10
Payer: COMMERCIAL

## 2024-04-10 VITALS
SYSTOLIC BLOOD PRESSURE: 118 MMHG | TEMPERATURE: 97.8 F | RESPIRATION RATE: 14 BRPM | DIASTOLIC BLOOD PRESSURE: 79 MMHG | HEART RATE: 77 BPM | OXYGEN SATURATION: 96 %

## 2024-04-10 DIAGNOSIS — J47.1 BRONCHIECTASIS WITH ACUTE EXACERBATION (H): Primary | ICD-10-CM

## 2024-04-10 DIAGNOSIS — R05.3 PERSISTENT COUGH: ICD-10-CM

## 2024-04-10 DIAGNOSIS — J30.9 ALLERGIC RHINITIS, UNSPECIFIED SEASONALITY, UNSPECIFIED TRIGGER: ICD-10-CM

## 2024-04-10 PROCEDURE — 99214 OFFICE O/P EST MOD 30 MIN: CPT | Performed by: PHYSICIAN ASSISTANT

## 2024-04-10 PROCEDURE — 71046 X-RAY EXAM CHEST 2 VIEWS: CPT | Mod: TC | Performed by: RADIOLOGY

## 2024-04-10 RX ORDER — FLUTICASONE PROPIONATE 50 MCG
1 SPRAY, SUSPENSION (ML) NASAL DAILY
Qty: 18.2 ML | Refills: 5 | Status: SHIPPED | OUTPATIENT
Start: 2024-04-10

## 2024-04-10 RX ORDER — PREDNISONE 20 MG/1
20 TABLET ORAL DAILY
Qty: 5 TABLET | Refills: 0 | Status: SHIPPED | OUTPATIENT
Start: 2024-04-10 | End: 2024-04-15

## 2024-04-10 RX ORDER — DOXYCYCLINE 100 MG/1
100 CAPSULE ORAL 2 TIMES DAILY
Qty: 20 CAPSULE | Refills: 0 | Status: SHIPPED | OUTPATIENT
Start: 2024-04-10 | End: 2024-04-20

## 2024-04-10 NOTE — PROGRESS NOTES
Patient presents with:  Cough: Patient here with complaint of ongoing cough post Covid. Patient states she tested positive for Covid on 4/1/2024. States her cough is now painful. She has also been wheezing.     (J47.1) Bronchiectasis with acute exacerbation (H)  (primary encounter diagnosis)  Comment:   Plan: doxycycline hyclate (VIBRAMYCIN) 100 MG         capsule, predniSONE (DELTASONE) 20 MG tablet            (R05.3) Persistent cough  Comment:   Plan: XR Chest 2 Views, doxycycline hyclate         (VIBRAMYCIN) 100 MG capsule, predniSONE         (DELTASONE) 20 MG tablet     Home nebulizers as needed.       (J30.9) Allergic rhinitis, unspecified seasonality, unspecified trigger  Comment:   Plan: fluticasone (FLONASE) 50 MCG/ACT nasal spray,         predniSONE (DELTASONE) 20 MG tablet            Follow up with Dr. Springer and or Pulmonology.      TO EMERGENCY DEPARTMENT should symptoms persist or worsen.        At the end of the encounter, I discussed results, diagnosis, medications. Discussed red flags for immediate return to clinic/ER, as well as indications for follow up if no improvement. Patient understood and agreed to plan. Patient was stable for discharge           SUBJECTIVE:   Aylin Harding is a 63 year old female who presents today with persistent cough since 4/1/2024.  She tested positive for COVID and was treated with Paxlovid, but the cough persists.  She complains of chest discomfort with cough, and has also been wheezing, with shortness of breath.    Past medical history is significant for bronchiectasis.  She was hospitalized in December 2023 for acute respiratory failure.    She typically takes antibiotics and prednisone when she has exacerbations.  She was prescribed Zithromax by her pulmonologist, but she is currently taking hydroxychloroquine and it is contraindicated to take it with Zithromax.    She took 20 mg of prednisone yesterday as well, with little relief.        Patient Active  Problem List   Diagnosis    Bronchiectasis (H)    CARDIOVASCULAR SCREENING; LDL GOAL LESS THAN 160    Osteoporosis    Uncomplicated asthma    Right-sided low back pain without sciatica    Bronchiectasis with acute exacerbation (H)    Pulmonary infiltrate    Acute respiratory failure with hypoxia (H)         Past Medical History:   Diagnosis Date    Asthma     Bronchiectasis 9/5/08    Hormone replacement therapy 2011    Osteoporosis 2001    followed by PCP (Dr. Springer)    Pneumonia, organism unspecified(486)     Uncomplicated asthma          Current Outpatient Medications   Medication Sig Dispense Refill    Multiple Vitamins-Iron (DAILY-ALONDRA/IRON/BETA-CAROTENE) TABS TAKE 1 TABLET BY MOUTH DAILY. (Patient not taking: Reported on 10/19/2020) 30 tablet 7     Social History     Tobacco Use    Smoking status: Never Smoker    Smokeless tobacco: Never Used   Substance Use Topics    Alcohol use: Not on file     Family History   Problem Relation Age of Onset    Diabetes Mother     Diabetes Father          ROS:    10 point ROS of systems including Constitutional, Eyes, Respiratory, Cardiovascular, Gastroenterology, Genitourinary, Integumentary, Muscularskeletal, Psychiatric ,neurological were all negative except for pertinent positives noted in my HPI       OBJECTIVE:  /79 (BP Location: Left arm, Patient Position: Sitting, Cuff Size: Adult Regular)   Pulse 77   Temp 97.8  F (36.6  C) (Tympanic)   Resp 14   LMP  (LMP Unknown)   SpO2 96%   Physical Exam:  GENERAL APPEARANCE: healthy, alert and in mild distress secondary to wheezing  EYES: EOMI,  PERRL, conjunctiva clear  HENT: ear canals and TM's normal.  Nose with boggy turbinates and mouth without ulcers, erythema or lesions  RESP: Inspiratory and expiratory wheezes throughout  CV: regular rates and rhythm, normal S1 S2, no murmur noted  NEURO: Normal strength and tone, sensory exam grossly normal,  normal speech and mentation  SKIN: no suspicious lesions or  rashes    X-Ray was done, my findings are: No significant change since last imaging.     Results for orders placed or performed in visit on 04/10/24   XR Chest 2 Views     Status: None    Narrative    XR CHEST 2 VIEWS 4/10/2024 2:52 PM    HISTORY: pt tested positive for Covid 4/1.  H/o bronchectasis, took  paxlovid.  Cough persists.; Persistent cough    COMPARISON: 1/16/2024      Impression    IMPRESSION: Again there is bronchiectasis predominantly in the right  middle lobe, lingula and right lower lobe. Mild superimposed patchy  opacity in the left midlung and right lung base, similar to 1/16/2024,  likely due to superimposed atelectasis/inflammation and bronchial  mucous plugging. No consolidation, pleural effusion or pneumothorax.  Normal heart size.    OCTAVIO PEDROZA MD         SYSTEM ID:  [id number]

## 2024-04-10 NOTE — PATIENT INSTRUCTIONS
(J47.1) Bronchiectasis with acute exacerbation (H)  (primary encounter diagnosis)  Comment:   Plan: doxycycline hyclate (VIBRAMYCIN) 100 MG         capsule, predniSONE (DELTASONE) 20 MG tablet            (R05.3) Persistent cough  Comment:   Plan: XR Chest 2 Views, doxycycline hyclate         (VIBRAMYCIN) 100 MG capsule, predniSONE         (DELTASONE) 20 MG tablet            (J30.9) Allergic rhinitis, unspecified seasonality, unspecified trigger  Comment:   Plan: fluticasone (FLONASE) 50 MCG/ACT nasal spray,         predniSONE (DELTASONE) 20 MG tablet            Follow up with Dr. Springer and or Pulmonology.      TO EMERGENCY DEPARTMENT should symptoms persist or worsen.

## 2024-05-26 ENCOUNTER — HEALTH MAINTENANCE LETTER (OUTPATIENT)
Age: 64
End: 2024-05-26

## 2024-06-06 ENCOUNTER — MYC REFILL (OUTPATIENT)
Dept: OTHER | Facility: CLINIC | Age: 64
End: 2024-06-06
Payer: COMMERCIAL

## 2024-06-06 DIAGNOSIS — J47.9 BRONCHIECTASIS WITHOUT COMPLICATION (H): ICD-10-CM

## 2024-06-06 RX ORDER — HYDROXYCHLOROQUINE SULFATE 200 MG/1
200 TABLET, FILM COATED ORAL DAILY
Qty: 360 TABLET | Refills: 0 | Status: SHIPPED | OUTPATIENT
Start: 2024-06-06

## 2024-06-06 RX ORDER — HYDROXYCHLOROQUINE SULFATE 200 MG/1
200 TABLET, FILM COATED ORAL DAILY
Qty: 90 TABLET | Refills: 3 | OUTPATIENT
Start: 2024-06-06

## 2024-08-04 ENCOUNTER — HEALTH MAINTENANCE LETTER (OUTPATIENT)
Age: 64
End: 2024-08-04

## 2024-10-10 ENCOUNTER — IMMUNIZATION (OUTPATIENT)
Dept: INTERNAL MEDICINE | Facility: CLINIC | Age: 64
End: 2024-10-10
Payer: COMMERCIAL

## 2024-10-10 PROCEDURE — 90471 IMMUNIZATION ADMIN: CPT

## 2024-10-10 PROCEDURE — 90677 PCV20 VACCINE IM: CPT

## 2024-10-10 PROCEDURE — 90472 IMMUNIZATION ADMIN EACH ADD: CPT

## 2024-10-10 PROCEDURE — 90662 IIV NO PRSV INCREASED AG IM: CPT

## 2024-10-13 ENCOUNTER — HEALTH MAINTENANCE LETTER (OUTPATIENT)
Age: 64
End: 2024-10-13

## 2024-10-16 ENCOUNTER — TELEPHONE (OUTPATIENT)
Facility: CLINIC | Age: 64
End: 2024-10-16
Payer: COMMERCIAL

## 2024-10-16 DIAGNOSIS — J47.0 BRONCHIECTASIS WITH ACUTE LOWER RESPIRATORY INFECTION (H): Primary | ICD-10-CM

## 2024-10-16 NOTE — TELEPHONE ENCOUNTER
[facility] Call Center    Phone Message    May a detailed message be left on voicemail: yes     Reason for Call: Appointment Intake    Referring Provider Name: N/A (self-referral, MARIELY from [facility])  Diagnosis and/or Symptoms: Bronchiectasis    Pt scheduled to establish care 2/17/25 with christie Jorgensen orders placed for PFT's (pended).     Action Taken: Other: Pulm    Travel Screening: Not Applicable

## 2024-10-20 ENCOUNTER — OFFICE VISIT (OUTPATIENT)
Dept: URGENT CARE | Facility: URGENT CARE | Age: 64
End: 2024-10-20
Payer: COMMERCIAL

## 2024-10-20 VITALS
OXYGEN SATURATION: 93 % | SYSTOLIC BLOOD PRESSURE: 106 MMHG | HEART RATE: 107 BPM | TEMPERATURE: 99.1 F | DIASTOLIC BLOOD PRESSURE: 67 MMHG

## 2024-10-20 DIAGNOSIS — R05.3 PERSISTENT COUGH: ICD-10-CM

## 2024-10-20 DIAGNOSIS — J30.9 ALLERGIC RHINITIS, UNSPECIFIED SEASONALITY, UNSPECIFIED TRIGGER: ICD-10-CM

## 2024-10-20 DIAGNOSIS — J47.1 BRONCHIECTASIS WITH ACUTE EXACERBATION (H): Primary | ICD-10-CM

## 2024-10-20 PROCEDURE — 99214 OFFICE O/P EST MOD 30 MIN: CPT | Performed by: PHYSICIAN ASSISTANT

## 2024-10-20 RX ORDER — PREDNISONE 20 MG/1
20 TABLET ORAL DAILY
Qty: 5 TABLET | Refills: 1 | Status: SHIPPED | OUTPATIENT
Start: 2024-10-20

## 2024-10-20 RX ORDER — DOXYCYCLINE 100 MG/1
100 CAPSULE ORAL 2 TIMES DAILY
Qty: 20 CAPSULE | Refills: 1 | Status: SHIPPED | OUTPATIENT
Start: 2024-10-20

## 2024-10-20 RX ORDER — PREDNISONE 10 MG/1
TABLET ORAL
COMMUNITY
Start: 2024-07-24

## 2024-10-20 NOTE — PATIENT INSTRUCTIONS
(J47.1) Bronchiectasis with acute exacerbation (H)  (primary encounter diagnosis)  Comment:   Plan: predniSONE (DELTASONE) 20 MG tablet,         doxycycline hyclate (VIBRAMYCIN) 100 MG capsule            (R05.3) Persistent cough  Comment:   Plan: predniSONE (DELTASONE) 20 MG tablet,         doxycycline hyclate (VIBRAMYCIN) 100 MG capsule            (J30.9) Allergic rhinitis, unspecified seasonality, unspecified trigger  Comment:   Plan: predniSONE (DELTASONE) 20 MG tablet        Stay on cetirizine.      One refill of Doxycycline and Prednisone given as patient is in between pulmonologists.

## 2024-10-20 NOTE — PROGRESS NOTES
Patient presents with:  Breathing Problem: Shortness of breath last couple weeks    (J47.1) Bronchiectasis with acute exacerbation (H)  (primary encounter diagnosis)  Comment:   Plan: predniSONE (DELTASONE) 20 MG tablet,         doxycycline hyclate (VIBRAMYCIN) 100 MG capsule            (R05.3) Persistent cough  Comment:   Plan: predniSONE (DELTASONE) 20 MG tablet,         doxycycline hyclate (VIBRAMYCIN) 100 MG capsule            (J30.9) Allergic rhinitis, unspecified seasonality, unspecified trigger  Comment:   Plan: predniSONE (DELTASONE) 20 MG tablet        Stay on cetirizine.      One refill of Doxycycline and Prednisone given as patient is in between pulmonologists.      At the end of the encounter, I discussed results, diagnosis, medications. Discussed red flags for immediate return to clinic/ER, as well as indications for follow up if no improvement. Patient understood and agreed to plan. Patient was stable for discharge     If not improving or if condition worsens, follow up with your Primary Care Provider          SUBJECTIVE:   Aylin Harding is a 64 year old female who presents today with persistent cough and shortness of breath for 2 weeks.      Has been taking dayquil for her symptoms, also uses nebs intermittently.   Did take one prednisone and one zithromax today.        Will be following with a new pulmonologist in February 2025.  Her pulmonologist had in the past typically given her an emergency prescription to keep at home for an antibiotic and prednisone.        Patient Active Problem List   Diagnosis    Bronchiectasis (H)    CARDIOVASCULAR SCREENING; LDL GOAL LESS THAN 160    Osteoporosis    Uncomplicated asthma    Right-sided low back pain without sciatica    Bronchiectasis with acute exacerbation (H)    Pulmonary infiltrate    Acute respiratory failure with hypoxia (H)         Past Medical History:   Diagnosis Date    Asthma     Bronchiectasis 9/5/08    Hormone replacement therapy 2011     Osteoporosis 2001    followed by PCP (Dr. Springer)    Pneumonia, organism unspecified(486)     Uncomplicated asthma          Current Outpatient Medications   Medication Sig Dispense Refill    Multiple Vitamins-Iron (DAILY-ALONDRA/IRON/BETA-CAROTENE) TABS TAKE 1 TABLET BY MOUTH DAILY. (Patient not taking: Reported on 10/19/2020) 30 tablet 7     Social History     Tobacco Use    Smoking status: Never Smoker    Smokeless tobacco: Never Used   Substance Use Topics    Alcohol use: Not on file     Family History   Problem Relation Age of Onset    Diabetes Mother     Diabetes Father          ROS:    10 point ROS of systems including Constitutional, Eyes, Respiratory, Cardiovascular, Gastroenterology, Genitourinary, Integumentary, Muscularskeletal, Psychiatric ,neurological were all negative except for pertinent positives noted in my HPI       OBJECTIVE:  /67   Pulse 107   Temp 99.1  F (37.3  C) (Tympanic)   LMP  (LMP Unknown)   SpO2 93%   Physical Exam:  GENERAL APPEARANCE: healthy, alert and mild distress secondary to shortness of breath.  EYES: EOMI,  PERRL, conjunctiva clear  HENT: ear canals and TM's normal.  Nose with boggy blue turbinates and clear coryza and mouth without ulcers, erythema or lesions  NECK: supple, nontender, no lymphadenopathy  RESP: Inspiratory and expiratory wheezes throughout.  CV: regular rates and rhythm, normal S1 S2, no murmur noted  SKIN: no suspicious lesions or rashes

## 2024-12-29 ENCOUNTER — OFFICE VISIT (OUTPATIENT)
Dept: URGENT CARE | Facility: URGENT CARE | Age: 64
End: 2024-12-29
Payer: COMMERCIAL

## 2024-12-29 VITALS
SYSTOLIC BLOOD PRESSURE: 117 MMHG | HEART RATE: 90 BPM | RESPIRATION RATE: 17 BRPM | OXYGEN SATURATION: 97 % | BODY MASS INDEX: 25.99 KG/M2 | WEIGHT: 176 LBS | DIASTOLIC BLOOD PRESSURE: 78 MMHG | TEMPERATURE: 98.7 F

## 2024-12-29 DIAGNOSIS — J22 LOWER RESPIRATORY INFECTION: ICD-10-CM

## 2024-12-29 DIAGNOSIS — R06.2 WHEEZING: ICD-10-CM

## 2024-12-29 DIAGNOSIS — D84.9 IMMUNOSUPPRESSION (H): Primary | ICD-10-CM

## 2024-12-29 PROCEDURE — 99214 OFFICE O/P EST MOD 30 MIN: CPT | Performed by: PHYSICIAN ASSISTANT

## 2024-12-29 RX ORDER — PREDNISONE 20 MG/1
TABLET ORAL
Qty: 20 TABLET | Refills: 0 | Status: SHIPPED | OUTPATIENT
Start: 2024-12-29

## 2024-12-29 ASSESSMENT — ENCOUNTER SYMPTOMS
HEADACHES: 1
RHINORRHEA: 1
SORE THROAT: 0
SHORTNESS OF BREATH: 1
MYALGIAS: 0
COUGH: 1
FATIGUE: 1
FEVER: 0

## 2024-12-29 NOTE — PROGRESS NOTES
SUBJECTIVE:   Aylin Harding is a 64 year old female presenting with a chief complaint of   Chief Complaint   Patient presents with    Breathing Problem     Ongoing SOB, cough and wheezing for the last month.        She is an established patient of [facility]. Patient presents with coughing and SOB for months.  Appointment with pulmonologist in Feb.  Hx of bronchiectasis.      Treatment:  prednisone 20 mg last night; mucinex and dayquil.      Review of Systems   Constitutional:  Positive for fatigue. Negative for fever.   HENT:  Positive for congestion and rhinorrhea. Negative for ear pain and sore throat.    Respiratory:  Positive for cough and shortness of breath.    Musculoskeletal:  Negative for myalgias.   Neurological:  Positive for headaches.   All other systems reviewed and are negative.      Past Medical History:   Diagnosis Date    Asthma     Bronchiectasis 9/5/08    Hormone replacement therapy 2011    Osteoporosis 2001    followed by PCP (Dr. Springer)    Pneumonia, organism unspecified(486)     Uncomplicated asthma      Family History   Problem Relation Age of Onset    Arthritis Mother         b:1937    Cancer Mother     Arthritis Father         b:1934    Family History Negative Sister         2 sisters b:1958,1962    Family History Negative Brother         2 brothers b:1960,1963    No Known Problems Maternal Grandmother     No Known Problems Maternal Grandfather     No Known Problems Paternal Grandmother     No Known Problems Other     No Known Problems Brother     No Known Problems Sister      Current Outpatient Medications   Medication Sig Dispense Refill    amoxicillin-clavulanate (AUGMENTIN) 875-125 MG tablet Take 1 tablet by mouth 2 times daily for 7 days. 14 tablet 0    cetirizine (ZYRTEC) 10 MG tablet Take 10 mg by mouth daily      fluticasone (FLONASE) 50 MCG/ACT nasal spray Spray 1 spray into both nostrils daily 18.2 mL 5    Fluticasone-Umeclidin-Vilanterol (TRELEGY ELLIPTA) 200-62.5-25  MCG/ACT oral inhaler Inhale 1 puff into the lungs daily      hydroxychloroquine (PLAQUENIL) 200 MG tablet TAKE 1 TABLET (200 MG) BY MOUTH DAILY 360 tablet 0    predniSONE (DELTASONE) 10 MG tablet Take 40mg (4 tabs) daily x3 days --> 30mg (3 tabs) daily x3 days --> 20mg (2 tabs) daily x3 days --> 10mg (1 tab) daily x3 days --> stop.      predniSONE (DELTASONE) 20 MG tablet Take 3 tabs by mouth daily x 3 days, then 2 tabs daily x 3 days, then 1 tab daily x 3 days, then 1/2 tab daily x 3 days. 20 tablet 0    predniSONE (DELTASONE) 20 MG tablet Take 1 tablet (20 mg) by mouth daily. 5 tablet 1    Vitamin D3 (VITAMIN D, CHOLECALCIFEROL,) 25 mcg (1000 units) tablet Take 1 tablet by mouth daily      zinc sulfate (ZINCATE) 220 (50 Zn) MG capsule Take 220 mg by mouth daily      albuterol (PROAIR HFA/PROVENTIL HFA/VENTOLIN HFA) 108 (90 Base) MCG/ACT inhaler Inhale 2 puffs into the lungs every 6 hours 18 g 3    azithromycin (ZITHROMAX) 500 MG tablet Take one tablet by mouth daily for 7 days.  Take in case of exacerbation.  Stop hydroxychloroquine while taking this. (Patient not taking: Reported on 12/29/2024) 7 tablet 0    doxycycline hyclate (VIBRAMYCIN) 100 MG capsule Take 1 capsule (100 mg) by mouth 2 times daily. (Patient not taking: Reported on 12/29/2024) 20 capsule 1     Social History     Tobacco Use    Smoking status: Never    Smokeless tobacco: Never   Substance Use Topics    Alcohol use: Not Currently     Comment: rare socially       OBJECTIVE  /78 (BP Location: Left arm, Patient Position: Sitting, Cuff Size: Adult Regular)   Pulse 90   Temp 98.7  F (37.1  C) (Oral)   Resp 17   Wt 79.8 kg (176 lb)   LMP  (LMP Unknown)   SpO2 97%   BMI 25.99 kg/m      Physical Exam  Vitals and nursing note reviewed.   Constitutional:       Appearance: Normal appearance. She is normal weight.   HENT:      Head: Normocephalic and atraumatic.      Right Ear: Tympanic membrane, ear canal and external ear normal.      Left  Ear: Tympanic membrane, ear canal and external ear normal.      Nose: Nose normal.      Mouth/Throat:      Mouth: Mucous membranes are moist.      Pharynx: Oropharynx is clear.   Eyes:      Extraocular Movements: Extraocular movements intact.      Conjunctiva/sclera: Conjunctivae normal.   Cardiovascular:      Rate and Rhythm: Normal rate and regular rhythm.      Pulses: Normal pulses.      Heart sounds: Normal heart sounds.   Pulmonary:      Effort: Pulmonary effort is normal.      Breath sounds: Wheezing and rhonchi present.   Musculoskeletal:      Cervical back: Normal range of motion.   Skin:     General: Skin is warm and dry.      Findings: No rash.   Neurological:      General: No focal deficit present.      Mental Status: She is alert.   Psychiatric:         Mood and Affect: Mood normal.         Behavior: Behavior normal.         Labs:  No results found for this or any previous visit (from the past 24 hours).      ASSESSMENT:      ICD-10-CM    1. Immunosuppression (H)  D84.9       2. Lower respiratory infection  J22 predniSONE (DELTASONE) 20 MG tablet     amoxicillin-clavulanate (AUGMENTIN) 875-125 MG tablet           Medical Decision Making:    Differential Diagnosis:  URI Adult/Peds:  Bronchitis-viral and Pneumonia    Serious Comorbid Conditions:  Adult:   reviewed    PLAN:    Rx for augmentin and prednisone.   Discussed reasons to seek immediate medical attention.  Additionally if no improvement or worsening in one week, may follow up with PCP and/or UC.        Followup:    If not improving or if condition worsens, follow up with your Primary Care Provider, If not improving or if conditions worsens over the next 12-24 hours, go to the Emergency Department    There are no Patient Instructions on file for this visit.

## 2025-01-19 ENCOUNTER — OFFICE VISIT (OUTPATIENT)
Dept: URGENT CARE | Facility: URGENT CARE | Age: 65
End: 2025-01-19
Payer: COMMERCIAL

## 2025-01-19 VITALS
OXYGEN SATURATION: 93 % | HEART RATE: 114 BPM | WEIGHT: 176 LBS | TEMPERATURE: 99.1 F | DIASTOLIC BLOOD PRESSURE: 78 MMHG | SYSTOLIC BLOOD PRESSURE: 142 MMHG | BODY MASS INDEX: 25.99 KG/M2 | RESPIRATION RATE: 22 BRPM

## 2025-01-19 DIAGNOSIS — R05.1 ACUTE COUGH: ICD-10-CM

## 2025-01-19 DIAGNOSIS — R06.2 WHEEZE: ICD-10-CM

## 2025-01-19 DIAGNOSIS — J47.1 BRONCHIECTASIS WITH ACUTE EXACERBATION (H): ICD-10-CM

## 2025-01-19 DIAGNOSIS — R06.02 SOB (SHORTNESS OF BREATH): Primary | ICD-10-CM

## 2025-01-19 PROCEDURE — 99214 OFFICE O/P EST MOD 30 MIN: CPT | Mod: 25 | Performed by: FAMILY MEDICINE

## 2025-01-19 PROCEDURE — 94640 AIRWAY INHALATION TREATMENT: CPT | Performed by: FAMILY MEDICINE

## 2025-01-19 RX ORDER — IPRATROPIUM BROMIDE AND ALBUTEROL SULFATE 2.5; .5 MG/3ML; MG/3ML
3 SOLUTION RESPIRATORY (INHALATION) ONCE
Status: COMPLETED | OUTPATIENT
Start: 2025-01-19 | End: 2025-01-19

## 2025-01-19 RX ORDER — PREDNISONE 20 MG/1
TABLET ORAL
Qty: 17 TABLET | Refills: 0 | Status: SHIPPED | OUTPATIENT
Start: 2025-01-19 | End: 2025-01-31

## 2025-01-19 RX ADMIN — IPRATROPIUM BROMIDE AND ALBUTEROL SULFATE 3 ML: 2.5; .5 SOLUTION RESPIRATORY (INHALATION) at 16:46

## 2025-01-19 NOTE — PROGRESS NOTES
SUBJECTIVE: Aylin Harding is a 64 year old female presenting with a chief complaint of cough  and SOB wheezing.  Onset of symptoms was day(s) ago.  Course of illness is worsening after finishing steroids.    Predisposing factors include HX of Bronchiectasis.    Past Medical History:   Diagnosis Date    Asthma     Bronchiectasis 9/5/08    Hormone replacement therapy 2011    Osteoporosis 2001    followed by PCP (Dr. Springer)    Pneumonia, organism unspecified(486)     Uncomplicated asthma      Allergies   Allergen Reactions    Mold      Social History     Tobacco Use    Smoking status: Never    Smokeless tobacco: Never   Substance Use Topics    Alcohol use: Not Currently     Comment: rare socially       ROS:  SKIN: no rash  GI: no vomiting    OBJECTIVE:  BP (!) 142/78   Pulse 114   Temp 99.1  F (37.3  C)   Resp 22   Wt 79.8 kg (176 lb)   LMP  (LMP Unknown)   SpO2 93%   BMI 25.99 kg/m  GENERAL APPEARANCE: healthy, alert and no distress  EYES: EOMI,  PERRL, conjunctiva clear  HENT: ear canals and TM's normal.  Nose and mouth without ulcers, erythema or lesions  RESP: expiratory wheezes throughout  SKIN: no suspicious lesions or rashes    O2 sat 96% after neb abd prednisone, less wheezing and feels better      ICD-10-CM    1. SOB (shortness of breath)  R06.02 ipratropium - albuterol 0.5 mg/2.5 mg/3 mL (DUONEB) neb solution 3 mL     predniSONE (DELTASONE) tablet 60 mg     predniSONE (DELTASONE) 20 MG tablet     XR Chest 2 Views      2. Acute cough  R05.1 ipratropium - albuterol 0.5 mg/2.5 mg/3 mL (DUONEB) neb solution 3 mL     predniSONE (DELTASONE) tablet 60 mg     predniSONE (DELTASONE) 20 MG tablet     XR Chest 2 Views      3. Wheeze  R06.2 ipratropium - albuterol 0.5 mg/2.5 mg/3 mL (DUONEB) neb solution 3 mL     predniSONE (DELTASONE) tablet 60 mg     predniSONE (DELTASONE) 20 MG tablet     XR Chest 2 Views      4. Bronchiectasis with acute exacerbation (H)  J47.1 predniSONE (DELTASONE) tablet 60 mg      predniSONE (DELTASONE) 20 MG tablet     XR Chest 2 Views        Pt has neb  Will do CXR tomorrow  Fluids/Rest, f/u if worse/not any better

## 2025-01-20 ENCOUNTER — ANCILLARY PROCEDURE (OUTPATIENT)
Dept: GENERAL RADIOLOGY | Facility: CLINIC | Age: 65
End: 2025-01-20
Attending: FAMILY MEDICINE
Payer: COMMERCIAL

## 2025-01-20 DIAGNOSIS — R06.2 WHEEZE: ICD-10-CM

## 2025-01-20 DIAGNOSIS — R06.02 SOB (SHORTNESS OF BREATH): ICD-10-CM

## 2025-01-20 DIAGNOSIS — R05.1 ACUTE COUGH: ICD-10-CM

## 2025-01-20 DIAGNOSIS — J47.1 BRONCHIECTASIS WITH ACUTE EXACERBATION (H): ICD-10-CM

## 2025-01-20 PROCEDURE — 71046 X-RAY EXAM CHEST 2 VIEWS: CPT | Mod: TC | Performed by: RADIOLOGY

## 2025-02-10 ENCOUNTER — HOSPITAL ENCOUNTER (INPATIENT)
Facility: CLINIC | Age: 65
LOS: 3 days | Discharge: HOME | DRG: 871 | End: 2025-02-13
Attending: EMERGENCY MEDICINE | Admitting: HOSPITALIST
Payer: COMMERCIAL

## 2025-02-10 ENCOUNTER — APPOINTMENT (OUTPATIENT)
Dept: GENERAL RADIOLOGY | Facility: CLINIC | Age: 65
DRG: 871 | End: 2025-02-10
Attending: EMERGENCY MEDICINE
Payer: COMMERCIAL

## 2025-02-10 DIAGNOSIS — J45.901 EXACERBATION OF ASTHMA, UNSPECIFIED ASTHMA SEVERITY, UNSPECIFIED WHETHER PERSISTENT: ICD-10-CM

## 2025-02-10 DIAGNOSIS — J96.01 ACUTE RESPIRATORY FAILURE WITH HYPOXIA (H): ICD-10-CM

## 2025-02-10 DIAGNOSIS — R93.89 ABNORMAL CHEST CT: ICD-10-CM

## 2025-02-10 DIAGNOSIS — J18.9 PNEUMONIA OF BOTH LUNGS DUE TO INFECTIOUS ORGANISM, UNSPECIFIED PART OF LUNG: ICD-10-CM

## 2025-02-10 DIAGNOSIS — J47.9 BRONCHIECTASIS WITHOUT COMPLICATION (H): ICD-10-CM

## 2025-02-10 DIAGNOSIS — J45.50 SEVERE PERSISTENT ASTHMA, UNSPECIFIED WHETHER COMPLICATED (H): ICD-10-CM

## 2025-02-10 DIAGNOSIS — J47.1 BRONCHIECTASIS WITH ACUTE EXACERBATION (H): ICD-10-CM

## 2025-02-10 LAB
ALBUMIN SERPL BCG-MCNC: 4.5 G/DL (ref 3.5–5.2)
ALP SERPL-CCNC: 92 U/L (ref 40–150)
ALT SERPL W P-5'-P-CCNC: 27 U/L (ref 0–50)
ANION GAP SERPL CALCULATED.3IONS-SCNC: 15 MMOL/L (ref 7–15)
AST SERPL W P-5'-P-CCNC: 21 U/L (ref 0–45)
BASE EXCESS BLDV CALC-SCNC: 2 MMOL/L (ref -3–3)
BASOPHILS # BLD AUTO: 0 10E3/UL (ref 0–0.2)
BASOPHILS NFR BLD AUTO: 0 %
BILIRUB SERPL-MCNC: 1.2 MG/DL
BUN SERPL-MCNC: 13 MG/DL (ref 8–23)
CALCIUM SERPL-MCNC: 9.4 MG/DL (ref 8.8–10.4)
CHLORIDE SERPL-SCNC: 101 MMOL/L (ref 98–107)
CREAT SERPL-MCNC: 0.88 MG/DL (ref 0.51–0.95)
D DIMER PPP FEU-MCNC: 0.37 UG/ML FEU (ref 0–0.5)
EGFRCR SERPLBLD CKD-EPI 2021: 73 ML/MIN/1.73M2
EOSINOPHIL # BLD AUTO: 0 10E3/UL (ref 0–0.7)
EOSINOPHIL NFR BLD AUTO: 0 %
ERYTHROCYTE [DISTWIDTH] IN BLOOD BY AUTOMATED COUNT: 13.2 % (ref 10–15)
FLUAV RNA SPEC QL NAA+PROBE: NEGATIVE
FLUBV RNA RESP QL NAA+PROBE: NEGATIVE
GLUCOSE SERPL-MCNC: 86 MG/DL (ref 70–99)
HCO3 BLDV-SCNC: 27 MMOL/L (ref 21–28)
HCO3 SERPL-SCNC: 25 MMOL/L (ref 22–29)
HCT VFR BLD AUTO: 45 % (ref 35–47)
HGB BLD-MCNC: 15.3 G/DL (ref 11.7–15.7)
HOLD SPECIMEN: NORMAL
IMM GRANULOCYTES # BLD: 0.1 10E3/UL
IMM GRANULOCYTES NFR BLD: 0 %
LACTATE BLD-SCNC: 2.8 MMOL/L
LACTATE SERPL-SCNC: 0.9 MMOL/L (ref 0.7–2)
LYMPHOCYTES # BLD AUTO: 1.3 10E3/UL (ref 0.8–5.3)
LYMPHOCYTES NFR BLD AUTO: 8 %
MCH RBC QN AUTO: 30.1 PG (ref 26.5–33)
MCHC RBC AUTO-ENTMCNC: 34 G/DL (ref 31.5–36.5)
MCV RBC AUTO: 89 FL (ref 78–100)
MONOCYTES # BLD AUTO: 0.5 10E3/UL (ref 0–1.3)
MONOCYTES NFR BLD AUTO: 3 %
NEUTROPHILS # BLD AUTO: 15.2 10E3/UL (ref 1.6–8.3)
NEUTROPHILS NFR BLD AUTO: 89 %
NRBC # BLD AUTO: 0 10E3/UL
NRBC BLD AUTO-RTO: 0 /100
PCO2 BLDV: 42 MM HG (ref 40–50)
PH BLDV: 7.41 [PH] (ref 7.32–7.43)
PLATELET # BLD AUTO: 184 10E3/UL (ref 150–450)
PO2 BLDV: 16 MM HG (ref 25–47)
POTASSIUM SERPL-SCNC: 3.5 MMOL/L (ref 3.4–5.3)
PROCALCITONIN SERPL IA-MCNC: 0.09 NG/ML
PROT SERPL-MCNC: 7.3 G/DL (ref 6.4–8.3)
RBC # BLD AUTO: 5.08 10E6/UL (ref 3.8–5.2)
RSV RNA SPEC NAA+PROBE: NEGATIVE
SAO2 % BLDV: 21 % (ref 70–75)
SARS-COV-2 RNA RESP QL NAA+PROBE: NEGATIVE
SODIUM SERPL-SCNC: 141 MMOL/L (ref 135–145)
TROPONIN T SERPL HS-MCNC: <6 NG/L
WBC # BLD AUTO: 17.1 10E3/UL (ref 4–11)

## 2025-02-10 PROCEDURE — 36415 COLL VENOUS BLD VENIPUNCTURE: CPT | Performed by: EMERGENCY MEDICINE

## 2025-02-10 PROCEDURE — 99285 EMERGENCY DEPT VISIT HI MDM: CPT | Mod: 25

## 2025-02-10 PROCEDURE — 84155 ASSAY OF PROTEIN SERUM: CPT | Performed by: EMERGENCY MEDICINE

## 2025-02-10 PROCEDURE — 96375 TX/PRO/DX INJ NEW DRUG ADDON: CPT

## 2025-02-10 PROCEDURE — 94640 AIRWAY INHALATION TREATMENT: CPT

## 2025-02-10 PROCEDURE — 120N000001 HC R&B MED SURG/OB

## 2025-02-10 PROCEDURE — 86431 RHEUMATOID FACTOR QUANT: CPT | Performed by: INTERNAL MEDICINE

## 2025-02-10 PROCEDURE — 86140 C-REACTIVE PROTEIN: CPT | Performed by: INTERNAL MEDICINE

## 2025-02-10 PROCEDURE — 87040 BLOOD CULTURE FOR BACTERIA: CPT | Performed by: EMERGENCY MEDICINE

## 2025-02-10 PROCEDURE — 250N000011 HC RX IP 250 OP 636: Mod: JZ | Performed by: EMERGENCY MEDICINE

## 2025-02-10 PROCEDURE — 87637 SARSCOV2&INF A&B&RSV AMP PRB: CPT | Performed by: EMERGENCY MEDICINE

## 2025-02-10 PROCEDURE — 84145 PROCALCITONIN (PCT): CPT | Performed by: HOSPITALIST

## 2025-02-10 PROCEDURE — 82803 BLOOD GASES ANY COMBINATION: CPT

## 2025-02-10 PROCEDURE — 85379 FIBRIN DEGRADATION QUANT: CPT | Performed by: EMERGENCY MEDICINE

## 2025-02-10 PROCEDURE — 99223 1ST HOSP IP/OBS HIGH 75: CPT | Performed by: HOSPITALIST

## 2025-02-10 PROCEDURE — 85004 AUTOMATED DIFF WBC COUNT: CPT | Performed by: EMERGENCY MEDICINE

## 2025-02-10 PROCEDURE — 36415 COLL VENOUS BLD VENIPUNCTURE: CPT

## 2025-02-10 PROCEDURE — 96365 THER/PROPH/DIAG IV INF INIT: CPT

## 2025-02-10 PROCEDURE — 250N000013 HC RX MED GY IP 250 OP 250 PS 637: Performed by: HOSPITALIST

## 2025-02-10 PROCEDURE — 96368 THER/DIAG CONCURRENT INF: CPT

## 2025-02-10 PROCEDURE — 87633 RESP VIRUS 12-25 TARGETS: CPT | Performed by: INTERNAL MEDICINE

## 2025-02-10 PROCEDURE — 250N000009 HC RX 250: Performed by: EMERGENCY MEDICINE

## 2025-02-10 PROCEDURE — 93005 ELECTROCARDIOGRAM TRACING: CPT

## 2025-02-10 PROCEDURE — 83605 ASSAY OF LACTIC ACID: CPT

## 2025-02-10 PROCEDURE — 71046 X-RAY EXAM CHEST 2 VIEWS: CPT

## 2025-02-10 PROCEDURE — 82565 ASSAY OF CREATININE: CPT | Performed by: EMERGENCY MEDICINE

## 2025-02-10 PROCEDURE — 250N000011 HC RX IP 250 OP 636: Performed by: HOSPITALIST

## 2025-02-10 PROCEDURE — 84484 ASSAY OF TROPONIN QUANT: CPT | Performed by: EMERGENCY MEDICINE

## 2025-02-10 PROCEDURE — 258N000003 HC RX IP 258 OP 636: Performed by: EMERGENCY MEDICINE

## 2025-02-10 PROCEDURE — 86200 CCP ANTIBODY: CPT | Performed by: INTERNAL MEDICINE

## 2025-02-10 RX ORDER — CALCIUM CARBONATE 500 MG/1
1000 TABLET, CHEWABLE ORAL 4 TIMES DAILY PRN
Status: DISCONTINUED | OUTPATIENT
Start: 2025-02-10 | End: 2025-02-13 | Stop reason: HOSPADM

## 2025-02-10 RX ORDER — CEFEPIME HYDROCHLORIDE 2 G/1
2 INJECTION, POWDER, FOR SOLUTION INTRAVENOUS ONCE
Status: COMPLETED | OUTPATIENT
Start: 2025-02-10 | End: 2025-02-10

## 2025-02-10 RX ORDER — ENOXAPARIN SODIUM 100 MG/ML
40 INJECTION SUBCUTANEOUS EVERY 24 HOURS
Status: DISCONTINUED | OUTPATIENT
Start: 2025-02-10 | End: 2025-02-13 | Stop reason: HOSPADM

## 2025-02-10 RX ORDER — ALBUTEROL SULFATE 90 UG/1
1-2 INHALANT RESPIRATORY (INHALATION) EVERY 6 HOURS PRN
COMMUNITY

## 2025-02-10 RX ORDER — DIPHENHYDRAMINE HCL 25 MG
25 CAPSULE ORAL AT BEDTIME
Status: DISCONTINUED | OUTPATIENT
Start: 2025-02-10 | End: 2025-02-13 | Stop reason: HOSPADM

## 2025-02-10 RX ORDER — FLUTICASONE FUROATE AND VILANTEROL 200; 25 UG/1; UG/1
1 POWDER RESPIRATORY (INHALATION) DAILY
Status: DISCONTINUED | OUTPATIENT
Start: 2025-02-11 | End: 2025-02-13 | Stop reason: HOSPADM

## 2025-02-10 RX ORDER — METHYLPREDNISOLONE SODIUM SUCCINATE 125 MG/2ML
125 INJECTION INTRAMUSCULAR; INTRAVENOUS ONCE
Status: COMPLETED | OUTPATIENT
Start: 2025-02-10 | End: 2025-02-10

## 2025-02-10 RX ORDER — IPRATROPIUM BROMIDE AND ALBUTEROL SULFATE 2.5; .5 MG/3ML; MG/3ML
1 SOLUTION RESPIRATORY (INHALATION) EVERY 6 HOURS PRN
COMMUNITY

## 2025-02-10 RX ORDER — AZITHROMYCIN MONOHYDRATE 500 MG/5ML
500 INJECTION, POWDER, LYOPHILIZED, FOR SOLUTION INTRAVENOUS ONCE
Status: COMPLETED | OUTPATIENT
Start: 2025-02-10 | End: 2025-02-10

## 2025-02-10 RX ORDER — ALBUTEROL SULFATE 0.83 MG/ML
2.5 SOLUTION RESPIRATORY (INHALATION)
Status: DISCONTINUED | OUTPATIENT
Start: 2025-02-10 | End: 2025-02-13 | Stop reason: HOSPADM

## 2025-02-10 RX ORDER — ONDANSETRON 2 MG/ML
4 INJECTION INTRAMUSCULAR; INTRAVENOUS EVERY 6 HOURS PRN
Status: DISCONTINUED | OUTPATIENT
Start: 2025-02-10 | End: 2025-02-13 | Stop reason: HOSPADM

## 2025-02-10 RX ORDER — IPRATROPIUM BROMIDE AND ALBUTEROL SULFATE 2.5; .5 MG/3ML; MG/3ML
3 SOLUTION RESPIRATORY (INHALATION) ONCE
Status: COMPLETED | OUTPATIENT
Start: 2025-02-10 | End: 2025-02-10

## 2025-02-10 RX ORDER — ACETAMINOPHEN 325 MG/1
650 TABLET ORAL EVERY 4 HOURS PRN
Status: DISCONTINUED | OUTPATIENT
Start: 2025-02-10 | End: 2025-02-13 | Stop reason: HOSPADM

## 2025-02-10 RX ORDER — PREDNISONE 20 MG/1
20 TABLET ORAL DAILY
Status: ON HOLD | COMMUNITY
End: 2025-02-13

## 2025-02-10 RX ORDER — AZITHROMYCIN MONOHYDRATE 500 MG/5ML
500 INJECTION, POWDER, LYOPHILIZED, FOR SOLUTION INTRAVENOUS EVERY 24 HOURS
Status: DISCONTINUED | OUTPATIENT
Start: 2025-02-11 | End: 2025-02-11

## 2025-02-10 RX ORDER — DIPHENHYDRAMINE HCL 25 MG
25 CAPSULE ORAL AT BEDTIME
COMMUNITY

## 2025-02-10 RX ORDER — CEFEPIME HYDROCHLORIDE 2 G/1
2 INJECTION, POWDER, FOR SOLUTION INTRAVENOUS EVERY 8 HOURS
Status: DISCONTINUED | OUTPATIENT
Start: 2025-02-10 | End: 2025-02-13 | Stop reason: HOSPADM

## 2025-02-10 RX ORDER — IPRATROPIUM BROMIDE AND ALBUTEROL SULFATE 2.5; .5 MG/3ML; MG/3ML
1 SOLUTION RESPIRATORY (INHALATION)
Status: DISCONTINUED | OUTPATIENT
Start: 2025-02-10 | End: 2025-02-11

## 2025-02-10 RX ORDER — AMOXICILLIN 250 MG
2 CAPSULE ORAL 2 TIMES DAILY
Status: DISCONTINUED | OUTPATIENT
Start: 2025-02-10 | End: 2025-02-13 | Stop reason: HOSPADM

## 2025-02-10 RX ORDER — POLYETHYLENE GLYCOL 3350 17 G/17G
17 POWDER, FOR SOLUTION ORAL 2 TIMES DAILY PRN
Status: DISCONTINUED | OUTPATIENT
Start: 2025-02-10 | End: 2025-02-13 | Stop reason: HOSPADM

## 2025-02-10 RX ORDER — CETIRIZINE HYDROCHLORIDE 10 MG/1
10 TABLET ORAL DAILY
Status: DISCONTINUED | OUTPATIENT
Start: 2025-02-11 | End: 2025-02-13 | Stop reason: HOSPADM

## 2025-02-10 RX ORDER — ONDANSETRON 4 MG/1
4 TABLET, ORALLY DISINTEGRATING ORAL EVERY 6 HOURS PRN
Status: DISCONTINUED | OUTPATIENT
Start: 2025-02-10 | End: 2025-02-13 | Stop reason: HOSPADM

## 2025-02-10 RX ORDER — LEVALBUTEROL INHALATION SOLUTION 1.25 MG/3ML
1.25 SOLUTION RESPIRATORY (INHALATION) ONCE
Status: COMPLETED | OUTPATIENT
Start: 2025-02-10 | End: 2025-02-10

## 2025-02-10 RX ORDER — AMOXICILLIN 250 MG
1 CAPSULE ORAL 2 TIMES DAILY
Status: DISCONTINUED | OUTPATIENT
Start: 2025-02-10 | End: 2025-02-13 | Stop reason: HOSPADM

## 2025-02-10 RX ORDER — ACETAMINOPHEN 650 MG/1
650 SUPPOSITORY RECTAL EVERY 4 HOURS PRN
Status: DISCONTINUED | OUTPATIENT
Start: 2025-02-10 | End: 2025-02-13 | Stop reason: HOSPADM

## 2025-02-10 RX ORDER — LIDOCAINE 40 MG/G
CREAM TOPICAL
Status: DISCONTINUED | OUTPATIENT
Start: 2025-02-10 | End: 2025-02-13 | Stop reason: HOSPADM

## 2025-02-10 RX ORDER — HYDROXYCHLOROQUINE SULFATE 200 MG/1
200 TABLET, FILM COATED ORAL DAILY
Status: DISCONTINUED | OUTPATIENT
Start: 2025-02-11 | End: 2025-02-13

## 2025-02-10 RX ORDER — METHYLPREDNISOLONE SODIUM SUCCINATE 125 MG/2ML
60 INJECTION INTRAMUSCULAR; INTRAVENOUS EVERY 12 HOURS
Status: DISCONTINUED | OUTPATIENT
Start: 2025-02-11 | End: 2025-02-13

## 2025-02-10 RX ADMIN — CEFEPIME 2 G: 2 INJECTION, POWDER, FOR SOLUTION INTRAVENOUS at 22:22

## 2025-02-10 RX ADMIN — LEVALBUTEROL HYDROCHLORIDE 1.25 MG: 1.25 SOLUTION RESPIRATORY (INHALATION) at 15:30

## 2025-02-10 RX ADMIN — AZITHROMYCIN MONOHYDRATE 500 MG: 500 INJECTION, POWDER, LYOPHILIZED, FOR SOLUTION INTRAVENOUS at 14:59

## 2025-02-10 RX ADMIN — CEFEPIME 2 G: 2 INJECTION, POWDER, FOR SOLUTION INTRAVENOUS at 14:23

## 2025-02-10 RX ADMIN — SENNOSIDES AND DOCUSATE SODIUM 1 TABLET: 50; 8.6 TABLET ORAL at 22:14

## 2025-02-10 RX ADMIN — METHYLPREDNISOLONE SODIUM SUCCINATE 125 MG: 125 INJECTION, POWDER, FOR SOLUTION INTRAMUSCULAR; INTRAVENOUS at 14:19

## 2025-02-10 RX ADMIN — ENOXAPARIN SODIUM 40 MG: 40 INJECTION SUBCUTANEOUS at 22:27

## 2025-02-10 RX ADMIN — IPRATROPIUM BROMIDE AND ALBUTEROL SULFATE 3 ML: .5; 3 SOLUTION RESPIRATORY (INHALATION) at 13:22

## 2025-02-10 RX ADMIN — SODIUM CHLORIDE 2394 ML: 9 INJECTION, SOLUTION INTRAVENOUS at 14:19

## 2025-02-10 ASSESSMENT — ACTIVITIES OF DAILY LIVING (ADL)
ADLS_ACUITY_SCORE: 46
ADLS_ACUITY_SCORE: 27
ADLS_ACUITY_SCORE: 46

## 2025-02-10 NOTE — ED PROVIDER NOTES
"  Emergency Department Note      History of Present Illness     Chief Complaint   Shortness of Breath      HPI   Aylin Harding is a 64 year old female with a history of acute hypoxic respiratory failure, asthma, bronchiectasis, and pneumonia who presents to the ED for evaluation of shortness of breath. The patient states she has had worsening baseline shortness of breath as well as episodes of acute respiratory distress and a productive cough for the past few months. Today at around 1915, she became suddenly fatigued and chilled so she laid down. At around 1245, she got up and became shortness of breath with a cough, headache, and right-sided chest pain. She noted her spO2 was 88% at that time, taken with a pulse oximeter at home. Her spO2 was 94% prior to lying down. States she has had wheezing, \"gurgling,\" and a worsening cough with lying down, particularly on her left side. She has been seen for these symptoms multiple times in the past and has been on multiple courses of antibiotics and prednisone without relief. She has also tried Dayquil and Multinex. Her last dose of prednisone was 20 mg 2 nights ago and her last course of antibiotics was about 1 month ago. She did have a similar presentation in December 2023 and was seen by Pulmonology at that time. She is not on supplemental oxygen at baseline but arrives on 2 LPM. She was with her nephew 4 days ago who had a cough. No other known sick exposures. Denies hemoptysis, recent surgery or travel, lower extremity edema, nausea, vomiting, abdominal pain, or sore throat.     Independent Historian   None    Review of External Notes   none    Past Medical History     Medical History and Problem List   Osteoporosis  Acute hypoxic respiratory failure  Asthma  Bronchiectasis  Pneumonia     Medications   Albuterol  Fluticasone  Trelegy  Plaquenil    Surgical History   Carpal tunnel release   Oral surgery     Physical Exam     Patient Vitals for the past 24 hrs:   " BP Temp Pulse Resp SpO2   02/10/25 1324 116/64 -- -- -- --   02/10/25 1318 -- 98.4  F (36.9  C) (!) 123 30 (!) 89 %     Physical Exam  Nursing note and vitals reviewed.  Constitutional:  Appears well-developed and well-nourished.   HENT:   Head:    Atraumatic.   Mouth/Throat:   Oropharynx is clear and moist. No oropharyngeal exudate.   Eyes:    Pupils are equal, round, and reactive to light.   Neck:    Normal range of motion. Neck supple.      No tracheal deviation present. No thyromegaly present.   Cardiovascular:  Tachycardic rate, regular rhythm, no murmur   Pulmonary/Chest: Breath sounds reveal bilateral crackles and few expiratory wheezes.  Abdominal:   Soft. Bowel sounds are normal. Exhibits no distension and      no mass. There is no tenderness.      There is no rebound and no guarding.   Musculoskeletal:  Exhibits no edema.   Lymphadenopathy:  No cervical adenopathy.   Neurological:   Alert and oriented to person, place, and time.   Skin:    Skin is warm and dry. No rash noted. No pallor.     Diagnostics     Lab Results   Labs Ordered and Resulted from Time of ED Arrival to Time of ED Departure   CBC WITH PLATELETS AND DIFFERENTIAL - Abnormal       Result Value    WBC Count 17.1 (*)     RBC Count 5.08      Hemoglobin 15.3      Hematocrit 45.0      MCV 89      MCH 30.1      MCHC 34.0      RDW 13.2      Platelet Count 184      % Neutrophils 89      % Lymphocytes 8      % Monocytes 3      % Eosinophils 0      % Basophils 0      % Immature Granulocytes 0      NRBCs per 100 WBC 0      Absolute Neutrophils 15.2 (*)     Absolute Lymphocytes 1.3      Absolute Monocytes 0.5      Absolute Eosinophils 0.0      Absolute Basophils 0.0      Absolute Immature Granulocytes 0.1      Absolute NRBCs 0.0     ISTAT GASES LACTATE VENOUS POCT - Abnormal    Lactic Acid POCT 2.8 (*)     Bicarbonate Venous POCT 27      O2 Sat, Venous POCT 21 (*)     pCO2 Venous POCT 42      pH Venous POCT 7.41      pO2 Venous POCT 16 (*)     Base  Excess/Deficit (+/-) POCT 2.0     INFLUENZA A/B, RSV AND SARS-COV2 PCR - Normal    Influenza A PCR Negative      Influenza B PCR Negative      RSV PCR Negative      SARS CoV2 PCR Negative     COMPREHENSIVE METABOLIC PANEL - Normal    Sodium 141      Potassium 3.5      Carbon Dioxide (CO2) 25      Anion Gap 15      Urea Nitrogen 13.0      Creatinine 0.88      GFR Estimate 73      Calcium 9.4      Chloride 101      Glucose 86      Alkaline Phosphatase 92      AST 21      ALT 27      Protein Total 7.3      Albumin 4.5      Bilirubin Total 1.2     D DIMER QUANTITATIVE - Normal    D-Dimer Quantitative 0.37     TROPONIN T, HIGH SENSITIVITY - Normal    Troponin T, High Sensitivity <6     PROCALCITONIN - Normal    Procalcitonin 0.09     LACTIC ACID WHOLE BLOOD   BLOOD CULTURE   BLOOD CULTURE       Imaging   Chest XR,  PA & LAT   Final Result   IMPRESSION: Enlarged cardiac silhouette. Prominence of the pulmonary vasculature. New bilateral interstitial opacities concerning for atypical pneumonia. Interstitial pulmonary edema could have a similar imaging appearance. Chronic lingular and right    middle lobe bronchiectasis and scarring. No pleural effusion or discernible pneumothorax.          EKG   ECG taken at 1330, ECG read at 1340  Sinus tachycardia  Nonspecific ST and T wave abnormality   Rate 118 bpm. KS interval 124 ms. QRS duration 76 ms. QT/QTc 314/440 ms. P-R-T axes 64 41 70.    EKG  ECG taken at 1736, ECG read at 1738  Sinus tachycardia   Rate 106 bpm. KS interval 144 ms. QRS duration 80 ms. QT/QTc 364/483 ms. P-R-T axes 64 32 56.     Independent Interpretation   CXR: Shows extensive bilateral infiltrates consistent with pneumonia.    ED Course      Medications Administered   Medications   ipratropium - albuterol 0.5 mg/2.5 mg/3 mL (DUONEB) neb solution 3 mL (3 mLs Nebulization $Given 2/10/25 1322)   methylPREDNISolone Na Suc (solu-MEDROL) injection 125 mg (125 mg Intravenous $Given 2/10/25 1419)   levalbuterol  (XOPENEX) neb solution 1.25 mg (1.25 mg Nebulization $Given 2/10/25 1530)   sodium chloride 0.9% BOLUS 2,394 mL (0 mLs Intravenous Stopped 2/10/25 1619)   ceFEPIme (MAXIPIME) 2 g vial to attach to  mL bag for ADULTS or NS 50 mL bag for PEDS (0 g Intravenous Stopped 2/10/25 1514)   azithromycin (ZITHROMAX) 500 mg vial to attach to  mL bag (0 mg Intravenous Stopped 2/10/25 1619)       Procedures   Procedures     Discussion of Management   Admitting Hospitalist, Dr. Mike Noel    ED Course   ED Course as of 02/10/25 1452   Mon Feb 10, 2025   1330 I obtained history and performed a physical exam as noted above.        Additional Documentation  None    Medical Decision Making / Diagnosis     CMS Diagnoses:   Sepsis ED evaluation   The patient has signs of  severe sepsis as evidenced by:  1. Presence of 2 SIRS criteria, suspected infection, AND  2. Organ dysfunction: Lactic Acidosis with value >2.0 due to sepsis    Sepsis Care Initiation: Starting at  13:44 PM on 02/10/25, until specified. Prior to this documentation, sepsis, severe sepsis, or septic shock was NOT thought to be a significant cause of illness. This order represents the first time infection was seriously considered to be affecting the patient.    Lactic Acid Results:  Recent Labs   Lab Test 02/10/25  1344 12/02/23  0932   LACT 2.8* 1.1       3 Hour Bundle 6 Hour Bundle (Reassessment)   Blood Cultures before IV Antibiotics: Yes  Antibiotics given: see below  Prehospital fluid volume (mL):                     Total fluids given (ED +Pre-hospital):  Full 30 mL/kg bolus given based on weight: 2,390 mL   Repeat Lactic Acid Level: Ordered by reflex for 2 hours after initial lactic acid collection.  Vasopressors: MAP>65 after initial IVF bolus, will continue to monitor fluid status and vital signs.  Repeat perfusion exam: I attest to having performed a repeat sepsis exam and assessment of perfusion at  3 PM.   BMI Readings from Last 1 Encounters:    01/19/25 25.99 kg/m        Anti-infectives (From admission through now)      Start     Dose/Rate Route Frequency Ordered Stop    02/10/25 1410  ceFEPIme (MAXIPIME) 2 g vial to attach to  mL bag for ADULTS or NS 50 mL bag for PEDS         2 g  over 30 Minutes Intravenous ONCE 02/10/25 1407 02/10/25 1514    02/10/25 1410  azithromycin (ZITHROMAX) 500 mg vial to attach to  mL bag         500 mg  over 1 Hours Intravenous ONCE 02/10/25 1407 02/10/25 1619                   MIPS       None    [facility]   Aylin Harding is a 64 year old female with a history of bronchiectasis and asthma who I found to have acute hypoxic respiratory failure and severe sepsis due to bilateral pneumonia consistent with atypical pneumonia.  She was treated with broad-spectrum antibiotics of cefepime and azithromycin to cover for atypical pneumonia and Pseudomonas because she is high risk due to her history of bronchiectasis.  She was initiated on steroid treatment with IV Solu-Medrol and given nebulizer treatments and supplemental oxygen with improvement of her condition.  D-dimer is normal and I do not feel there is any pulmonary embolism.  ECG shows a normal sinus rhythm without any cardiac arrhythmia and troponin is normal.  I do not feel that there was any sign of myocardial infarction.  COVID-19, influenza and RSV testing are negative.    Disposition   The patient was admitted to the hospital.     Diagnosis     ICD-10-CM    1. Acute respiratory failure with hypoxia (H)  J96.01       2. Pneumonia of both lungs due to infectious organism, unspecified part of lung  J18.9       3. Exacerbation of asthma, unspecified asthma severity, unspecified whether persistent  J45.901       4. Bronchiectasis with acute exacerbation (H)  J47.1            Discharge Medications   New Prescriptions    No medications on file         Scribe Disclosure:  CRYSTAL, Sahra Ventura, am serving as a scribe at 1:31 PM on 2/10/2025 to document services personally  performed by Ileana Lieberman MD based on my observations and the provider's statements to me.        Ileana Lieberman MD  02/10/25 1940

## 2025-02-10 NOTE — ED TRIAGE NOTES
Pt arrives in distress, hyperventialating, spouse stating her blood gases are dropping.  Pt has been spot checking O2 sats last 1-2 months d/t resp infection.  States was sitting at computer working and suddenly felt increased SOB.  When checking pulse o2 her sats were 88%.       Triage Assessment (Adult)       Row Name 02/10/25 1324          Triage Assessment    Airway WDL WDL        Respiratory WDL    Respiratory WDL X        Cardiac WDL    Cardiac WDL X

## 2025-02-10 NOTE — H&P
[facility]    History and Physical - Hospitalist Service       Date of Admission:  2/10/2025    Assessment & Plan      Aylin Harding is a 64 year old female with history of asthma, bronchiectasis, prior pneumonia and hypoxia hospitalization Dec 2023, and osteoporosis among others who presented to the ED with worsening shortness of breath and hypoxia.  Work up is notable for leukocytosis, LA 2.8, and CXR with bilateral interstitial opacities concerning for atypical pneumonia. She has been started on supplemental oxygen, nebs, steroid, and azithromycin and cefepime.         Concern of atypical pneumonia  Acute hypoxic respiratory failure  Elevated lactic acid  History of asthma and bronchiectasis, acute exacerbation  Multiple bouts of cold symptoms in recent months.  Has been on antibiotics and steroids as well as over-the-counter cold medicines without much improvement.  Noted worsening shortness of breath, fatigue, chills, cough, and SpO2 80% on room air at home.  Recent sick contact with her nephew a few days prior to admission. Admitted for similar scenario December 2023.   *CXR bilateral interstitial opacities possibly congruent with atypical pneumonia, see full read for further details  *vbg pH 7.41, PCO2 42, LA 2.8 but AG normal at 15, HCO3 normal at 25  *D Dimer unremarkable at 0.37  *Leukocytosis of 17K, ANC elevated at 15  *Initial vitals temp 98.4, heart rate 100-1 20, BP 110s/60s, RR 20s, O2 sat 86% RA, improved to 92% on 2L NC  *Blood cultures pending  *troponin negative  *Flu A/B, RSV, COVID negative  - admit to inpatient  - continue supplemental oxygen, goal >90%  - empiric azithro and cefepime to continue for now  - solumedrol 125 in ED, continue 60 bid for now  - scheduled and prn nebs  - PTA trelegy  - IS  - prn guaifenesin    History of seasonal allergies      Diet:  reg  DVT Prophylaxis: Enoxaparin (Lovenox) SQ  Springer Catheter: Not present  Lines: None     Cardiac  "Monitoring: None  Code Status:  Full Code confirmed upon admission    Clinically Significant Risk Factors Present on Admission                                 # Asthma: noted on problem list        Disposition Plan     Medically Ready for Discharge: Anticipated in 2-4 Days           Mike Noel MD  Hospitalist Service  [facility]  Securely message with [employer] (more info)  Text page via [facility] Paging/Directory     ______________________________________________________________________    Chief Complaint   SOB, chills, fatigue and \"my oxygen was down in the 80s\"    History is obtained from the patient    History of Present Illness   Aylin Harding is a 64 year old female with history of asthma, bronchiectasis, prior pneumonia and hypoxia hospitalization Dec 2023, and osteoporosis among others who presented to the ED with worsening shortness of breath and hypoxia.  Patient reports 1 to 2 months of difficulty breathing and productive cough but while at work earlier today she noted somewhat sudden fatigue and chills and subsequently laid down.  Upon getting up few hours later she was much more short of breath and had cough, headache, and right-sided chest pain - particularly when coughing. She denies chest pain or pressure now.  Her SpO2 was down to 88% on her home oximeter. She also notes wheezing. She has had multiple bouts of this in recent months and reports antibiotic courses and prednisone without much improvement.  She also tried some over-the-counter cold medicines without much improvement.  She did take 20 mg of prednisone 2 days ago which was remaining from a prior prescription.  Of note she does not use home oxygen.  Report of a nephew who may have had a cold in the past week otherwise no sick exposures or travel.  Patient denies new lower extremity edema, abdominal pain, nausea, vomiting, diarrhea, or urinary changes. She previously followed with [facility] but reports having " an upcoming clinic visit with [facility].  Work up is notable for leukocytosis, LA 2.8, and CXR with bilateral interstitial opacities concerning for atypical pneumonia. She has been started on supplemental oxygen, nebs, steroid, and azithromycin and cefepime.       Past Medical History    Past Medical History:   Diagnosis Date    Asthma     Bronchiectasis 9/5/08    Hormone replacement therapy 2011    Osteoporosis 2001    followed by PCP (Dr. Springer)    Pneumonia, organism unspecified(486)     Uncomplicated asthma        Past Surgical History   Past Surgical History:   Procedure Laterality Date    CARPAL TUNNEL RELEASE RT/LT  2009    [facility] ORAL SURGERY PROCEDURE         Prior to Admission Medications   Prior to Admission Medications   Prescriptions Last Dose Informant Patient Reported? Taking?   Fluticasone-Umeclidin-Vilanterol (TRELEGY ELLIPTA) 200-62.5-25 MCG/ACT oral inhaler 2/9/2025 Self, Pharmacy Yes Yes   Sig: Inhale 1 puff into the lungs daily   Pseudoephedrine-DM-GG-APAP (DAYQUIL LIQUICAPS OR) 2/9/2025 Self Yes Yes   Sig: Take 1 capsule by mouth daily as needed.   Vitamin D3 (VITAMIN D, CHOLECALCIFEROL,) 25 mcg (1000 units) tablet 2/9/2025 Self Yes Yes   Sig: Take 1 tablet by mouth daily   albuterol (PROAIR HFA/PROVENTIL HFA/VENTOLIN HFA) 108 (90 Base) MCG/ACT inhaler 2/9/2025 Self Yes Yes   Sig: Inhale 1-2 puffs into the lungs every 6 hours as needed for shortness of breath, wheezing or cough.   cetirizine (ZYRTEC) 10 MG tablet 2/9/2025 Self Yes Yes   Sig: Take 10 mg by mouth daily   diphenhydrAMINE (BENADRYL) 25 MG capsule 2/9/2025 Evening Self Yes Yes   Sig: Take 25 mg by mouth at bedtime.   fluticasone (FLONASE) 50 MCG/ACT nasal spray Not Taking Self No No   Sig: Spray 1 spray into both nostrils daily   Patient not taking: Reported on 2/10/2025   hydroxychloroquine (PLAQUENIL) 200 MG tablet 2/9/2025 Self, Pharmacy No Yes   Sig: TAKE 1 TABLET (200 MG) BY MOUTH DAILY   ipratropium - albuterol 0.5 mg/2.5  mg/3 mL (DUONEB) 0.5-2.5 (3) MG/3ML neb solution Past Week Self Yes Yes   Sig: Take 1 vial by nebulization every 6 hours as needed for shortness of breath, wheezing or cough.   predniSONE (DELTASONE) 20 MG tablet 2/9/2025 Self, Pharmacy Yes Yes   Sig: Take 20 mg by mouth daily. X 5 days. Patient has old short term prescription at home that she had taken two doses of.   zinc sulfate (ZINCATE) 220 (50 Zn) MG capsule 2/9/2025 Self Yes Yes   Sig: Take 220 mg by mouth daily      Facility-Administered Medications: None        Review of Systems    The 10 point Review of Systems is negative other than noted in the HPI or here.      Physical Exam   Vital Signs: Temp: 98.4  F (36.9  C)   BP: 106/59 Pulse: 105   Resp: 21 SpO2: 95 % O2 Device: Nasal cannula Oxygen Delivery: 2 LPM  Weight: 0 lbs 0 oz    Gen: NAD, pleasant  HEENT: EOMI, MMM, nc in place  Resp: coarse in general, no focal crackles, a few scattered wheezes, no increased work of resp  CV: S1S2 heard, reg rhythm, mildly tachy rate  Abdo: soft, nontender, nondistended, bowel sounds present  Ext: calves nontender, well perfused  Neuro: aa, conversant, moving ext, CN grossly intact, no facial asymmetry, mildly hard of hearing      Medical Decision Making       77 MINUTES SPENT BY ME on the date of service doing chart review, history, exam, documentation & further activities per the note.      Data     I have personally reviewed the following data over the past 24 hrs:    17.1 (H)  \   15.3   / 184     141 101 13.0 /  86   3.5 25 0.88 \     ALT: 27 AST: 21 AP: 92 TBILI: 1.2   ALB: 4.5 TOT PROTEIN: 7.3 LIPASE: N/A     Trop: <6 BNP: N/A     Procal: 0.09 CRP: N/A Lactic Acid: 2.8 (H)       INR:  N/A PTT:  N/A   D-dimer:  0.37 Fibrinogen:  N/A       Imaging results reviewed over the past 24 hrs:   Recent Results (from the past 24 hours)   Chest XR,  PA & LAT    Narrative    EXAM: XR CHEST 2 VIEWS  LOCATION: [facility]  DATE: 2/10/2025    INDICATION:  check for pneumonia, cough and SOB Hypoxia  COMPARISON: 01/20/2025.      Impression    IMPRESSION: Enlarged cardiac silhouette. Prominence of the pulmonary vasculature. New bilateral interstitial opacities concerning for atypical pneumonia. Interstitial pulmonary edema could have a similar imaging appearance. Chronic lingular and right   middle lobe bronchiectasis and scarring. No pleural effusion or discernible pneumothorax.

## 2025-02-10 NOTE — ED NOTES
Bed: ED13  Expected date:   Expected time:   Means of arrival:   Comments:  Triage sats 88 text when ready thanks

## 2025-02-10 NOTE — PHARMACY-ADMISSION MEDICATION HISTORY
Pharmacist Admission Medication History    Admission medication history is complete. The information provided in this note is only as accurate as the sources available at the time of the update.    Information Source(s): Patient and CareEverywhere/SureScripts via in-person      Changes made to PTA medication list:  Added: Dayquil, albuterol, Benadryl  Deleted: doxycycline, Augmentin, azithromycin, duplicate prednisone prescriptions  Changed: None    Allergies reviewed with patient and updates made in EHR: no    Medication History Completed By: Eloisa Leung, PharmD 2/10/2025 2:30 PM    PTA Med List   Medication Sig Last Dose/Taking    albuterol (PROAIR HFA/PROVENTIL HFA/VENTOLIN HFA) 108 (90 Base) MCG/ACT inhaler Inhale 1-2 puffs into the lungs every 6 hours as needed for shortness of breath, wheezing or cough. 2/9/2025    cetirizine (ZYRTEC) 10 MG tablet Take 10 mg by mouth daily 2/9/2025    diphenhydrAMINE (BENADRYL) 25 MG capsule Take 25 mg by mouth at bedtime. 2/9/2025 Evening    Fluticasone-Umeclidin-Vilanterol (TRELEGY ELLIPTA) 200-62.5-25 MCG/ACT oral inhaler Inhale 1 puff into the lungs daily 2/9/2025    hydroxychloroquine (PLAQUENIL) 200 MG tablet TAKE 1 TABLET (200 MG) BY MOUTH DAILY 2/9/2025    ipratropium - albuterol 0.5 mg/2.5 mg/3 mL (DUONEB) 0.5-2.5 (3) MG/3ML neb solution Take 1 vial by nebulization every 6 hours as needed for shortness of breath, wheezing or cough. Past Week    predniSONE (DELTASONE) 20 MG tablet Take 20 mg by mouth daily. X 5 days. Patient has old short term prescription at home that she had taken two doses of. 2/9/2025    Pseudoephedrine-DM-GG-APAP (DAYQUIL LIQUICAPS OR) Take 1 capsule by mouth daily as needed. 2/9/2025    Vitamin D3 (VITAMIN D, CHOLECALCIFEROL,) 25 mcg (1000 units) tablet Take 1 tablet by mouth daily 2/9/2025    zinc sulfate (ZINCATE) 220 (50 Zn) MG capsule Take 220 mg by mouth daily 2/9/2025

## 2025-02-10 NOTE — ED NOTES
[facility]  ED Nurse Handoff Report    ED Chief complaint: Shortness of Breath      ED Diagnosis:   Final diagnoses:   None       Code Status: Admitting MD to discuss    Allergies:   Allergies   Allergen Reactions    Mold        Patient Story: Pt arrives in distress, hyperventialating, spouse stating her blood gases are dropping. Pt has been spot checking O2 sats last 1-2 months d/t resp infection. States was sitting at computer working and suddenly felt increased SOB. When checking pulse o2 her sats were 88%.   Focused Assessment:  A & Ox4. Pt placed on nasal canula 2L. Endorses SOB. 18g Rt AC. Antibiotics given    Labs Ordered and Resulted from Time of ED Arrival to Time of ED Departure   CBC WITH PLATELETS AND DIFFERENTIAL - Abnormal       Result Value    WBC Count 17.1 (*)     RBC Count 5.08      Hemoglobin 15.3      Hematocrit 45.0      MCV 89      MCH 30.1      MCHC 34.0      RDW 13.2      Platelet Count 184      % Neutrophils 89      % Lymphocytes 8      % Monocytes 3      % Eosinophils 0      % Basophils 0      % Immature Granulocytes 0      NRBCs per 100 WBC 0      Absolute Neutrophils 15.2 (*)     Absolute Lymphocytes 1.3      Absolute Monocytes 0.5      Absolute Eosinophils 0.0      Absolute Basophils 0.0      Absolute Immature Granulocytes 0.1      Absolute NRBCs 0.0     ISTAT GASES LACTATE VENOUS POCT - Abnormal    Lactic Acid POCT 2.8 (*)     Bicarbonate Venous POCT 27      O2 Sat, Venous POCT 21 (*)     pCO2 Venous POCT 42      pH Venous POCT 7.41      pO2 Venous POCT 16 (*)     Base Excess/Deficit (+/-) POCT 2.0     COMPREHENSIVE METABOLIC PANEL - Normal    Sodium 141      Potassium 3.5      Carbon Dioxide (CO2) 25      Anion Gap 15      Urea Nitrogen 13.0      Creatinine 0.88      GFR Estimate 73      Calcium 9.4      Chloride 101      Glucose 86      Alkaline Phosphatase 92      AST 21      ALT 27      Protein Total 7.3      Albumin 4.5      Bilirubin Total 1.2     D DIMER  QUANTITATIVE - Normal    D-Dimer Quantitative 0.37     INFLUENZA A/B, RSV AND SARS-COV2 PCR   TROPONIN T, HIGH SENSITIVITY   BLOOD CULTURE   BLOOD CULTURE     Chest XR,  PA & LAT    (Results Pending)         To be done/followed up on inpatient unit:  Admitting MD orders    Does this patient have any cognitive concerns?:  none    Activity level - Baseline/Home:  Independent  Activity Level - Current:   Independent    Patient's Preferred language: English   Needed?: No    Isolation: None  Infection: Not Applicable  Patient tested for COVID 19 prior to admission: YES  Bariatric?: No    Vital Signs:   Vitals:    02/10/25 1318 02/10/25 1324 02/10/25 1349 02/10/25 1350   BP:  116/64     Pulse: (!) 123  117 112   Resp: 30  27 29   Temp: 98.4  F (36.9  C)      SpO2: (!) 89%  (!) 86% 92%       Cardiac Rhythm:     Was the PSS-3 completed:   Yes  What interventions are required if any?               Family Comments:  present at bedside  OBS brochure/video discussed/provided to patient/family: No      For the majority of the shift this patient's behavior was Green.   Behavioral interventions performed were frequent rounding.    ED NURSE PHONE NUMBER: *[phone]

## 2025-02-11 ENCOUNTER — APPOINTMENT (OUTPATIENT)
Dept: CT IMAGING | Facility: CLINIC | Age: 65
DRG: 871 | End: 2025-02-11
Attending: PHYSICIAN ASSISTANT
Payer: COMMERCIAL

## 2025-02-11 PROBLEM — R65.20 SEPSIS WITH ACUTE RESPIRATORY FAILURE (H): Status: ACTIVE | Noted: 2025-02-11

## 2025-02-11 PROBLEM — A41.9 SEPSIS WITH ACUTE RESPIRATORY FAILURE (H): Status: ACTIVE | Noted: 2025-02-11

## 2025-02-11 PROBLEM — J96.00 SEPSIS WITH ACUTE RESPIRATORY FAILURE (H): Status: ACTIVE | Noted: 2025-02-11

## 2025-02-11 PROBLEM — J18.9 ATYPICAL PNEUMONIA: Status: ACTIVE | Noted: 2025-02-11

## 2025-02-11 LAB
ATRIAL RATE - MUSE: 106 BPM
ATRIAL RATE - MUSE: 118 BPM
BACTERIA SPT CULT: NORMAL
C PNEUM DNA SPEC QL NAA+PROBE: NOT DETECTED
CRP SERPL-MCNC: 49.37 MG/L
DIASTOLIC BLOOD PRESSURE - MUSE: NORMAL MMHG
DIASTOLIC BLOOD PRESSURE - MUSE: NORMAL MMHG
ERYTHROCYTE [DISTWIDTH] IN BLOOD BY AUTOMATED COUNT: 13.4 % (ref 10–15)
FLUAV H1 2009 PAND RNA SPEC QL NAA+PROBE: NOT DETECTED
FLUAV H1 RNA SPEC QL NAA+PROBE: NOT DETECTED
FLUAV H3 RNA SPEC QL NAA+PROBE: NOT DETECTED
FLUAV RNA SPEC QL NAA+PROBE: NOT DETECTED
FLUBV RNA SPEC QL NAA+PROBE: NOT DETECTED
GRAM STAIN RESULT: NORMAL
HADV DNA SPEC QL NAA+PROBE: NOT DETECTED
HCOV PNL SPEC NAA+PROBE: NOT DETECTED
HCT VFR BLD AUTO: 35.6 % (ref 35–47)
HGB BLD-MCNC: 12.1 G/DL (ref 11.7–15.7)
HMPV RNA SPEC QL NAA+PROBE: NOT DETECTED
HPIV1 RNA SPEC QL NAA+PROBE: NOT DETECTED
HPIV2 RNA SPEC QL NAA+PROBE: NOT DETECTED
HPIV3 RNA SPEC QL NAA+PROBE: NOT DETECTED
HPIV4 RNA SPEC QL NAA+PROBE: NOT DETECTED
INTERPRETATION ECG - MUSE: NORMAL
INTERPRETATION ECG - MUSE: NORMAL
L PNEUMO1 AG UR QL IA: NEGATIVE
M PNEUMO DNA SPEC QL NAA+PROBE: NOT DETECTED
MCH RBC QN AUTO: 30.1 PG (ref 26.5–33)
MCHC RBC AUTO-ENTMCNC: 34 G/DL (ref 31.5–36.5)
MCV RBC AUTO: 89 FL (ref 78–100)
MRSA DNA SPEC QL NAA+PROBE: NEGATIVE
P AXIS - MUSE: 64 DEGREES
P AXIS - MUSE: 64 DEGREES
PLATELET # BLD AUTO: 151 10E3/UL (ref 150–450)
PR INTERVAL - MUSE: 124 MS
PR INTERVAL - MUSE: 144 MS
QRS DURATION - MUSE: 76 MS
QRS DURATION - MUSE: 80 MS
QT - MUSE: 314 MS
QT - MUSE: 364 MS
QTC - MUSE: 440 MS
QTC - MUSE: 483 MS
R AXIS - MUSE: 32 DEGREES
R AXIS - MUSE: 41 DEGREES
RAD FLAG-ADDENDUM: NORMAL
RBC # BLD AUTO: 4.02 10E6/UL (ref 3.8–5.2)
RHEUMATOID FACT SERPL-ACNC: 13 IU/ML
RSV RNA SPEC QL NAA+PROBE: NOT DETECTED
RSV RNA SPEC QL NAA+PROBE: NOT DETECTED
RV+EV RNA SPEC QL NAA+PROBE: NOT DETECTED
S PNEUM AG SPEC QL: NEGATIVE
SA TARGET DNA: NEGATIVE
SPECIMEN TYPE: NORMAL
SYSTOLIC BLOOD PRESSURE - MUSE: NORMAL MMHG
SYSTOLIC BLOOD PRESSURE - MUSE: NORMAL MMHG
T AXIS - MUSE: 56 DEGREES
T AXIS - MUSE: 70 DEGREES
VENTRICULAR RATE- MUSE: 106 BPM
VENTRICULAR RATE- MUSE: 118 BPM
WBC # BLD AUTO: 13.9 10E3/UL (ref 4–11)

## 2025-02-11 PROCEDURE — 250N000013 HC RX MED GY IP 250 OP 250 PS 637: Performed by: HOSPITALIST

## 2025-02-11 PROCEDURE — 250N000009 HC RX 250: Performed by: INTERNAL MEDICINE

## 2025-02-11 PROCEDURE — 250N000011 HC RX IP 250 OP 636: Performed by: HOSPITALIST

## 2025-02-11 PROCEDURE — 85048 AUTOMATED LEUKOCYTE COUNT: CPT | Performed by: HOSPITALIST

## 2025-02-11 PROCEDURE — 85014 HEMATOCRIT: CPT | Performed by: HOSPITALIST

## 2025-02-11 PROCEDURE — 36415 COLL VENOUS BLD VENIPUNCTURE: CPT | Performed by: HOSPITALIST

## 2025-02-11 PROCEDURE — 250N000013 HC RX MED GY IP 250 OP 250 PS 637: Performed by: PHYSICIAN ASSISTANT

## 2025-02-11 PROCEDURE — 99223 1ST HOSP IP/OBS HIGH 75: CPT | Performed by: INTERNAL MEDICINE

## 2025-02-11 PROCEDURE — 87070 CULTURE OTHR SPECIMN AEROBIC: CPT | Performed by: INTERNAL MEDICINE

## 2025-02-11 PROCEDURE — 87205 SMEAR GRAM STAIN: CPT | Performed by: INTERNAL MEDICINE

## 2025-02-11 PROCEDURE — 999N000157 HC STATISTIC RCP TIME EA 10 MIN

## 2025-02-11 PROCEDURE — 94640 AIRWAY INHALATION TREATMENT: CPT

## 2025-02-11 PROCEDURE — 250N000013 HC RX MED GY IP 250 OP 250 PS 637: Performed by: INTERNAL MEDICINE

## 2025-02-11 PROCEDURE — 250N000009 HC RX 250: Performed by: HOSPITALIST

## 2025-02-11 PROCEDURE — 94640 AIRWAY INHALATION TREATMENT: CPT | Mod: 76

## 2025-02-11 PROCEDURE — 99233 SBSQ HOSP IP/OBS HIGH 50: CPT | Performed by: PHYSICIAN ASSISTANT

## 2025-02-11 PROCEDURE — 71250 CT THORAX DX C-: CPT

## 2025-02-11 PROCEDURE — 87899 AGENT NOS ASSAY W/OPTIC: CPT | Performed by: PHYSICIAN ASSISTANT

## 2025-02-11 PROCEDURE — 87641 MR-STAPH DNA AMP PROBE: CPT | Performed by: PHYSICIAN ASSISTANT

## 2025-02-11 PROCEDURE — 120N000001 HC R&B MED SURG/OB

## 2025-02-11 RX ORDER — ALBUTEROL SULFATE 0.83 MG/ML
2.5 SOLUTION RESPIRATORY (INHALATION)
Status: DISCONTINUED | OUTPATIENT
Start: 2025-02-11 | End: 2025-02-13 | Stop reason: HOSPADM

## 2025-02-11 RX ORDER — FLUTICASONE PROPIONATE 50 MCG
1 SPRAY, SUSPENSION (ML) NASAL 2 TIMES DAILY
Status: DISCONTINUED | OUTPATIENT
Start: 2025-02-11 | End: 2025-02-13 | Stop reason: HOSPADM

## 2025-02-11 RX ORDER — ALBUTEROL SULFATE 0.83 MG/ML
2.5 SOLUTION RESPIRATORY (INHALATION)
Status: DISCONTINUED | OUTPATIENT
Start: 2025-02-11 | End: 2025-02-11

## 2025-02-11 RX ORDER — CALCIUM CARBONATE 500(1250)
500 TABLET ORAL 2 TIMES DAILY WITH MEALS
Status: DISCONTINUED | OUTPATIENT
Start: 2025-02-11 | End: 2025-02-13 | Stop reason: HOSPADM

## 2025-02-11 RX ORDER — CHOLECALCIFEROL (VITAMIN D3) 50 MCG
50 TABLET ORAL DAILY
Status: DISCONTINUED | OUTPATIENT
Start: 2025-02-11 | End: 2025-02-13 | Stop reason: HOSPADM

## 2025-02-11 RX ORDER — SODIUM CHLORIDE FOR INHALATION 3 %
4 VIAL, NEBULIZER (ML) INHALATION
Status: DISCONTINUED | OUTPATIENT
Start: 2025-02-11 | End: 2025-02-13 | Stop reason: HOSPADM

## 2025-02-11 RX ORDER — DOXYCYCLINE 100 MG/10ML
100 INJECTION, POWDER, LYOPHILIZED, FOR SOLUTION INTRAVENOUS EVERY 12 HOURS
Status: DISCONTINUED | OUTPATIENT
Start: 2025-02-12 | End: 2025-02-13 | Stop reason: HOSPADM

## 2025-02-11 RX ADMIN — IPRATROPIUM BROMIDE AND ALBUTEROL SULFATE 3 ML: .5; 3 SOLUTION RESPIRATORY (INHALATION) at 16:42

## 2025-02-11 RX ADMIN — METHYLPREDNISOLONE SODIUM SUCCINATE 62.5 MG: 125 INJECTION, POWDER, FOR SOLUTION INTRAMUSCULAR; INTRAVENOUS at 06:01

## 2025-02-11 RX ADMIN — UMECLIDINIUM 1 PUFF: 62.5 AEROSOL, POWDER ORAL at 09:31

## 2025-02-11 RX ADMIN — CALCIUM 500 MG: 500 TABLET ORAL at 18:31

## 2025-02-11 RX ADMIN — SENNOSIDES AND DOCUSATE SODIUM 1 TABLET: 50; 8.6 TABLET ORAL at 09:30

## 2025-02-11 RX ADMIN — ALBUTEROL SULFATE 2.5 MG: 2.5 SOLUTION RESPIRATORY (INHALATION) at 21:09

## 2025-02-11 RX ADMIN — AZITHROMYCIN MONOHYDRATE 500 MG: 500 INJECTION, POWDER, LYOPHILIZED, FOR SOLUTION INTRAVENOUS at 15:29

## 2025-02-11 RX ADMIN — IPRATROPIUM BROMIDE AND ALBUTEROL SULFATE 3 ML: .5; 3 SOLUTION RESPIRATORY (INHALATION) at 12:17

## 2025-02-11 RX ADMIN — IPRATROPIUM BROMIDE AND ALBUTEROL SULFATE 3 ML: .5; 3 SOLUTION RESPIRATORY (INHALATION) at 00:11

## 2025-02-11 RX ADMIN — CEFEPIME 2 G: 2 INJECTION, POWDER, FOR SOLUTION INTRAVENOUS at 06:04

## 2025-02-11 RX ADMIN — Medication 50 MCG: at 15:29

## 2025-02-11 RX ADMIN — FLUTICASONE PROPIONATE 1 SPRAY: 50 SPRAY, METERED NASAL at 21:22

## 2025-02-11 RX ADMIN — SENNOSIDES AND DOCUSATE SODIUM 1 TABLET: 50; 8.6 TABLET ORAL at 21:23

## 2025-02-11 RX ADMIN — CETIRIZINE HYDROCHLORIDE 10 MG: 10 TABLET, FILM COATED ORAL at 09:30

## 2025-02-11 RX ADMIN — ENOXAPARIN SODIUM 40 MG: 40 INJECTION SUBCUTANEOUS at 21:24

## 2025-02-11 RX ADMIN — METHYLPREDNISOLONE SODIUM SUCCINATE 62.5 MG: 125 INJECTION, POWDER, FOR SOLUTION INTRAMUSCULAR; INTRAVENOUS at 21:32

## 2025-02-11 RX ADMIN — DIPHENHYDRAMINE HYDROCHLORIDE 25 MG: 25 CAPSULE ORAL at 21:23

## 2025-02-11 RX ADMIN — IPRATROPIUM BROMIDE AND ALBUTEROL SULFATE 3 ML: .5; 3 SOLUTION RESPIRATORY (INHALATION) at 09:30

## 2025-02-11 RX ADMIN — FLUTICASONE FUROATE AND VILANTEROL TRIFENATATE 1 PUFF: 200; 25 POWDER RESPIRATORY (INHALATION) at 09:31

## 2025-02-11 RX ADMIN — CEFEPIME 2 G: 2 INJECTION, POWDER, FOR SOLUTION INTRAVENOUS at 16:35

## 2025-02-11 RX ADMIN — SODIUM CHLORIDE SOLN NEBU 3% 4 ML: 3 NEBU SOLN at 21:09

## 2025-02-11 ASSESSMENT — ACTIVITIES OF DAILY LIVING (ADL)
ADLS_ACUITY_SCORE: 27

## 2025-02-11 NOTE — PROGRESS NOTES
RECEIVING UNIT ED HANDOFF REVIEW    ED Nurse Handoff Report was reviewed by: Olivia Nieves RN on February 10, 2025 at 8:35 PM

## 2025-02-11 NOTE — PROGRESS NOTES
[facility]  Hospitalist / [facility] Progress Note  Date Admitted: 2/10/2025  1:18 PM  Date of Service: 02/11/2025              Assessment and Plan:                                         This is a 64 year old, female, w a PMH of asthma, bronchiectasis, PNA/hypoxia 12/2023, osteoporosis,, who was admitted on 2/10/2025, with worsening shortness of breath, hypoxia, and thought to have severe sepsis with bilateral atypical pneumonia.    Principal Problem:    Acute respiratory failure with hypoxia (H)  Active Problems:    Bronchiectasis with acute exacerbation (H)    Pneumonia of both lungs due to infectious organism, unspecified part of lung    Exacerbation of asthma, unspecified asthma severity, unspecified whether persistent    Atypical pneumonia    Sepsis with acute respiratory failure (H)      PLAN  Acute respiratory failure with hypoxia (H), multifactorial  Bronchiectasis with acute exacerbation (H)  Worsening multifocal pneumonia of both lungs, suspected due to Atypical pneumonia  Exacerbation of asthma,   Sepsis with acute respiratory failure (H), lactic acidosis  Bronchiectasis hx. per notes pending appt w [facility] pulmonology.    Admitted 12/2023 with similar sx Multiple cold sx over recent months. PTA tx with ABX, steroids, last  visit 1/19. Recent contact with sick nephew. Monitors SpO2 at home down to 80%, with sudden worsening breathing, fatigue and chills, on 2/10 suddenly, prompting ED admit.  O2 sats 86% on room air, met sepsis criteria with tachycardia, lact 2.8 in ED, no hypotension., VBG 7.41/42/25, AG 15. Ddx favors new PNA  2/11-stable 2 L, IV ABX, VSS, note neg viral panel neg too, I did CT chest w/o to compare w 2wks prior (note worse c/w multifocal PNA), , urine antigens, pending pulm consult  -Admitted inpatient  -O2 via nasal cannula SpO2 greater than 90%  -Pulmonology consult,on admit query abx.steroid use  -admit CXR bilateral opacities C/W atypical pneumonia  - CT chest  W/O- New multifocal GGO/nodules, and bronchial wall thickening in lower lobes.  Suggesting worsening multifocal pneumonia.  -Blood cultures-on admit-no growth to date  - urine antigens ordered  -ABX-cefepime/azithromycin continues, (atypical and Pseudomonas coverage)  --recent abx Augmentin , 1/7/25, and doxy 10/2024.  Per chart.  Pulm to review  -PTA Plaquenil x6mo (from PCP) was HELD after arrival. ?dx for possible atypical PNA,  -COVID/flu/RSV negative,  Respiratory viral panel neg. Neg procal D-dimer negative.  -Steroids-IV Solu-Medrol-continuing 60 twice daily for now. Pulm to comment  - PTA Trelegy 200/62.5/25 daily continues.    --Note was using p.o. Pred x 5 days at home.  - Albuterol as needed persistent wheeze  - Incentive spirometer  - ECG nonischemic  - IVF-maintenance for now,   -CBC, BMP daily    Osteoporosis, hx T12 fracture  -Per past imaging. Pt reports past dx,  past fosamax. Ct w marked osteopenia, recs PCP to reeval  - not on PTA calcium now, d/w PCP , to add  - on PTA vit D  - hx of Pred use a risk    History of seasonal allergies  -PTA as needed Flonase    Code Status:Full Code  NUTRITION Combination Diet Regular Diet Adult     DVT Prophylaxis: Pneumatic Compression Devices   Springer Catheter: Not present  Lines: None     Cardiac Monitoring: None    NURSING COMMUNICATION:  Bedside rounding completed with nurse.  FAMILY COMMUNICATION:  she will update family.     Disposition Plan       Expected Discharge Date: 02/12/2025              Entered: Diogo Craven PA-C 02/11/2025, 1:58 PM      DISCHARGE PLANNING:  Medically Ready for Discharge: Anticipated in 2-4 Days    Criteria for discharge from the hospitalist perspective is improvement in breathing, treatment of multifocal pneumonia, complete pulmonology workup, switch to p.o. ABX, steroids.  Stable or improving O2 needs    Additional comments:   Discussed in detail w Dr Noel, Mike Brandt MD, , Hospitalist Attending Provider/Rounder. The  "above assessment and plan is the product of our joint decision making.     Time - I spent 50 minutes w greater than 50% spent in face to face counseling and education re dx, rx, plan and also including coordination of care, time documenting, and family updates if applicable.    Diogo Craven PA-C  Hospitalist [facility]  [facility]   Securely message with the Vocera Web Console (learn more here)  Text page via [employer] Paging/Directory      Please note this documentation was partially completed using Dragon medical dictation transcription software. It was briefly proofread, but will likely have unrecognized and unintended incorrect words, numbers/values or phrases in transcription Also this is primarily intended as a direct peer to peer communication with medical abbreviations and verbiage that may appear to be 'direct' as to succinctly relay medical info and opinions to care teams. If there are any questions on content/transcription, or diagnosis and plan, please contact me immediately for clarification.         SUBJECTIVE:   INTERVAL EVENTS Reviewed-      Today's chief complaint-similar shortness of breath  -Patient states that she feels a little better than yesterday but similar shortness of breath.  Stable shortness of breath, currently on 2lp O2.  She has not been coughing after bring up the sputum spam sample but will try.  Denies chest pain or pressure.  No current infection symptoms, denying fevers or chills.  No abdominal symptoms or pain.      - She reiterates that though she has been struggling with intermittent URI symptoms for a few months, this hit her \"out of the blue\" on 2/10, she started the day fine but rapidly became ill with cough shortness of breath and fevers.  Thus this plus updated imaging favors that the worsening pneumonia may have been more acute and not just worsening secondary from the bronchiectasis.  Note that viral panel though extensive is negative.  Query other atypical " cause?    -Is about to transfer care and establish with [facility] but has not had a chance to do so yet.    Social  -Works for [employer]  -Non-smoker         Physical Exam:     Vitals:    25 0421 25 0613 25 0802 25 1217   BP:   104/62    BP Location:   Left arm    Pulse: 70  75    Resp:   18    Temp:   98.1  F (36.7  C)    TempSrc:   Oral    SpO2: 92%  95% 95%   Weight:  79.8 kg (176 lb)       /62 (BP Location: Left arm)   Pulse 75   Temp 98.1  F (36.7  C) (Oral)   Resp 18   Wt 79.8 kg (176 lb)   LMP  (LMP Unknown)   SpO2 95%   BMI 25.99 kg/m     Temp (24hrs), Av.1  F (36.7  C), Min:97.6  F (36.4  C), Max:98.4  F (36.9  C)      Intake/Output Summary (Last 24 hours) at 2025 0821  Last data filed at 2/10/2025 2240  Gross per 24 hour   Intake 240 ml   Output --   Net 240 ml     Patient Vitals for the past 120 hrs:   Weight   25 0613 79.8 kg (176 lb)       LINES: PIV,    GENERAL APPEARANCE: Alert, supine in bed, in no acute distress.  HEENT: normocephalic, normal external exam.  NECK:  No JVD  CARDIOVASCULAR: Regular rate and rhythm, s1, s2  no murmur appreciated.    RESPIRATORY: O2 via nasal cannula 2 L, SpO2 97%, no increased WOB, coarse crackles and fine bronchiectasis findings, right worse than the left, mild expiratory wheeze.  Getting a neb shortly.,   GASTROINTESTINAL  Soft, non-tender,  No masses/organomegaly  NEUROLOGIC: nonfocal, UE movements appear grossly intact.  EXTREMITIES: not walked, MAEW, No peripheral edema noted.  SKIN: No rashes or lesions.  PSYCHIATRIC: Alert and appears fully oriented( to baseline ). Mood - no sx offered, Affect very pleasant, chatty, friendly, seems to have good understanding of and agree w above plan for the day    Meds- reviewed         Data:     I reviewed most recent (and historical) labs in th past 24hrs, and personally reviewed imaging    CBC with Diff:  Recent Labs   Lab Test 25  0845 02/10/25  3346  12/03/23  0756   WBC 13.9* 17.1* 12.2*   HGB 12.1 15.3 11.9   MCV 89 89 89    184 161        BMP:  Recent Labs   Lab Test 02/10/25  1342 12/05/23  0808 12/05/23  0208 12/03/23  1218 12/03/23  0756 12/02/23  1759 12/02/23  0934     --   --   --  138  --  136   POTASSIUM 3.5  --   --   --  4.1  --  4.2   CHLORIDE 101  --   --   --  107  --  98   CO2 25  --   --   --  24  --  24   ANIONGAP 15  --   --   --  7  --  14   GLC 86 84 137*   < > 144*   < > 89   BUN 13.0  --   --   --  18.5  --  21.7   CR 0.88  --   --   --  0.87  --  0.97*   GFRESTIMATED 73  --   --   --  74  --  65   BJORN 9.4  --   --   --  8.5*  --  9.3    < > = values in this interval not displayed.        Diabetes:  Recent Labs   Lab 02/10/25  1342   GLC 86       VBG/Venous Blood Gas  Recent Labs   Lab 02/10/25  1344   PHV 7.41   PCO2V 42   PO2V 16*   HCO3V 27   BRISSA 2.0     ABG:  -No lab results found in last 7 days.    Lactic Acid:    Lab Results   Component Value Date    LACT 0.9 02/10/2025    LACT 2.8 02/10/2025    LACT 1.1 12/02/2023           Micro results:    30-Day Micro Results       Collected Updated Procedure Result Status      02/10/2025 2234 02/11/2025 0158 Respiratory Panel PCR [[id number]]    Swab from Nasopharyngeal    Final result Component Value   Adenovirus Not Detected   Coronavirus Not Detected   This test detects Coronavirus 229E, HKU1, NL63 and OC43 but does not distinguish between them. It does not detect MERS ( Respiratory Syndrome), SARS (Severe Acute Respiratory Syndrome) or 2019-nCoV (Novel 2019) Coronavirus.   Human Metapneumovirus Not Detected   Human Rhin/Enterovirus Not Detected   Influenza A Not Detected   Influenza A, H1 Not Detected   Influenza A 2009 H1N1 Not Detected   Influenza A, H3 Not Detected   Influenza B Not Detected   Parainfluenza Virus 1 Not Detected   Parainfluenza Virus 2 Not Detected   Parainfluenza Virus 3 Not Detected   Parainfluenza Virus 4 Not Detected   Respiratory  Syncytial Virus A Not Detected   Respiratory Syncytial Virus B Not Detected   Chlamydia Pneumoniae Not Detected   Mycoplasma Pneumoniae Not Detected            02/10/2025 1418 02/11/2025 0646 Blood Culture Peripheral Blood [[id number]]   Peripheral Blood    Preliminary result Component Value   Culture No growth after 12 hours  [P]                02/10/2025 1418 02/11/2025 0646 Blood Culture Peripheral Blood [[id number]]   Peripheral Blood    Preliminary result Component Value   Culture No growth after 12 hours  [P]                02/10/2025 1342 02/10/2025 1452 Influenza A/B, RSV and SARS-CoV2 PCR (COVID-19) Nasopharyngeal [[id number]]    Swab from Nasopharyngeal    Final result Component Value   Influenza A PCR Negative   Influenza B PCR Negative   RSV PCR Negative   SARS CoV2 PCR Negative   NEGATIVE: SARS-CoV-2 (COVID-19) RNA not detected, presumed negative.                         IMAGING:   IMAGING, During Current Admission:  CT Chest w/o Contrast 2/11/2025  EXAM: CT CHEST W/O CONTRAST LOCATION: [facility] DATE: 2/11/2025 INDICATION: Recurrent sepsis hypoxia, rule out atypical pneumonia infiltrates, compared to 1 16. , hx bronchiectasis, COMPARISON: CT chest performed on 1/16/2024 TECHNIQUE: CT chest without IV contrast. Multiplanar reformats were obtained. Dose reduction techniques were used. CONTRAST: None. FINDINGS: LUNGS AND PLEURA: No pleural effusion or pneumothorax is seen. Stable biapical scarring in the lungs. Multiple new patchy groundglass and nodular opacities are seen in the lungs. These changes appear most significant within the bilateral lower lobes as well as the right middle lobe and lingula. Several new ground glass nodules are also noted including a 6 mm nodule in the right upper lobe (series 4, image 66). Consolidative opacities in the bilateral lower lobes also appear worsened since the prior exam with associated bronchial wall thickening and air bronchograms.  Solid appearing pulmonary nodules appear unchanged measuring up to 8 mm in the right lower lobe (series 4, image 223). Paramedian bronchiectasis with associated consolidations in the upper lobes and right middle lobe appears unchanged. MEDIASTINUM/AXILLAE: Stable subcentimeter mediastinal lymph nodes measuring up to 7 mm in the right paratracheal space, nonspecific and possibly reactive (series 2, image 25). Heart size is normal. CORONARY ARTERY CALCIFICATION: None. UPPER ABDOMEN: Unremarkable. MUSCULOSKELETAL: Diffuse osteopenia is present. Multilevel degenerative changes are present in the spine. 10 mm sclerotic lesion within the elbow 3 vertebral body was outside the field-of-view on the prior exam. Stable vertebral body hemangioma at the T11 vertebral level.   IMPRESSION: 1.  Multifocal, new patchy groundglass and nodular opacities in the lungs. New consolidative opacities with worsening bronchial wall thickening is also present in the lower lobes. Several new groundglass nodules are present as described above. Constellation of findings are suggestive of worsening multifocal pneumonia. Recommend 3 month CT chest follow-up exam to evaluate for resolution. 2.  Stable paramedian bronchiectasis with associated consolidations in the upper lobes and right middle lobe. 3.  Stable solid pulmonary nodules measuring up to 8 mm in the right lower lobe.     REFERENCE: Guidelines for Management of Incidental Pulmonary Nodules Detected on CT Images: From the Fleischner Society 2017. Guidelines apply to incidental nodules in patients who are 35 years or older. Guidelines do not apply to lung cancer screening, patients with immunosuppression, or patients with known primary cancer. MULTIPLE NODULES Nodule size <6 mm Low-risk patients: No follow-up needed. High-risk patients: Optional follow-up at 12 months. Nodule size 6 mm or larger Low-risk patients: Follow-up CT at 3-6 months, then consider CT at 18-24 months. High-risk  patients: Follow-up CT at 3-6 months, then at 18-24 months if no change. -Use most suspicious nodule as guide to management. REFERENCE: Guidelines for Management of Incidental Pulmonary Nodules Detected on CT Images: From the Fleischner Society 2017. Guidelines apply to incidental nodules in patients who are 35 years or older. Guidelines do not apply to lung cancer screening, patients with immunosuppression, or patients with known primary cancer. SUBSOLID NODULES Ground glass Nodule size <6 mm No routine follow-up. If suspicious, consider follow-up at 2 and 4 years. Nodule size 6 mm or greater CT at 6-12 months to confirm persistence, then CT every 2 years until 5 years. Part solid Nodule size <6 mm No routine follow-up. Nodule size 6 mm or greater CT at 3-6 months to confirm persistence. If unchanged and solid component remains <6 mm, annual CT for 5 years. Multiple CT at 3-6 months. If stable, consider CT at 2 and 4 years. Management based on the most suspicious nodule.     Chest XR,  PA & LAT 2/10/2025  IMPRESSION: Enlarged cardiac silhouette. Prominence of the pulmonary vasculature. New bilateral interstitial opacities concerning for atypical pneumonia. Interstitial pulmonary edema could have a similar imaging appearance. Chronic lingular and right middle lobe bronchiectasis and scarring. No pleural effusion or discernible pneumothora    IMAGING, in the previous 30d  XR Chest 2 Views: 1/21/2025  IMPRESSION: Scattered atelectasis and/or fibrosis similar to previous. No acute infiltrates.            Allergy:     Allergies   Allergen Reactions    Mold        Medical Decision Making       Please see A&P for additional details of medical decision making.  MANAGEMENT DISCUSSED with the following over the past 24 hours:     NOTE(S)/MEDICAL RECORDS REVIEWED over the past 24 hours:            Please note this documentation was partially completed using Dragon medical dictation software. It was briefly proofread, but may  still have unintended incorrect words or phrases transcribed. Also this is primarily intended as a direct peer to peer communication with medical abbreviations and verbiage that may appear to be 'direct' as to succinctly relay medical info and opinions. If there are any questions on content or diagnosis, please contact me for clarification.

## 2025-02-11 NOTE — PLAN OF CARE
[id number]    Orientation: A&Ox4  Aggression Stop Light: green   Activity: SBA  Diet/BS Checks: regular  Tele:  none  IV Access/Drains: R PIV SL with intermittent IV antibiotics.   Pain Management: denied pain  Abnormal VS/Results: VSS on 2L NC O2  Bowel/Bladder: continent B/B. No BM this shift.   Skin/Wounds: scattered bruising. Dry heels.  Consults: pulmonology.  D/C Disposition: pending    Other Info:   Up to the unit around 2100  VSS on 2L NC O2

## 2025-02-12 LAB
ANION GAP SERPL CALCULATED.3IONS-SCNC: 12 MMOL/L (ref 7–15)
BUN SERPL-MCNC: 16.3 MG/DL (ref 8–23)
CALCIUM SERPL-MCNC: 9.6 MG/DL (ref 8.8–10.4)
CCP AB SER IA-ACNC: 1.3 U/ML
CHLORIDE SERPL-SCNC: 106 MMOL/L (ref 98–107)
CREAT SERPL-MCNC: 0.86 MG/DL (ref 0.51–0.95)
EGFRCR SERPLBLD CKD-EPI 2021: 75 ML/MIN/1.73M2
ERYTHROCYTE [DISTWIDTH] IN BLOOD BY AUTOMATED COUNT: 13.6 % (ref 10–15)
GLUCOSE SERPL-MCNC: 144 MG/DL (ref 70–99)
HCO3 SERPL-SCNC: 25 MMOL/L (ref 22–29)
HCT VFR BLD AUTO: 39.2 % (ref 35–47)
HGB BLD-MCNC: 13.3 G/DL (ref 11.7–15.7)
MCH RBC QN AUTO: 30.4 PG (ref 26.5–33)
MCHC RBC AUTO-ENTMCNC: 33.9 G/DL (ref 31.5–36.5)
MCV RBC AUTO: 90 FL (ref 78–100)
PLATELET # BLD AUTO: 192 10E3/UL (ref 150–450)
POTASSIUM SERPL-SCNC: 4 MMOL/L (ref 3.4–5.3)
RBC # BLD AUTO: 4.38 10E6/UL (ref 3.8–5.2)
SODIUM SERPL-SCNC: 143 MMOL/L (ref 135–145)
WBC # BLD AUTO: 13 10E3/UL (ref 4–11)

## 2025-02-12 PROCEDURE — 250N000009 HC RX 250: Performed by: INTERNAL MEDICINE

## 2025-02-12 PROCEDURE — 86635 COCCIDIOIDES ANTIBODY: CPT | Performed by: INTERNAL MEDICINE

## 2025-02-12 PROCEDURE — 94640 AIRWAY INHALATION TREATMENT: CPT | Mod: 76

## 2025-02-12 PROCEDURE — 86038 ANTINUCLEAR ANTIBODIES: CPT | Performed by: INTERNAL MEDICINE

## 2025-02-12 PROCEDURE — 82374 ASSAY BLOOD CARBON DIOXIDE: CPT | Performed by: PHYSICIAN ASSISTANT

## 2025-02-12 PROCEDURE — 250N000011 HC RX IP 250 OP 636: Performed by: HOSPITALIST

## 2025-02-12 PROCEDURE — 85014 HEMATOCRIT: CPT | Performed by: PHYSICIAN ASSISTANT

## 2025-02-12 PROCEDURE — 86036 ANCA SCREEN EACH ANTIBODY: CPT | Performed by: INTERNAL MEDICINE

## 2025-02-12 PROCEDURE — 94799 UNLISTED PULMONARY SVC/PX: CPT

## 2025-02-12 PROCEDURE — 86481 TB AG RESPONSE T-CELL SUSP: CPT | Performed by: INTERNAL MEDICINE

## 2025-02-12 PROCEDURE — 120N000001 HC R&B MED SURG/OB

## 2025-02-12 PROCEDURE — 94640 AIRWAY INHALATION TREATMENT: CPT

## 2025-02-12 PROCEDURE — 250N000013 HC RX MED GY IP 250 OP 250 PS 637: Performed by: HOSPITALIST

## 2025-02-12 PROCEDURE — 94660 CPAP INITIATION&MGMT: CPT

## 2025-02-12 PROCEDURE — 80048 BASIC METABOLIC PNL TOTAL CA: CPT | Performed by: PHYSICIAN ASSISTANT

## 2025-02-12 PROCEDURE — 250N000011 HC RX IP 250 OP 636: Performed by: INTERNAL MEDICINE

## 2025-02-12 PROCEDURE — 99233 SBSQ HOSP IP/OBS HIGH 50: CPT | Performed by: INTERNAL MEDICINE

## 2025-02-12 PROCEDURE — 87449 NOS EACH ORGANISM AG IA: CPT | Performed by: INTERNAL MEDICINE

## 2025-02-12 PROCEDURE — 86698 HISTOPLASMA ANTIBODY: CPT | Performed by: INTERNAL MEDICINE

## 2025-02-12 PROCEDURE — 82103 ALPHA-1-ANTITRYPSIN TOTAL: CPT | Performed by: INTERNAL MEDICINE

## 2025-02-12 PROCEDURE — 999N000157 HC STATISTIC RCP TIME EA 10 MIN

## 2025-02-12 PROCEDURE — 36415 COLL VENOUS BLD VENIPUNCTURE: CPT | Performed by: PHYSICIAN ASSISTANT

## 2025-02-12 PROCEDURE — 250N000013 HC RX MED GY IP 250 OP 250 PS 637: Performed by: PHYSICIAN ASSISTANT

## 2025-02-12 RX ADMIN — FLUTICASONE PROPIONATE 1 SPRAY: 50 SPRAY, METERED NASAL at 20:05

## 2025-02-12 RX ADMIN — DIPHENHYDRAMINE HYDROCHLORIDE 25 MG: 25 CAPSULE ORAL at 22:27

## 2025-02-12 RX ADMIN — ALBUTEROL SULFATE 2.5 MG: 2.5 SOLUTION RESPIRATORY (INHALATION) at 08:30

## 2025-02-12 RX ADMIN — ALBUTEROL SULFATE 2.5 MG: 2.5 SOLUTION RESPIRATORY (INHALATION) at 14:00

## 2025-02-12 RX ADMIN — CEFEPIME 2 G: 2 INJECTION, POWDER, FOR SOLUTION INTRAVENOUS at 08:08

## 2025-02-12 RX ADMIN — CALCIUM 500 MG: 500 TABLET ORAL at 09:00

## 2025-02-12 RX ADMIN — SODIUM CHLORIDE SOLN NEBU 3% 4 ML: 3 NEBU SOLN at 08:30

## 2025-02-12 RX ADMIN — FLUTICASONE FUROATE AND VILANTEROL TRIFENATATE 1 PUFF: 200; 25 POWDER RESPIRATORY (INHALATION) at 08:22

## 2025-02-12 RX ADMIN — DOXYCYCLINE 100 MG: 100 INJECTION, POWDER, LYOPHILIZED, FOR SOLUTION INTRAVENOUS at 15:18

## 2025-02-12 RX ADMIN — SODIUM CHLORIDE SOLN NEBU 3% 4 ML: 3 NEBU SOLN at 14:01

## 2025-02-12 RX ADMIN — UMECLIDINIUM 1 PUFF: 62.5 AEROSOL, POWDER ORAL at 08:22

## 2025-02-12 RX ADMIN — ALBUTEROL SULFATE 2.5 MG: 2.5 SOLUTION RESPIRATORY (INHALATION) at 20:39

## 2025-02-12 RX ADMIN — Medication 50 MCG: at 08:08

## 2025-02-12 RX ADMIN — SODIUM CHLORIDE SOLN NEBU 3% 4 ML: 3 NEBU SOLN at 20:39

## 2025-02-12 RX ADMIN — ENOXAPARIN SODIUM 40 MG: 40 INJECTION SUBCUTANEOUS at 20:15

## 2025-02-12 RX ADMIN — METHYLPREDNISOLONE SODIUM SUCCINATE 62.5 MG: 125 INJECTION, POWDER, FOR SOLUTION INTRAMUSCULAR; INTRAVENOUS at 20:15

## 2025-02-12 RX ADMIN — SENNOSIDES AND DOCUSATE SODIUM 1 TABLET: 50; 8.6 TABLET ORAL at 20:15

## 2025-02-12 RX ADMIN — CEFEPIME 2 G: 2 INJECTION, POWDER, FOR SOLUTION INTRAVENOUS at 16:22

## 2025-02-12 RX ADMIN — CEFEPIME 2 G: 2 INJECTION, POWDER, FOR SOLUTION INTRAVENOUS at 00:09

## 2025-02-12 RX ADMIN — METHYLPREDNISOLONE SODIUM SUCCINATE 62.5 MG: 125 INJECTION, POWDER, FOR SOLUTION INTRAMUSCULAR; INTRAVENOUS at 08:08

## 2025-02-12 RX ADMIN — CETIRIZINE HYDROCHLORIDE 10 MG: 10 TABLET, FILM COATED ORAL at 08:08

## 2025-02-12 RX ADMIN — CALCIUM 500 MG: 500 TABLET ORAL at 18:25

## 2025-02-12 ASSESSMENT — ACTIVITIES OF DAILY LIVING (ADL)
ADLS_ACUITY_SCORE: 26

## 2025-02-12 NOTE — CONSULTS
"                                                         Pulmonary Consult  Aylin Harding MRN: [id number]  1960  Date of Admission:2/10/2025  Primary care provider: Erick Springer  ___________________________________    Aylin Harding MRN# [id number]   YOB: 1960 Age: 64 year old   Date of Admission: 2/10/2025     Reason for consult: I was asked by the hospital medicine team to evaluate this patient for \"atypical PNA, hypoxia, hx of bronchiectasis\".          Assessment and Recommendations:     64 year old female with HO life long non-smoker, asthma and seasonal allergies since childhood, repeated sinus concerns, hx of bronchiectasis admitted 2/10/2025 for was admitted on 2/10/2025, with worsening shortness of breath, hypoxia and is being seen for \"atypical PNA, hypoxia, hx of bronchiectasis.\"     Patient with mix of flare related to bronchiectasis and potential infection.  She has had multiple courses of steroids and antibiotics as an outpatient.  Now also finding to have increasing respiratory symptoms as well as new onset hypoxia.  At baseline she has focal areas of bronchiectasis in the lower lung fields as well as right middle lobe and lingula of the left upper lobe but no bronchiectasis in the upper portions of the lungs; she has diffuse nodularity which may likely represent mucus impaction.  No IBD history.  No history of AFB positive cultures or ever told that she had nontuberculous Mycobacterium nor any history of TB.  She did have a bronchoscopy performed in 2017 which had 1+ Aspergillus while lavage had only few eosinophils on the cell differential and no aspergillus galactamannan sent.  Longstanding uncontrolled asthma as well as repeated infections or recurrent silent aspiration.  She presents following increasing symptoms.  She does not have any eosinophilia and does have a leukocytosis though reports taking prednisone 20 mg about 2 days ago.  Unfortunately, " seeing her true allergic component would be very helpful, but the prednisone has disrupted ability to make that assessment at this time.  She history of repeated flares as represented by repeated urgent care visits though not many ED and hospitalizations.  She reports having asthma as a child with repeated hospitalizations and infections.  Currently her home regimen does not include any airway clearance but she does have albuterol nebs and DuoNeb and has been instructed to use a flutter valve she got her last hospitalization.    -Switch to albuterol nebs with 3% saline nebs scheduled along with as needed butyryl nebs  -Flutter valve 4 times a day in addition to chest PT  -Okay to continue home inhaler regimen  -Sputum culture if able to obtain in addition to AFB cultures x 3-initial sputum culture contaminated but already reordered with sputum induction by RT  -Fungal serologic workup-overall, nodules at this time do not have fungal appearance still lower on the differential  -Continue IV Solu-Medrol steroids at 60 mg twice daily  -Obtain strep and Legionella urine antigens  -MRSA swab negative-no need to start vancomycin  -Okay to continue cefepime addition to azithromycin for now  -Wean FiO2 for SpO2 greater than 89% both at rest/sleep and on exertion  -Though asymptomatic from aspiration standpoint, needs a video swallow study as part of bronchiectasis workup  -Will perform some workup for her bronchiectasis including IGRA, ELISHA/ANCA/RF/CCP and alpha-1 antitrypsin level; would wait for outpt appt for obtaining immunoglobulins levels since acue illness may make measurements abnormal and defer CF testing considering no other family members across multiple generations do not have anything similar.  -For her sinusitis, started Flonase- 1 spray twice a day; at home can do a Joan pot and Flonase as first-line therapy.  She also is on Zyrtec for her seasonal allergies which can continue.  -Already has outpatient  "appointment next week with full PFTs.  May need to have a 6-minute walk with O2 titration study also added on at that appointment or afterwards.  -Would stop Plaquenil as not really indicated for COVID-19 prevention anymore for quite sometime    Will continue to follow.    Ramirez Mcdonald MD  [facility],  of Medicine  Pulmonary/Critical Care Medicine  February 11, 2025           HPI wait for     Aylin Harding is a 64 year old female with HO life long non-smoker, asthma and seasonal allergies since childhood, repeated sinus concerns, hx of bronchiectasis admitted 2/10/2025 for was admitted on 2/10/2025, with worsening shortness of breath, hypoxia and is being seen for \"atypical PNA, hypoxia, hx of bronchiectasis.\"   Prior hospitalization 12/2023 for PNA and had tested +rhinovirus at that time.   Has previously been seen at [facility] for management for quite sometime and previous to that at [facility].   Last PFTs 11/2024 showing FEV1-1.46 L (50%) and FVC 1.89 L (50%) with ratio 77%.  FeNO-26 ppb.  Reports having a bronchoscopy in 1999 at [facility] and was told at that time she had bronchiectasis.   Had repeat bronchoscopy in 9/2017 with cultures growing out staph aureus and aspergillus though mostly PMN on cell diff. AFB culture was negative.   Has followed with [facility] for quite sometime but making switch over to [facility] soon.  Was told that Aspergillus was the cause to her bronchiectasis based on her 2017 bronchoscopy though it was very present prior to any findings of mold.  Reports hx of repeated hospitalizations related to respiratory symptoms throughout her childhood due to her asthma.   No clear rheumatologic (RF slightly positive in 2002 and ELISHA negative) or immunologic workup.  Aspergillus fumigatus IgE level measured in 2003 which was negative.  Total IgG normal 2003 as were subclasses of IgG.  Patient was initially prescribed Plaquenil by her PCP for potential " COVID-19 prevention in 2020.  No previous airway clearance regimen. Is on handheld inhalers with as needed short acting bronchodilators as well as air and DuoNebs as well as on Trelegy.  Previously on other forms of triple inhaler therapy. She does report at one-time being on a rotating course of antibiotics for 20 to 30 years ago for about half a year.  Does not remember any bacteria in particular that was initiation of this.  Reports having flareups in the regular basis 1-3 or 4 times a year.  She frequents urgent care regularly base of the chart.  She also calls in to her previous pulmonologist for management of acute symptoms.  Denies any reflux symptoms or aspiration symptoms.  Has sinus symptoms on a chronic basis which is worse in the springtime particularly; this particular time is also when her breathing is worse.  No history of hematemesis.  Has a history of seasonal allergies but has had immunology shots in the past.  Denies any GI symptoms or any history of inflammatory bowel disease.  No history of bowel or oral repeated infections.  She reports having the only case of asthma in her family though others with eczema and seasonal allergies.  Father was a smoker and had asbestos exposure and reports he had emphysema.  For for those who smoked in her family, no other reported to have COPD or emphysema.  She does have children with no reports of respiratory illnesses or conditions.  Considering the number of times she gets prednisone antibiotics per year, she feels like her pulmonary function test as well as her functional status have declined with time.  Has never been to pulmonary rehab.    Patient reports 1 to 2 months of difficulty breathing and productive cough but while at work earlier today she noted somewhat sudden fatigue and chills and subsequently laid down.  Upon getting up few hours later she was much more short of breath and had cough, headache, and right-sided chest pain - particularly when  coughing. She denies chest pain or pressure now.  Her SpO2 was down to 88% on her home oximeter. She also notes wheezing. She has had multiple bouts of this in recent months and reports antibiotic courses and prednisone without much improvement.  She also tried some over-the-counter cold medicines without much improvement.  She did take 20 mg of prednisone 2 days ago which was remaining from a prior prescription.  Of note she does not use home oxygen.  Report of a nephew who may have had a cold in the past week otherwise no sick exposures or travel. Patient denies new lower extremity edema, abdominal pain, nausea, vomiting, diarrhea, or urinary changes.   No hematemesis.  Work up is notable for leukocytosis, LA 2.8, and CXR with bilateral interstitial opacities concerning for atypical pneumonia. She has been started on supplemental oxygen, nebs, steroid, and azithromycin and cefepime.   CT chest obtained showing diffuse nodularity throughout there is a mucous impaction in addition to new multifocal patchy groundglass and nodular opacities as well as new consolidative opacities worsening bronchial wall thickening in the lower lobes.  COVID-19 and Respiratory panel PCR negative.  Patient states that after 24 hours of treatment she started to feel like she can breathe a little better though still having a lot of coughing and chest congestion.  Remains on 2 L/min nasal cannula while at rest.       Past Medical History:     Past Medical History:   Diagnosis Date    Asthma     Bronchiectasis 9/5/08    Hormone replacement therapy 2011    Osteoporosis 2001    followed by PCP (Dr. Springer)    Pneumonia, organism unspecified(486)     Uncomplicated asthma               Past Surgical History:      Past Surgical History:   Procedure Laterality Date    CARPAL TUNNEL RELEASE RT/LT  2009    [facility] ORAL SURGERY PROCEDURE                Social History:     Social History     Socioeconomic History    Marital status:      Spouse  name: kerri    Number of children: 2    Years of education: 14    Highest education level: Not on file   Occupational History    Occupation: manager     Employer: [employer]   Tobacco Use    Smoking status: Never    Smokeless tobacco: Never   Vaping Use    Vaping status: Never Used   Substance and Sexual Activity    Alcohol use: Not Currently     Comment: rare socially    Drug use: No    Sexual activity: Yes     Partners: Male     Birth control/protection: Post-menopausal   Other Topics Concern    Parent/sibling w/ CABG, MI or angioplasty before 65F 55M? Not Asked   Social History Narrative    Not on file     Social Drivers of Health     Financial Resource Strain: Not on file   Food Insecurity: Not on file   Transportation Needs: Not on file   Physical Activity: Not on file   Stress: Not on file   Social Connections: Unknown (7/24/2024)    Received from [facility]    Social Connections     Frequency of Communication with Friends and Family: Not on file   Interpersonal Safety: Low Risk  (2/10/2025)    Interpersonal Safety     Do you feel physically and emotionally safe where you currently live?: Yes     Within the past 12 months, have you been hit, slapped, kicked or otherwise physically hurt by someone?: No     Within the past 12 months, have you been humiliated or emotionally abused in other ways by your partner or ex-partner?: No   Housing Stability: Not on file               Family History:     Family History   Problem Relation Age of Onset    Arthritis Mother         b:1937    Cancer Mother     Arthritis Father         b:1934    Family History Negative Sister         2 sisters b:1958,1962    Family History Negative Brother         2 brothers b:1960,1963    No Known Problems Maternal Grandmother     No Known Problems Maternal Grandfather     No Known Problems Paternal Grandmother     No Known Problems Other     No Known Problems Brother     No Known Problems Sister                Immunizations:     Immunization History   Administered Date(s) Administered    COVID-19 MONOVALENT 12+ (Pfizer) 01/05/2021, 01/25/2021    Flu, Unspecified 10/12/1998    Influenza (H1N1) 11/30/2009    Influenza (High Dose) Trivalent,PF (Fluzone) 10/10/2024    Influenza (IIV3) PF 11/18/1999, 10/25/2001, 11/12/2002, 10/21/2004, 10/25/2005    Influenza Vaccine 18-64 (Flublok) 10/08/2021, 11/08/2022    Influenza Vaccine >6 months,quad, PF 11/04/2013, 10/15/2019, 10/11/2020, 10/31/2023    Pneumococcal 20 valent Conjugate (Prevnar 20) 10/10/2024    Pneumococcal 23 valent 10/25/2001    RSV Vaccine (Abrysvo) 10/31/2023    TDAP Vaccine (Adacel) 06/15/2018    Zoster recombinant adjuvanted (SHINGRIX) 05/25/2023, 08/01/2023              Allergies:     Allergies   Allergen Reactions    Mold                 Medications:     Current Facility-Administered Medications   Medication Dose Route Frequency Provider Last Rate Last Admin    acetaminophen (TYLENOL) tablet 650 mg  650 mg Oral Q4H PRN Mike Noel MD        Or    acetaminophen (TYLENOL) Suppository 650 mg  650 mg Rectal Q4H PRN Mike Noel MD        albuterol (PROVENTIL) neb solution 2.5 mg  2.5 mg Nebulization Q6H Ramirez Mcdonald MD        albuterol (PROVENTIL) neb solution 2.5 mg  2.5 mg Nebulization Q2H PRN Mike Noel MD        calcium carbonate (TUMS) chewable tablet 1,000 mg  1,000 mg Oral 4x Daily PRN Mike Noel MD        calcium carbonate 500 mg (elemental) (OSCAL) tablet 500 mg  500 mg Oral BID w/meals Diogo Craven PA-C   500 mg at 02/11/25 1831    ceFEPIme (MAXIPIME) 2 g vial to attach to  mL bag for ADULTS or NS 50 mL bag for PEDS  2 g Intravenous Q8H Mike Noel MD   2 g at 02/11/25 1635    cetirizine (zyrTEC) tablet 10 mg  10 mg Oral Daily Mike Noel MD   10 mg at 02/11/25 0930    diphenhydrAMINE (BENADRYL) capsule 25 mg  25 mg Oral At Bedtime Mike Noel MD         [START ON 2/12/2025] doxycycline (VIBRAMYCIN) 100 mg vial to attach to  mL bag  100 mg Intravenous Q12H Ramirez Mcdonald MD        enoxaparin ANTICOAGULANT (LOVENOX) injection 40 mg  40 mg Subcutaneous Q24H Mike Noel MD   40 mg at 02/10/25 2227    fluticasone-vilanterol (BREO ELLIPTA) 200-25 MCG/ACT inhaler 1 puff  1 puff Inhalation Daily Mike Noel MD   1 puff at 02/11/25 0931    And    umeclidinium (INCRUSE ELLIPTA) 62.5 MCG/ACT inhaler 1 puff  1 puff Inhalation Daily Mike Noel MD   1 puff at 02/11/25 0931    [Held by provider] hydroxychloroquine (PLAQUENIL) tablet 200 mg  200 mg Oral Daily Mike Noel MD        lidocaine (LMX4) cream   Topical Q1H PRN Mike Noel MD        lidocaine 1 % 0.1-1 mL  0.1-1 mL Other Q1H PRN Mike Noel MD        methylPREDNISolone Na Suc (solu-MEDROL) injection 62.5 mg  62.5 mg Intravenous Q12H Mike Noel MD   62.5 mg at 02/11/25 0601    ondansetron (ZOFRAN ODT) ODT tab 4 mg  4 mg Oral Q6H PRN Mike Noel MD        Or    ondansetron (ZOFRAN) injection 4 mg  4 mg Intravenous Q6H PRN Mike Noel MD        polyethylene glycol (MIRALAX) Packet 17 g  17 g Oral BID PRN Mike Noel MD        senna-docusate (SENOKOT-S/PERICOLACE) 8.6-50 MG per tablet 1 tablet  1 tablet Oral BID Mike Noel MD   1 tablet at 02/11/25 0930    Or    senna-docusate (SENOKOT-S/PERICOLACE) 8.6-50 MG per tablet 2 tablet  2 tablet Oral BID Mike Noel MD        sodium chloride (NEBUSAL) 3 % neb solution 4 mL  4 mL Nebulization Q6H Ramirez Mcdonald MD        sodium chloride (PF) 0.9% PF flush 3 mL  3 mL Intracatheter Q8H Mike Noel MD   3 mL at 02/11/25 1643    sodium chloride (PF) 0.9% PF flush 3 mL  3 mL Intracatheter q1 min prn Mike Noel MD        vitamin D3 (CHOLECALCIFEROL) tablet 50 mcg  50 mcg Oral Daily Diogo Craven PA-C   50 mcg at 02/11/25 1522  "              Review of Systems:     ROS    As per HPI       Exam:   /59 (BP Location: Left arm)   Pulse 78   Temp 98.2  F (36.8  C) (Oral)   Resp 20   Wt 79.8 kg (176 lb)   LMP  (LMP Unknown)   SpO2 95%   BMI 25.99 kg/m      Vitals:    02/11/25 0613   Weight: 79.8 kg (176 lb)         Physical Exam  General: NAD, sitting upright in bed within nasal cannula on  Eyes: Anicteric sclera  Mouth: MMM w/o lesions  Neck: No masses, no thyromegaly, no stridor  Lymphatics: No significant cervical or supraclavicular LNs  CV: RR, no m/c/r;   Lungs: Coarse breath sounds and inspiratory Rales and intermittent peripheral wheezing all throughout lung fields  Abd: Soft, NT, ND  Ext: WWP, no BLE edema.  No clubbing  Skin: No rashes, cyanosis, or jaundice  Neuro: No focal deficits  Psych: Euthymic, normal affect, good eye contact         Data:   ROUTINE ICU LABS (Last four results)  CMP  Recent Labs   Lab 02/10/25  1342      POTASSIUM 3.5   CHLORIDE 101   CO2 25   ANIONGAP 15   GLC 86   BUN 13.0   CR 0.88   GFRESTIMATED 73   BJORN 9.4   PROTTOTAL 7.3   ALBUMIN 4.5   BILITOTAL 1.2   ALKPHOS 92   AST 21   ALT 27     CBC  Recent Labs   Lab 02/11/25  0845 02/10/25  1342   WBC 13.9* 17.1*   RBC 4.02 5.08   HGB 12.1 15.3   HCT 35.6 45.0   MCV 89 89   MCH 30.1 30.1   MCHC 34.0 34.0   RDW 13.4 13.2    184     INFLAMMATIONNo lab results found in last 7 days.    Invalid input(s): \"ESR\"    INRNo lab results found in last 7 days.  Arterial Blood GasNo lab results found in last 7 days.    ALL CULTURESNo results for input(s): \"CULT\" in the last 168 hours.           Imaging:     Personally reviewed by me:       Recent Results (from the past 24 hours)   CT Chest w/o Contrast   Result Value    Rad Flag-Addendum 10 mm sclerotic lesion at the L3 vertebral level    Addendum: 2/11/2025    CLERICAL ADDENDUM:  The original report had a clerical error: 10 mm sclerotic lesion within the L3 vertebral body was outside the field-of-view " on the prior exam. Findings are indeterminant and could reflect a bone island. Malignancy is unlikely in absence of known cancer   history. However, if further concern, consider correlation with bone scan.      [Consider Follow Up: 10 mm sclerotic lesion at the L3 vertebral level]    This report will be copied to the [facility] to ensure a provider acknowledges the finding.    END ADDENDUM      Narrative    EXAM: CT CHEST W/O CONTRAST  LOCATION: [facility]  DATE: 2/11/2025    INDICATION: Recurrent sepsis hypoxia, rule out atypical pneumonia infiltrates, compared to 1 16. , hx bronchiectasis,  COMPARISON: CT chest performed on 1/16/2024  TECHNIQUE: CT chest without IV contrast. Multiplanar reformats were obtained. Dose reduction techniques were used.  CONTRAST: None.    FINDINGS:   LUNGS AND PLEURA: No pleural effusion or pneumothorax is seen. Stable biapical scarring in the lungs. Multiple new patchy groundglass and nodular opacities are seen in the lungs. These changes appear most significant within the bilateral lower lobes as   well as the right middle lobe and lingula. Several new ground glass nodules are also noted including a 6 mm nodule in the right upper lobe (series 4, image 66). Consolidative opacities in the bilateral lower lobes also appear worsened since the prior   exam with associated bronchial wall thickening and air bronchograms. Solid appearing pulmonary nodules appear unchanged measuring up to 8 mm in the right lower lobe (series 4, image 223). Paramedian bronchiectasis with associated consolidations in the   upper lobes and right middle lobe appears unchanged.     MEDIASTINUM/AXILLAE: Stable subcentimeter mediastinal lymph nodes measuring up to 7 mm in the right paratracheal space, nonspecific and possibly reactive (series 2, image 25). Heart size is normal.    CORONARY ARTERY CALCIFICATION: None.    UPPER ABDOMEN: Unremarkable.    MUSCULOSKELETAL: Diffuse  osteopenia is present. Multilevel degenerative changes are present in the spine. 10 mm sclerotic lesion within the elbow 3 vertebral body was outside the field-of-view on the prior exam. Stable vertebral body hemangioma at the   T11 vertebral level.      Impression    IMPRESSION:   1.  Multifocal, new patchy groundglass and nodular opacities in the lungs. New consolidative opacities with worsening bronchial wall thickening is also present in the lower lobes. Several new groundglass nodules are present as described above.   Constellation of findings are suggestive of worsening multifocal pneumonia. Recommend 3 month CT chest follow-up exam to evaluate for resolution.  2.  Stable paramedian bronchiectasis with associated consolidations in the upper lobes and right middle lobe.  3.  Stable solid pulmonary nodules measuring up to 8 mm in the right lower lobe.    REFERENCE:  Guidelines for Management of Incidental Pulmonary Nodules Detected on CT Images: From the Fleischner Society 2017.   Guidelines apply to incidental nodules in patients who are 35 years or older.  Guidelines do not apply to lung cancer screening, patients with immunosuppression, or patients with known primary cancer.    MULTIPLE NODULES  Nodule size <6 mm  Low-risk patients: No follow-up needed.  High-risk patients: Optional follow-up at 12 months.    Nodule size 6 mm or larger  Low-risk patients: Follow-up CT at 3-6 months, then consider CT at 18-24 months.  High-risk patients: Follow-up CT at 3-6 months, then at 18-24 months if no change.  -Use most suspicious nodule as guide to management.    REFERENCE:  Guidelines for Management of Incidental Pulmonary Nodules Detected on CT Images: From the Fleischner Society 2017.   Guidelines apply to incidental nodules in patients who are 35 years or older.  Guidelines do not apply to lung cancer screening, patients with immunosuppression, or patients with known primary cancer.    SUBSOLID NODULES  Ground  glass  Nodule size <6 mm  No routine follow-up. If suspicious, consider follow-up at 2 and 4 years.    Nodule size 6 mm or greater  CT at 6-12 months to confirm persistence, then CT every 2 years until 5 years.    Part solid  Nodule size <6 mm  No routine follow-up.    Nodule size 6 mm or greater  CT at 3-6 months to confirm persistence. If unchanged and solid component remains <6 mm, annual CT for 5 years.    Multiple  CT at 3-6 months. If stable, consider CT at 2 and 4 years. Management based on the most suspicious nodule.           The above note was dictated using voice recognition software and may include typographical errors. Please contact the author for any clarifications.

## 2025-02-12 NOTE — PLAN OF CARE
[id number]    Orientation: A&Ox4  Aggression Stop Light: green   Activity: independent   Diet/BS Checks: regular  Tele:  none  IV Access/Drains: R PIV SL with intermittent IV antibiotics.   Pain Management: denied pain  Abnormal VS/Results: VSS on 2L NC O2  Bowel/Bladder: continent B/B. No BM this shift.   Skin/Wounds: scattered bruising. Dry heels.  Consults: pulmonology.  D/C Disposition: pending    Other Info:   VSS on 2L NC O2

## 2025-02-12 NOTE — PROGRESS NOTES
[facility]    Medicine Progress Note - Hospitalist Service        Date of Admission:  2/10/2025  1:18 PM    Assessment & Plan:      Aylin Harding is a 64 year old female with history of asthma, bronchiectasis, prior pneumonia and osteoporosis who presented to the ED with worsening shortness of breath and hypoxia.  Work up is notable for leukocytosis, with bilateral interstitial opacities concerning for atypical pneumonia. She has been started on supplemental oxygen, nebs, steroid, and azithromycin and cefepime.     Atypical pneumonia  Acute hypoxic respiratory failure  Elevated lactic acid  History of asthma and bronchiectasis, with acute exacerbation  *Multiple bouts of cold symptoms in recent months.  Has been on antibiotics and steroids as well as over-the-counter cold medicines without much improvement.  Noted worsening shortness of breath, fatigue, chills, cough, and SpO2 80% on room air at home.  Recent sick contact with her nephew a few days prior to admission.    *CXR bilateral interstitial opacities possibly congruent with atypical pneumonia  *CT chest- Multifocal pneumonia and stable paramedian bronchiectasis with associated consolidations in the upper lobes and right middle lobe and stable solid pulmonary nodules measuring up to 8 mm in the right lower lobe.   *vbg pH 7.41, PCO2 42, LA 2.8 but AG normal at 15, HCO3 normal at 25  *D Dimer unremarkable at 0.37  *Leukocytosis of 17K, ANC elevated at 15  *Initial vitals temp 98.4, heart rate 100-1 20, BP 110s/60s, RR 20s, O2 sat 86% RA, improved to 92% on 2L NC  *troponin negative  *Flu A/B, RSV, COVID negative  -lactic acid improved to normal  -continue supplemental oxygen, goal >90%  -continue empiric doxycycline and cefepime   -IV solumedrol 60 bid   -pulmonary following, multiple workup initiated by pulmonary regarding bronchiectasis  -continue PTA trelegy  -aggressive IS  -Clinically much improved, weaned off oxygen.  Follow  "clinical course        Diet: Combination Diet Regular Diet Adult     DVT Prophylaxis: Enoxaparin (Lovenox) SQ   Springer Catheter: Not present  Code Status: Full Code     Disposition Plan      Expected Discharge Date: 02/12/2025              Entered: Yassine Correia MD 02/12/2025, 7:30 AM        Medically Ready for Discharge: Anticipated in 2-4 Days       Clinically Significant Risk Factors                                  # Asthma: noted on problem list           The patient's care was discussed with the Bedside Nurse and Patient.    Medical Decision Making       **CLEAR ALL SELECTIONS**      Labs/Imaging Reviewed:  See Information above and Data section below  Time SPENT BY ME on the date of service doing chart review, history, exam, documentation & further activities per the note:  50 MINUTES    Chart documentation was completed, in part, with [employer] voice-recognition software. Even though reviewed, some grammatical, spelling, and word errors may remain.    Yassine Correia MD  Hospitalist Service  [facility]  Text Page 7AM-6PM  Securely message with the Vocera Web Console (learn more here)  Text page via [employer] Paging/Directory    ______________________________________________________________________    Interval History   Overall feels much better today.  Weaned off oxygen.  Still with intermittent cough but not able to expectorate much.  Afebrile.    Data reviewed today: I reviewed all medications, new labs and imaging results over the last 24 hours. I personally reviewed no images or EKG's today.    Physical Exam   Vital signs:  Temp: 98.2  F (36.8  C) Temp src: Oral BP: 110/72 Pulse: 81   Resp: 18 SpO2: 96 % O2 Device: Nasal cannula Oxygen Delivery: 2 LPM   Weight: 80.3 kg (177 lb 0.5 oz)  Estimated body mass index is 26.14 kg/m  as calculated from the following:    Height as of 2/29/24: 1.753 m (5' 9\").    Weight as of this encounter: 80.3 kg (177 lb 0.5 oz).      Wt Readings from " Last 2 Encounters:   02/12/25 80.3 kg (177 lb 0.5 oz)   01/19/25 79.8 kg (176 lb)       Gen: AAOX3, NAD, comfortable  HEENT: Supple neck, moist oral mucosa, no pallor  Resp: Coarse breath sounds bilaterally, normal effort of breathing  CVS: RRR, no murmur  Abd/GI: Soft, non-tender. BS- normoactive.   Skin: Warm, dry no rashes  MSK:  no pedal edema  Neuro- CN- intact. No focal deficits.       Data   Recent Labs   Lab 02/11/25  0845 02/10/25  1342   WBC 13.9* 17.1*   HGB 12.1 15.3   MCV 89 89    184   NA  --  141   POTASSIUM  --  3.5   CHLORIDE  --  101   CO2  --  25   BUN  --  13.0   CR  --  0.88   ANIONGAP  --  15   BJORN  --  9.4   GLC  --  86   ALBUMIN  --  4.5   PROTTOTAL  --  7.3   BILITOTAL  --  1.2   ALKPHOS  --  92   ALT  --  27   AST  --  21       Recent Results (from the past 24 hours)   CT Chest w/o Contrast   Result Value    Rad Flag-Addendum 10 mm sclerotic lesion at the L3 vertebral level    Addendum: 2/11/2025    CLERICAL ADDENDUM:  The original report had a clerical error: 10 mm sclerotic lesion within the L3 vertebral body was outside the field-of-view on the prior exam. Findings are indeterminant and could reflect a bone island. Malignancy is unlikely in absence of known cancer   history. However, if further concern, consider correlation with bone scan.      [Consider Follow Up: 10 mm sclerotic lesion at the L3 vertebral level]    This report will be copied to the [facility] to ensure a provider acknowledges the finding.    END ADDENDUM      Narrative    EXAM: CT CHEST W/O CONTRAST  LOCATION: [facility]  DATE: 2/11/2025    INDICATION: Recurrent sepsis hypoxia, rule out atypical pneumonia infiltrates, compared to 1 16. , hx bronchiectasis,  COMPARISON: CT chest performed on 1/16/2024  TECHNIQUE: CT chest without IV contrast. Multiplanar reformats were obtained. Dose reduction techniques were used.  CONTRAST: None.    FINDINGS:   LUNGS AND PLEURA: No pleural  effusion or pneumothorax is seen. Stable biapical scarring in the lungs. Multiple new patchy groundglass and nodular opacities are seen in the lungs. These changes appear most significant within the bilateral lower lobes as   well as the right middle lobe and lingula. Several new ground glass nodules are also noted including a 6 mm nodule in the right upper lobe (series 4, image 66). Consolidative opacities in the bilateral lower lobes also appear worsened since the prior   exam with associated bronchial wall thickening and air bronchograms. Solid appearing pulmonary nodules appear unchanged measuring up to 8 mm in the right lower lobe (series 4, image 223). Paramedian bronchiectasis with associated consolidations in the   upper lobes and right middle lobe appears unchanged.     MEDIASTINUM/AXILLAE: Stable subcentimeter mediastinal lymph nodes measuring up to 7 mm in the right paratracheal space, nonspecific and possibly reactive (series 2, image 25). Heart size is normal.    CORONARY ARTERY CALCIFICATION: None.    UPPER ABDOMEN: Unremarkable.    MUSCULOSKELETAL: Diffuse osteopenia is present. Multilevel degenerative changes are present in the spine. 10 mm sclerotic lesion within the elbow 3 vertebral body was outside the field-of-view on the prior exam. Stable vertebral body hemangioma at the   T11 vertebral level.      Impression    IMPRESSION:   1.  Multifocal, new patchy groundglass and nodular opacities in the lungs. New consolidative opacities with worsening bronchial wall thickening is also present in the lower lobes. Several new groundglass nodules are present as described above.   Constellation of findings are suggestive of worsening multifocal pneumonia. Recommend 3 month CT chest follow-up exam to evaluate for resolution.  2.  Stable paramedian bronchiectasis with associated consolidations in the upper lobes and right middle lobe.  3.  Stable solid pulmonary nodules measuring up to 8 mm in the right lower  lobe.    REFERENCE:  Guidelines for Management of Incidental Pulmonary Nodules Detected on CT Images: From the Fleischner Society 2017.   Guidelines apply to incidental nodules in patients who are 35 years or older.  Guidelines do not apply to lung cancer screening, patients with immunosuppression, or patients with known primary cancer.    MULTIPLE NODULES  Nodule size <6 mm  Low-risk patients: No follow-up needed.  High-risk patients: Optional follow-up at 12 months.    Nodule size 6 mm or larger  Low-risk patients: Follow-up CT at 3-6 months, then consider CT at 18-24 months.  High-risk patients: Follow-up CT at 3-6 months, then at 18-24 months if no change.  -Use most suspicious nodule as guide to management.    REFERENCE:  Guidelines for Management of Incidental Pulmonary Nodules Detected on CT Images: From the Fleischner Society 2017.   Guidelines apply to incidental nodules in patients who are 35 years or older.  Guidelines do not apply to lung cancer screening, patients with immunosuppression, or patients with known primary cancer.    SUBSOLID NODULES  Ground glass  Nodule size <6 mm  No routine follow-up. If suspicious, consider follow-up at 2 and 4 years.    Nodule size 6 mm or greater  CT at 6-12 months to confirm persistence, then CT every 2 years until 5 years.    Part solid  Nodule size <6 mm  No routine follow-up.    Nodule size 6 mm or greater  CT at 3-6 months to confirm persistence. If unchanged and solid component remains <6 mm, annual CT for 5 years.    Multiple  CT at 3-6 months. If stable, consider CT at 2 and 4 years. Management based on the most suspicious nodule.       Medications   Current Facility-Administered Medications   Medication Dose Route Frequency Provider Last Rate Last Admin     Current Facility-Administered Medications   Medication Dose Route Frequency Provider Last Rate Last Admin    albuterol (PROVENTIL) neb solution 2.5 mg  2.5 mg Nebulization Q6H Ramirez Mcdonald MD   2.5  mg at 02/11/25 2109    calcium carbonate 500 mg (elemental) (OSCAL) tablet 500 mg  500 mg Oral BID w/meals Diogo Craven PA-C   500 mg at 02/11/25 1831    ceFEPIme (MAXIPIME) 2 g vial to attach to  mL bag for ADULTS or NS 50 mL bag for PEDS  2 g Intravenous Q8H Mike Noel MD   2 g at 02/12/25 0009    cetirizine (zyrTEC) tablet 10 mg  10 mg Oral Daily Mike Noel MD   10 mg at 02/11/25 0930    diphenhydrAMINE (BENADRYL) capsule 25 mg  25 mg Oral At Bedtime Mike Noel MD   25 mg at 02/11/25 2123    doxycycline (VIBRAMYCIN) 100 mg vial to attach to  mL bag  100 mg Intravenous Q12H Ramirez Mcdonald MD        enoxaparin ANTICOAGULANT (LOVENOX) injection 40 mg  40 mg Subcutaneous Q24H Mike Noel MD   40 mg at 02/11/25 2124    fluticasone (FLONASE) 50 MCG/ACT spray 1 spray  1 spray Both Nostrils BID Ramirez Mcdonald MD   1 spray at 02/11/25 2122    fluticasone-vilanterol (BREO ELLIPTA) 200-25 MCG/ACT inhaler 1 puff  1 puff Inhalation Daily Mike Noel MD   1 puff at 02/11/25 0931    And    umeclidinium (INCRUSE ELLIPTA) 62.5 MCG/ACT inhaler 1 puff  1 puff Inhalation Daily Mike Noel MD   1 puff at 02/11/25 0931    [Held by provider] hydroxychloroquine (PLAQUENIL) tablet 200 mg  200 mg Oral Daily Mike Noel MD        methylPREDNISolone Na Suc (solu-MEDROL) injection 62.5 mg  62.5 mg Intravenous Q12H Mike Noel MD   62.5 mg at 02/11/25 2132    senna-docusate (SENOKOT-S/PERICOLACE) 8.6-50 MG per tablet 1 tablet  1 tablet Oral BID Mike Noel MD   1 tablet at 02/11/25 2123    Or    senna-docusate (SENOKOT-S/PERICOLACE) 8.6-50 MG per tablet 2 tablet  2 tablet Oral BID Mike Noel MD        sodium chloride (NEBUSAL) 3 % neb solution 4 mL  4 mL Nebulization Q6H Ramirez Mcdonald MD   4 mL at 02/11/25 2109    sodium chloride (PF) 0.9% PF flush 3 mL  3 mL Intracatheter Q8H Mike Noel MD   3 mL  at 02/11/25 2122    vitamin D3 (CHOLECALCIFEROL) tablet 50 mcg  50 mcg Oral Daily Diogo Craven PA-C   50 mcg at 02/11/25 1525

## 2025-02-12 NOTE — PLAN OF CARE
Summary: admitted on 2/10/2025, with worsening shortness of breath, hypoxia, and thought to have severe sepsis with bilateral atypical pneumonia   DX: Acute respiratory failure with hypoxia, Bronchiectasis with acute exacerbation, Pneumonia of both lungs due to infectious organism, unspecified part of lung, Exacerbation of asthma, unspecified asthma severity, unspecified whether persistent, Atypical pneumonia, Sepsis with acute respiratory failure   PMH of asthma, bronchiectasis, PNA/hypoxia 12/2023, osteoporosis     Orientation: A&Ox4  Aggression Stop Light: green   Activity: SBA  Diet/BS Checks: regular  Tele:  none  IV Access/Drains: R PIV SL with intermittent IV antibiotics.   Pain Management: denied pain  Abnormal VS/Results: VSS on 2L NC O2  Bowel/Bladder: continent B/B. No BM this shift.   Skin/Wounds: scattered bruising. Dry heels.  Consults: pulmonology.  D/C Disposition: pending

## 2025-02-13 VITALS
OXYGEN SATURATION: 95 % | BODY MASS INDEX: 25.93 KG/M2 | SYSTOLIC BLOOD PRESSURE: 136 MMHG | HEIGHT: 69 IN | TEMPERATURE: 98 F | WEIGHT: 175.04 LBS | RESPIRATION RATE: 16 BRPM | HEART RATE: 70 BPM | DIASTOLIC BLOOD PRESSURE: 77 MMHG

## 2025-02-13 LAB
1,3 BETA GLUCAN SER-MCNC: <31 PG/ML
A1AT SERPL-MCNC: 187 MG/DL (ref 90–200)
ANA SER QL IF: NEGATIVE
ANCA AB PATTERN SER IF-IMP: NORMAL
ANION GAP SERPL CALCULATED.3IONS-SCNC: 12 MMOL/L (ref 7–15)
BACTERIA BLD CULT: NORMAL
BACTERIA BLD CULT: NORMAL
BUN SERPL-MCNC: 22.6 MG/DL (ref 8–23)
C-ANCA TITR SER IF: NORMAL {TITER}
CALCIUM SERPL-MCNC: 9.7 MG/DL (ref 8.8–10.4)
CHLORIDE SERPL-SCNC: 104 MMOL/L (ref 98–107)
CREAT SERPL-MCNC: 0.92 MG/DL (ref 0.51–0.95)
EGFRCR SERPLBLD CKD-EPI 2021: 69 ML/MIN/1.73M2
ERYTHROCYTE [DISTWIDTH] IN BLOOD BY AUTOMATED COUNT: 13.2 % (ref 10–15)
GLUCOSE SERPL-MCNC: 115 MG/DL (ref 70–99)
HCO3 SERPL-SCNC: 26 MMOL/L (ref 22–29)
HCT VFR BLD AUTO: 39.6 % (ref 35–47)
HGB BLD-MCNC: 13.2 G/DL (ref 11.7–15.7)
MCH RBC QN AUTO: 29.9 PG (ref 26.5–33)
MCHC RBC AUTO-ENTMCNC: 33.3 G/DL (ref 31.5–36.5)
MCV RBC AUTO: 90 FL (ref 78–100)
OBSERVATION IMP: NEGATIVE
PLATELET # BLD AUTO: 204 10E3/UL (ref 150–450)
POTASSIUM SERPL-SCNC: 3.8 MMOL/L (ref 3.4–5.3)
QUANTIFERON MITOGEN: 0.08 IU/ML
QUANTIFERON NIL TUBE: 0 IU/ML
QUANTIFERON TB1 TUBE: 0 IU/ML
QUANTIFERON TB2 TUBE: 0
RBC # BLD AUTO: 4.42 10E6/UL (ref 3.8–5.2)
SODIUM SERPL-SCNC: 142 MMOL/L (ref 135–145)
WBC # BLD AUTO: 13.3 10E3/UL (ref 4–11)

## 2025-02-13 PROCEDURE — 85027 COMPLETE CBC AUTOMATED: CPT | Performed by: PHYSICIAN ASSISTANT

## 2025-02-13 PROCEDURE — 250N000013 HC RX MED GY IP 250 OP 250 PS 637: Performed by: HOSPITALIST

## 2025-02-13 PROCEDURE — 94660 CPAP INITIATION&MGMT: CPT

## 2025-02-13 PROCEDURE — 250N000013 HC RX MED GY IP 250 OP 250 PS 637: Performed by: PHYSICIAN ASSISTANT

## 2025-02-13 PROCEDURE — 86003 ALLG SPEC IGE CRUDE XTRC EA: CPT

## 2025-02-13 PROCEDURE — 82374 ASSAY BLOOD CARBON DIOXIDE: CPT | Performed by: PHYSICIAN ASSISTANT

## 2025-02-13 PROCEDURE — 272N000735 HC KIT, CIRCUIT WITH NEB, VOLERA

## 2025-02-13 PROCEDURE — 80048 BASIC METABOLIC PNL TOTAL CA: CPT | Performed by: PHYSICIAN ASSISTANT

## 2025-02-13 PROCEDURE — 999N000157 HC STATISTIC RCP TIME EA 10 MIN

## 2025-02-13 PROCEDURE — 94640 AIRWAY INHALATION TREATMENT: CPT

## 2025-02-13 PROCEDURE — 250N000009 HC RX 250: Performed by: INTERNAL MEDICINE

## 2025-02-13 PROCEDURE — 250N000012 HC RX MED GY IP 250 OP 636 PS 637: Performed by: INTERNAL MEDICINE

## 2025-02-13 PROCEDURE — 94799 UNLISTED PULMONARY SVC/PX: CPT

## 2025-02-13 PROCEDURE — 250N000011 HC RX IP 250 OP 636: Performed by: HOSPITALIST

## 2025-02-13 PROCEDURE — 36415 COLL VENOUS BLD VENIPUNCTURE: CPT | Performed by: PHYSICIAN ASSISTANT

## 2025-02-13 PROCEDURE — 250N000011 HC RX IP 250 OP 636: Performed by: INTERNAL MEDICINE

## 2025-02-13 PROCEDURE — 99239 HOSP IP/OBS DSCHRG MGMT >30: CPT | Performed by: INTERNAL MEDICINE

## 2025-02-13 RX ORDER — PREDNISONE 20 MG/1
40 TABLET ORAL DAILY
Status: DISCONTINUED | OUTPATIENT
Start: 2025-02-13 | End: 2025-02-13 | Stop reason: HOSPADM

## 2025-02-13 RX ORDER — PREDNISONE 20 MG/1
40 TABLET ORAL DAILY
Qty: 6 TABLET | Refills: 0 | Status: SHIPPED | OUTPATIENT
Start: 2025-02-14 | End: 2025-02-17

## 2025-02-13 RX ORDER — SODIUM CHLORIDE FOR INHALATION 3 %
4 VIAL, NEBULIZER (ML) INHALATION EVERY 6 HOURS
Qty: 480 ML | Refills: 0 | Status: SHIPPED | OUTPATIENT
Start: 2025-02-13 | End: 2025-03-15

## 2025-02-13 RX ADMIN — CEFEPIME 2 G: 2 INJECTION, POWDER, FOR SOLUTION INTRAVENOUS at 08:59

## 2025-02-13 RX ADMIN — CALCIUM 500 MG: 500 TABLET ORAL at 08:59

## 2025-02-13 RX ADMIN — Medication 50 MCG: at 08:59

## 2025-02-13 RX ADMIN — FLUTICASONE PROPIONATE 1 SPRAY: 50 SPRAY, METERED NASAL at 09:03

## 2025-02-13 RX ADMIN — UMECLIDINIUM 1 PUFF: 62.5 AEROSOL, POWDER ORAL at 09:03

## 2025-02-13 RX ADMIN — SODIUM CHLORIDE SOLN NEBU 3% 4 ML: 3 NEBU SOLN at 08:37

## 2025-02-13 RX ADMIN — FLUTICASONE FUROATE AND VILANTEROL TRIFENATATE 1 PUFF: 200; 25 POWDER RESPIRATORY (INHALATION) at 09:03

## 2025-02-13 RX ADMIN — CEFEPIME 2 G: 2 INJECTION, POWDER, FOR SOLUTION INTRAVENOUS at 01:45

## 2025-02-13 RX ADMIN — ALBUTEROL SULFATE 2.5 MG: 2.5 SOLUTION RESPIRATORY (INHALATION) at 08:36

## 2025-02-13 RX ADMIN — DOXYCYCLINE 100 MG: 100 INJECTION, POWDER, LYOPHILIZED, FOR SOLUTION INTRAVENOUS at 02:21

## 2025-02-13 RX ADMIN — CETIRIZINE HYDROCHLORIDE 10 MG: 10 TABLET, FILM COATED ORAL at 08:59

## 2025-02-13 RX ADMIN — PREDNISONE 40 MG: 20 TABLET ORAL at 08:59

## 2025-02-13 ASSESSMENT — ACTIVITIES OF DAILY LIVING (ADL)
ADLS_ACUITY_SCORE: 26

## 2025-02-13 NOTE — DISCHARGE SUMMARY
[facility]    Discharge Summary  Hospitalist    Date of Admission:  2/10/2025  Date of Discharge:  2/13/2025  Discharging Provider: Yassine Correia MD    Discharge Diagnoses      Atypical pneumonia  Acute hypoxic respiratory failure  Elevated lactic acid  History of asthma and bronchiectasis, with acute exacerbation      Hospital Course:    Aylin Harding is a 64 year old female with history of asthma, bronchiectasis, prior pneumonia and osteoporosis who presented to the ED with worsening shortness of breath and hypoxia.  Work up is notable for leukocytosis, with bilateral interstitial opacities concerning for atypical pneumonia. She has been started on supplemental oxygen, nebs, steroid, and azithromycin and cefepime.      Atypical pneumonia  Acute hypoxic respiratory failure  Elevated lactic acid  History of asthma and bronchiectasis, with acute exacerbation  *Multiple bouts of cold symptoms in recent months.  Has been on antibiotics and steroids as well as over-the-counter cold medicines without much improvement.  Noted worsening shortness of breath, fatigue, chills, cough, and SpO2 80% on room air at home.  Recent sick contact with her nephew a few days prior to admission.    *CXR bilateral interstitial opacities possibly congruent with atypical pneumonia  *CT chest- Multifocal pneumonia and stable paramedian bronchiectasis with associated consolidations in the upper lobes and right middle lobe and stable solid pulmonary nodules measuring up to 8 mm in the right lower lobe.   *vbg pH 7.41, PCO2 42, LA 2.8 but AG normal at 15, HCO3 normal at 25  *D Dimer unremarkable at 0.37  *Leukocytosis of 17K, ANC elevated at 15  *Initial vitals temp 98.4, heart rate 100-1 20, BP 110s/60s, RR 20s, O2 sat 86% RA, improved to 92% on 2L NC  *troponin negative  *Flu A/B, RSV, COVID negative  -Sputum culture negative  -Most of the other sputum workup pending at this time  -Clinically she is  substantially better, weaned off oxygen and would like to go home.  -Discussed with  from pulmonary, he suggests Augmentin to complete a 7-day course.  Short course of prednisone, she has completed 4 days in the hospital, will give a prescription for 40 mg daily for 3 more days to complete a 7-day course.  -She is to continue 3% saline nebulization at home  -Continue aggressive incentive spirometry  -Continue prior to admission Trelegy and Cesar  -Outpatient follow-up with pulmonary on 2/17/2025         Yassine Correia MD    Significant Results and Procedures       Pending Results     Unresulted Labs Ordered in the Past 30 Days of this Admission       Date and Time Order Name Status Description    2/12/2025 10:32 AM Quantiferon TB Gold Plus Purple Tube In process     2/12/2025 10:32 AM Quantiferon TB Gold Plus Yellow Tube In process     2/12/2025 10:32 AM Quantiferon TB Gold Plus Green Tube In process     2/12/2025 10:32 AM Quantiferon TB Gold Plus Grey Tube In process     2/12/2025 12:01 AM Fungal Antibodies In process     2/12/2025 12:01 AM 1,3 Beta D glucan fungitell In process     2/10/2025  2:04 PM Blood Culture Peripheral Blood Preliminary     2/10/2025  2:04 PM Blood Culture Peripheral Blood Preliminary             Code Status   Full Code       Primary Care Physician   Erick Springer    Physical Exam   Temp: 98  F (36.7  C) Temp src: Oral BP: 136/77 Pulse: 70   Resp: 16 SpO2: 95 % O2 Device: None (Room air)      Constitutional: AAOX3, NAD, Appears comfortable  Neck- Supple, Good ROM, No JVD  Respiratory: CTA B/L, rhonchi bilaterally, normal effort of breathing  Cardiovascular: RRR, No murmur  GI: Soft, Non- tender, BS- normoactive  Neuro: CN- grossly intact, Motor strength 5/5 on all 4 extremities.     Discharge Disposition   Discharged to home  Condition at discharge: Stable    Consultations This Hospital Stay   PULMONARY IP CONSULT  PULMONARY IP CONSULT    Time Spent on this Encounter    IYassine MD, personally saw the patient today and spent greater than 30 minutes discharging this patient.    Discharge Orders      Reason for your hospital stay    Bronchiectasis with acute flare     Activity    Your activity upon discharge: activity as tolerated     Follow Up    Follow-up with pulmonary as previously scheduled on 2/17/2025     Diet    Follow this diet upon discharge: Current Diet:Orders Placed This Encounter      Combination Diet Regular Diet Adult     Hospital Follow-up with Existing Primary Care Provider (PCP)    Please see details below          Discharge Medications   Current Discharge Medication List        START taking these medications    Details   amoxicillin-clavulanate (AUGMENTIN) 875-125 MG tablet Take 1 tablet by mouth 2 times daily for 3 days.  Qty: 6 tablet, Refills: 0    Associated Diagnoses: Bronchiectasis with acute exacerbation (H)      sodium chloride (NEBUSAL) 3 % neb solution Take 4 mLs by nebulization every 6 hours.  Qty: 480 mL, Refills: 0    Comments: For patients with [place] Medicaid as their primary or secondary coverage, please dispense NDC [id number] as other manufacturers are not covered.  Associated Diagnoses: Bronchiectasis with acute exacerbation (H)           CONTINUE these medications which have CHANGED    Details   predniSONE (DELTASONE) 20 MG tablet Take 2 tablets (40 mg) by mouth daily for 3 days.  Qty: 6 tablet, Refills: 0    Associated Diagnoses: Bronchiectasis with acute exacerbation (H)           CONTINUE these medications which have NOT CHANGED    Details   albuterol (PROAIR HFA/PROVENTIL HFA/VENTOLIN HFA) 108 (90 Base) MCG/ACT inhaler Inhale 1-2 puffs into the lungs every 6 hours as needed for shortness of breath, wheezing or cough.      cetirizine (ZYRTEC) 10 MG tablet Take 10 mg by mouth daily      diphenhydrAMINE (BENADRYL) 25 MG capsule Take 25 mg by mouth at bedtime.      fluticasone (FLONASE) 50 MCG/ACT nasal spray Spray 1 spray into  both nostrils daily  Qty: 18.2 mL, Refills: 5    Associated Diagnoses: Allergic rhinitis, unspecified seasonality, unspecified trigger      Fluticasone-Umeclidin-Vilanterol (TRELEGY ELLIPTA) 200-62.5-25 MCG/ACT oral inhaler Inhale 1 puff into the lungs daily      ipratropium - albuterol 0.5 mg/2.5 mg/3 mL (DUONEB) 0.5-2.5 (3) MG/3ML neb solution Take 1 vial by nebulization every 6 hours as needed for shortness of breath, wheezing or cough.      Pseudoephedrine-DM-GG-APAP (DAYQUIL LIQUICAPS OR) Take 1 capsule by mouth daily as needed.      Vitamin D3 (VITAMIN D, CHOLECALCIFEROL,) 25 mcg (1000 units) tablet Take 1 tablet by mouth daily      zinc sulfate (ZINCATE) 220 (50 Zn) MG capsule Take 220 mg by mouth daily           STOP taking these medications       hydroxychloroquine (PLAQUENIL) 200 MG tablet Comments:   Reason for Stopping:             Allergies   Allergies   Allergen Reactions    Mold      Data   Most Recent 3 CBC's:  Recent Labs   Lab Test 02/12/25  1032 02/11/25  0845 02/10/25  1342   WBC 13.0* 13.9* 17.1*   HGB 13.3 12.1 15.3   MCV 90 89 89    151 184      Most Recent 3 BMP's:  Recent Labs   Lab Test 02/12/25  1032 02/10/25  1342 12/05/23  0808 12/03/23  1218 12/03/23  0756    141  --   --  138   POTASSIUM 4.0 3.5  --   --  4.1   CHLORIDE 106 101  --   --  107   CO2 25 25  --   --  24   BUN 16.3 13.0  --   --  18.5   CR 0.86 0.88  --   --  0.87   ANIONGAP 12 15  --   --  7   BJORN 9.6 9.4  --   --  8.5*   * 86 84   < > 144*    < > = values in this interval not displayed.     Most Recent 2 LFT's:  Recent Labs   Lab Test 02/10/25  1342 05/25/23  0825   AST 21 24   ALT 27 23   ALKPHOS 92 107*   BILITOTAL 1.2 0.8     Most Recent INR's and Anticoagulation Dosing History:  Anticoagulation Dose History           No data to display              Most Recent 3 Troponin's:No lab results found.  Most Recent Cholesterol Panel:  Recent Labs   Lab Test 05/25/23  0825   CHOL 154   LDL 69   HDL 71    TRIG 72     Most Recent 6 Bacteria Isolates From Any Culture (See EPIC Reports for Culture Details):No lab results found.  Most Recent TSH, T4 and A1c Labs:  Recent Labs   Lab Test 05/25/23  0825 04/29/22  1343   TSH 3.45 1.98   T4 1.24 1.16   A1C  --  5.2       Results for orders placed or performed during the hospital encounter of 02/10/25   Chest XR,  PA & LAT    Narrative    EXAM: XR CHEST 2 VIEWS  LOCATION: [facility]  DATE: 2/10/2025    INDICATION: check for pneumonia, cough and SOB Hypoxia  COMPARISON: 01/20/2025.      Impression    IMPRESSION: Enlarged cardiac silhouette. Prominence of the pulmonary vasculature. New bilateral interstitial opacities concerning for atypical pneumonia. Interstitial pulmonary edema could have a similar imaging appearance. Chronic lingular and right   middle lobe bronchiectasis and scarring. No pleural effusion or discernible pneumothorax.   CT Chest w/o Contrast     Value    Rad Flag-Addendum 10 mm sclerotic lesion at the L3 vertebral level    Addendum: 2/11/2025    CLERICAL ADDENDUM:  The original report had a clerical error: 10 mm sclerotic lesion within the L3 vertebral body was outside the field-of-view on the prior exam. Findings are indeterminant and could reflect a bone island. Malignancy is unlikely in absence of known cancer   history. However, if further concern, consider correlation with bone scan.      [Consider Follow Up: 10 mm sclerotic lesion at the L3 vertebral level]    This report will be copied to the [facility] to ensure a provider acknowledges the finding.    END ADDENDUM      Narrative    EXAM: CT CHEST W/O CONTRAST  LOCATION: [facility]  DATE: 2/11/2025    INDICATION: Recurrent sepsis hypoxia, rule out atypical pneumonia infiltrates, compared to 1 16. , hx bronchiectasis,  COMPARISON: CT chest performed on 1/16/2024  TECHNIQUE: CT chest without IV contrast. Multiplanar reformats were  obtained. Dose reduction techniques were used.  CONTRAST: None.    FINDINGS:   LUNGS AND PLEURA: No pleural effusion or pneumothorax is seen. Stable biapical scarring in the lungs. Multiple new patchy groundglass and nodular opacities are seen in the lungs. These changes appear most significant within the bilateral lower lobes as   well as the right middle lobe and lingula. Several new ground glass nodules are also noted including a 6 mm nodule in the right upper lobe (series 4, image 66). Consolidative opacities in the bilateral lower lobes also appear worsened since the prior   exam with associated bronchial wall thickening and air bronchograms. Solid appearing pulmonary nodules appear unchanged measuring up to 8 mm in the right lower lobe (series 4, image 223). Paramedian bronchiectasis with associated consolidations in the   upper lobes and right middle lobe appears unchanged.     MEDIASTINUM/AXILLAE: Stable subcentimeter mediastinal lymph nodes measuring up to 7 mm in the right paratracheal space, nonspecific and possibly reactive (series 2, image 25). Heart size is normal.    CORONARY ARTERY CALCIFICATION: None.    UPPER ABDOMEN: Unremarkable.    MUSCULOSKELETAL: Diffuse osteopenia is present. Multilevel degenerative changes are present in the spine. 10 mm sclerotic lesion within the elbow 3 vertebral body was outside the field-of-view on the prior exam. Stable vertebral body hemangioma at the   T11 vertebral level.      Impression    IMPRESSION:   1.  Multifocal, new patchy groundglass and nodular opacities in the lungs. New consolidative opacities with worsening bronchial wall thickening is also present in the lower lobes. Several new groundglass nodules are present as described above.   Constellation of findings are suggestive of worsening multifocal pneumonia. Recommend 3 month CT chest follow-up exam to evaluate for resolution.  2.  Stable paramedian bronchiectasis with associated consolidations in the  upper lobes and right middle lobe.  3.  Stable solid pulmonary nodules measuring up to 8 mm in the right lower lobe.    REFERENCE:  Guidelines for Management of Incidental Pulmonary Nodules Detected on CT Images: From the Fleischner Society 2017.   Guidelines apply to incidental nodules in patients who are 35 years or older.  Guidelines do not apply to lung cancer screening, patients with immunosuppression, or patients with known primary cancer.    MULTIPLE NODULES  Nodule size <6 mm  Low-risk patients: No follow-up needed.  High-risk patients: Optional follow-up at 12 months.    Nodule size 6 mm or larger  Low-risk patients: Follow-up CT at 3-6 months, then consider CT at 18-24 months.  High-risk patients: Follow-up CT at 3-6 months, then at 18-24 months if no change.  -Use most suspicious nodule as guide to management.    REFERENCE:  Guidelines for Management of Incidental Pulmonary Nodules Detected on CT Images: From the Fleischner Society 2017.   Guidelines apply to incidental nodules in patients who are 35 years or older.  Guidelines do not apply to lung cancer screening, patients with immunosuppression, or patients with known primary cancer.    SUBSOLID NODULES  Ground glass  Nodule size <6 mm  No routine follow-up. If suspicious, consider follow-up at 2 and 4 years.    Nodule size 6 mm or greater  CT at 6-12 months to confirm persistence, then CT every 2 years until 5 years.    Part solid  Nodule size <6 mm  No routine follow-up.    Nodule size 6 mm or greater  CT at 3-6 months to confirm persistence. If unchanged and solid component remains <6 mm, annual CT for 5 years.    Multiple  CT at 3-6 months. If stable, consider CT at 2 and 4 years. Management based on the most suspicious nodule.

## 2025-02-13 NOTE — PLAN OF CARE
Orientation: A&Ox4  Aggression Stop Light: Green   Activity: Independent, ambulating in fernández throughout shift  Diet/BS Checks: Regular  Tele:  NA  IV Access/Drains: R PIV SL w/ int abx  Pain Management: denies pain  Abnormal VS/Results: VSS on RA - weaned off of oxygen this shift  Bowel/Bladder: continent B/B.   Skin/Wounds: scattered bruising. Dry heels.  Consults: Pulmonology  D/C Disposition: pending    Other Info:   - scheduled nebs & chest physiotherapy  - multiple labs in process  - unable to obtain respiratory & AFB culture this shift d/t no sputum

## 2025-02-13 NOTE — PLAN OF CARE
2/12/25 -- [id number]    Orientation: A&O x4  Aggression Stop Light: Green   Activity: Independent   Diet/BS Checks: Regular  Tele:  n/a  IV Access/Drains: R PIV SL  Pain Management: denies  Abnormal VS/Results: VSS on RA  Bowel/Bladder: continent B/B  Skin/Wounds: scattered bruising  Consults: Pulmonology  D/C Disposition: pending    Other Info:   - Scheduled nebs   - Multiple labs in process  - Sputum cultures need to be collected

## 2025-02-13 NOTE — PROGRESS NOTES
Patient has been assessed for Home Oxygen needs. Oxygen readings:    *Pulse oximetry (SpO2) = 94% on room air at rest while awake.    *SpO2 improved to n/a% on n/a liters/minute at rest.    *SpO2 = 92% on room air during activity/with exercise.    *SpO2 improved to n/a% on n/aliters/minute during activity/with exercise.    Paged Dr. Correia that assessment is completed

## 2025-02-13 NOTE — PROGRESS NOTES
Discharge Note    Patient discharged to home via private vehicle  accompanied by significant other .  IV: Discontinued  Prescriptions filled and given to patient/family.   Belongings reviewed and sent with patient.   Home medications returned to patient: NA  Equipment sent with: N/A.   patient verbalizes understanding of discharge instructions. AVS given to patient.

## 2025-02-13 NOTE — PROGRESS NOTES
Brief pulmonary consult progress note:    2/10 Bcx2-NGTD. Negative- MSSA/MRSA nasal swab, strep/legionella urine antigens, respiratory panel PCR, COVID-19 PCR.  Sputum culture- contaminated and never re-collected.   AFB cultures/stains- never collected.   A1At level, ANCA, ELISHA, RF, CCP were all normal/negative.   IGRA, fungal antibodies, 1,3 beta d glucan in process.       History of respiratory bacterial culture growing MSSA and aspergillus fumigatus in 2017 of bronch/BAL cxs.  No history of Pseudomonas or other gram-negative bacteria on cultures.  No significant history of eosinophilia.  Does have reported concurrent asthma.  Patient has been weaned down to room air at rest.  Feels significantly better and is wishing to go home.    -Will follow-up labs that are still in process and contact the patient if concerning positive result  -Patient on room air at rest.  Please perform exertional O2 study before discharge to ensure no supplemental oxygen needed with activity.  -Give total of 7 days of steroids with prescription for prednisone 40 mg to complete course  -Since no evidence of Pseudomonas, can narrow antibiotics to Augmentin to complete a 7-day course for pneumonia  -Continue fluticasone nasal spray (1 spray each nostril twice a day) and when she goes home, first do [place] pot/nasal rinse and do fluticasone nasal spray as well as continue Zyrtec  -Continue albuterol nebs with 3% saline nebs twice a day and flutter valve 4 times a day  -Continue home inhalers along with as needed albuterol  -Already has outpatient pulmonary appointment next week with full PFTs or completion of serologic and sputum workup for bronchiectasis and need for 6-minute walk can be determined along with referral to pulm rehab.    Ramirez Mcdonald MD  [facility],  of Medicine  Pulmonary/Critical Care Medicine  February 13, 2025

## 2025-02-14 LAB
GAMMA INTERFERON BACKGROUND BLD IA-ACNC: 0 IU/ML
M TB IFN-G BLD-IMP: ABNORMAL
M TB IFN-G CD4+ BCKGRND COR BLD-ACNC: 0.08 IU/ML
MITOGEN IGNF BCKGRD COR BLD-ACNC: 0 IU/ML
MITOGEN IGNF BCKGRD COR BLD-ACNC: 0 IU/ML

## 2025-02-15 LAB
ASPERGILLUS AB TITR SER CF: NORMAL {TITER}
B DERMAT AB SER-ACNC: 0.5 IV
BACTERIA BLD CULT: NO GROWTH
BACTERIA BLD CULT: NO GROWTH
COCCIDIOIDES AB TITR SER CF: NORMAL {TITER}
H CAPSUL MYC AB TITR SER CF: NORMAL {TITER}
H CAPSUL YST AB TITR SER CF: NORMAL {TITER}

## 2025-02-17 ENCOUNTER — LAB (OUTPATIENT)
Dept: LAB | Facility: CLINIC | Age: 65
End: 2025-02-17
Payer: COMMERCIAL

## 2025-02-17 ENCOUNTER — MYC MEDICAL ADVICE (OUTPATIENT)
Dept: PULMONOLOGY | Facility: CLINIC | Age: 65
End: 2025-02-17

## 2025-02-17 ENCOUNTER — OFFICE VISIT (OUTPATIENT)
Dept: PULMONOLOGY | Facility: CLINIC | Age: 65
End: 2025-02-17
Payer: COMMERCIAL

## 2025-02-17 VITALS
DIASTOLIC BLOOD PRESSURE: 74 MMHG | BODY MASS INDEX: 25.03 KG/M2 | HEIGHT: 69 IN | WEIGHT: 169 LBS | HEART RATE: 89 BPM | OXYGEN SATURATION: 93 % | SYSTOLIC BLOOD PRESSURE: 110 MMHG

## 2025-02-17 DIAGNOSIS — J47.0 BRONCHIECTASIS WITH ACUTE LOWER RESPIRATORY INFECTION (H): ICD-10-CM

## 2025-02-17 DIAGNOSIS — R93.89 ABNORMAL CHEST CT: ICD-10-CM

## 2025-02-17 DIAGNOSIS — J47.9 BRONCHIECTASIS WITHOUT COMPLICATION (H): Primary | ICD-10-CM

## 2025-02-17 DIAGNOSIS — J45.50 SEVERE PERSISTENT ASTHMA, UNSPECIFIED WHETHER COMPLICATED (H): ICD-10-CM

## 2025-02-17 DIAGNOSIS — J47.9 BRONCHIECTASIS WITHOUT COMPLICATION (H): ICD-10-CM

## 2025-02-17 PROCEDURE — 82784 ASSAY IGA/IGD/IGG/IGM EACH: CPT

## 2025-02-17 PROCEDURE — 94060 EVALUATION OF WHEEZING: CPT | Performed by: INTERNAL MEDICINE

## 2025-02-17 PROCEDURE — 94729 DIFFUSING CAPACITY: CPT | Performed by: INTERNAL MEDICINE

## 2025-02-17 PROCEDURE — 99417 PROLNG OP E/M EACH 15 MIN: CPT | Performed by: INTERNAL MEDICINE

## 2025-02-17 PROCEDURE — 94726 PLETHYSMOGRAPHY LUNG VOLUMES: CPT | Performed by: INTERNAL MEDICINE

## 2025-02-17 PROCEDURE — 36415 COLL VENOUS BLD VENIPUNCTURE: CPT

## 2025-02-17 PROCEDURE — 86317 IMMUNOASSAY INFECTIOUS AGENT: CPT | Mod: 90

## 2025-02-17 PROCEDURE — 82785 ASSAY OF IGE: CPT

## 2025-02-17 PROCEDURE — 99000 SPECIMEN HANDLING OFFICE-LAB: CPT

## 2025-02-17 PROCEDURE — 99205 OFFICE O/P NEW HI 60 MIN: CPT | Mod: 25 | Performed by: INTERNAL MEDICINE

## 2025-02-17 PROCEDURE — 82787 IGG 1 2 3 OR 4 EACH: CPT

## 2025-02-17 RX ORDER — SODIUM CHLORIDE FOR INHALATION 3 %
3 VIAL, NEBULIZER (ML) INHALATION
Qty: 90 ML | Refills: 5 | Status: SHIPPED | OUTPATIENT
Start: 2025-02-17

## 2025-02-17 RX ORDER — MONTELUKAST SODIUM 10 MG/1
10 TABLET ORAL EVERY EVENING
Qty: 90 TABLET | Refills: 3 | Status: SHIPPED | OUTPATIENT
Start: 2025-02-17

## 2025-02-17 ASSESSMENT — ASTHMA QUESTIONNAIRES
QUESTION_4 LAST FOUR WEEKS HOW OFTEN HAVE YOU USED YOUR RESCUE INHALER OR NEBULIZER MEDICATION (SUCH AS ALBUTEROL): THREE OR MORE TIMES PER DAY
HOSPITALIZATION_OVERNIGHT_LAST_YEAR_TOTAL: ONE
ACT_TOTALSCORE: 9
QUESTION_5 LAST FOUR WEEKS HOW WOULD YOU RATE YOUR ASTHMA CONTROL: SOMEWHAT CONTROLLED
QUESTION_1 LAST FOUR WEEKS HOW MUCH OF THE TIME DID YOUR ASTHMA KEEP YOU FROM GETTING AS MUCH DONE AT WORK, SCHOOL OR AT HOME: SOME OF THE TIME
ACT_TOTALSCORE: 9
QUESTION_3 LAST FOUR WEEKS HOW OFTEN DID YOUR ASTHMA SYMPTOMS (WHEEZING, COUGHING, SHORTNESS OF BREATH, CHEST TIGHTNESS OR PAIN) WAKE YOU UP AT NIGHT OR EARLIER THAN USUAL IN THE MORNING: FOUR OR MORE NIGHTS A WEEK
QUESTION_2 LAST FOUR WEEKS HOW OFTEN HAVE YOU HAD SHORTNESS OF BREATH: MORE THAN ONCE A DAY
EMERGENCY_ROOM_LAST_YEAR_TOTAL: ONE

## 2025-02-17 ASSESSMENT — PAIN SCALES - GENERAL: PAINLEVEL_OUTOF10: NO PAIN (0)

## 2025-02-17 NOTE — LETTER
2/17/2025      Aylin Harding  [address] [place] [address]      Dear Colleague,    Thank you for referring your patient, Aylin Harding, to the [facility]. Please see a copy of my visit note below.      [facility]  [address] [place] [address]  [facility] [address]  Phone: [phone]  Fax: [phone]    Patient:  Aylin Harding, Date of birth 1960  Date of Visit:  02/17/2025  Reason for Consult: Bronchiectasis    Pulmonary Clinic New Patient Consult  Assessment and Plan:   Aylin Harding is a 64 year old female with a history of asthma and bronchiectasis who presents to pulmonary clinic today to establish care.  Asthma. Long standing history dating back to childhood.  FEV1 54% suggestive of moderate obstruction related to asthma and/or bronchiectasis.  More recently has been experiencing frequent exacerbations of underlying lung disease, including one severe episode requiring hospitalization last week.  Given concurrent bronchiectasis and aspergillus recovered on previous BAL, I would like to round out asthma and bronchiectasis work up by additionally sending for aspergillus IgE and IgG to look for ABPA as well as sending quantitative IgG level to ensure immune deficiency is not contributing to recurrent infections.  She will continue on high dose Trelegy and I would also like to add Singulair 10 mg daily.  If recurrent exacerbations in the future, I would consider a trial of an asthma biologic or chronic azithromycin given the possible overlap with bronchiectasis.  Bronchiectasis with recent acute exacerbation.  Extensive serologic and infectious work up sent during hospital stay and returned negative.  Some pockets of bronchiectasis on imaging seem to have a more chronic and almost fibrotic appearance which may help explain some of the restrictive component on PFTs.  Again, it's difficult to know  if bronchiectasis vs asthma is primary  of recurrent exacerbations.  I would like to institute airway clearance measures with albuterol nebs followed by 3% saline nebs and aerobika at least twice daily, ramping up to q4h prn if ill.  We will also plan to obtain sweat chloride testing with genetic counselor referral to evaluate for possible CF, especially given issues with recurrent pneumonia earlier in life.  Will plan to repeat CT imaging in about 3 months in effort to demonstrate resolution/improvement in multi-focal pneumonia.  Pulmonary nodule.  Unchanged, 8 mm pulmonary nodule in RLL, compared with 1/2024 imaging.  Attention on follow up CT, planned for 3 months as above.  Deconditioning.  Patient reports that she does not feel that she has fully recovered/returned to baseline related to multiple exacerbations over the last 1-2 years.  She would be an excellent candidate for pulmonary rehab and is agreeable to a referral.  [facility] paperwork also completed on her behalf.  Decreased but adequate resting oximetry.  Discussed the option for supplemental O2 therapy if found to have exertional hypoxia with saturations 87% or less.  She is not excited about the prospect of oxygen therapy unless absolutely necessary.  Will get 6MWT with pulmonary rehab and can assess further based on that data.  Questions and concerns were answered to the patient's satisfaction.  she was provided with my contact information should new questions or concerns arise in the interim.  she should return to clinic in 3-4 months with repeat CT Chest.  Up to date on PCV (2024), RSV (2023), seasonal flu vaccine.  COVID booster would be recommended if not already received.    Yamile Hope MD  Pulmonary and Critical Care Medicine    I spent 90 minutes on the date of encounter doing chart review, review of outside records, review of test results, conducting the patient visit, completing documentation and further activities as noted  above.    History of Present Illness    Aylin Harding is a 64 year old female with a history of asthma and bronchiectasis who presents to pulmonary clinic today for further evaluation.  Review of the record is notable for the following:  -Previously followed with [facility].  Last visit from 1/2024 note reviewed.  She was on Trelegy and Aerobika for airway clearance.  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  No known history of PsA infection.  -Recent outpatient visits for exacerbation of underlying lung disease on 12/29 and 1/19.  -Admitted on 2/10-13 for pneumonia.  Seen by pulm consult service.  CT chest showing multifocal consolidative opacities and worsening bronchial wall thickening with ground glass nodules.  Attempted to collect sputum which had oral contamination and was not recollected.  Additional work up included MRSA nares, strep urinary ag, legionella urinary ag, COVID, respiratory viral panel, A1AT, ELISHA, RF, CCP, Histo, Blasto, Aspergillus galactomannan and 1,3 Beta D Glucan obtained and all returned negative.  A quantiferon gold returned indeterminant.  She was discharged to complete a course of Augmentin, prednisone and start 3% saline nebs + PTA high dose Trelegy.    Milagros presents to clinic today and reports the following:  -Asthma dating back to childhood.  As a child she was frequently hospitalized - maybe 3x per year.  With advancing age things have been more hit and miss in terms of severity.    -Hospitalized in December 2023 and things have been rough since then.  She feels like if she gets sick she never seems to fully recover.  Feels like she is putting up with more than she probably should and generally feels tired.  -Has been on Trelegy for a number of years but is unsure how well this is working.  Also uses albuterol for rescue - uses this maybe 1x per day.  -Also has nebulizer at home.  Previously using duonebs but now using albuterol.    -Has not been taking  3% nebs and does not recall ever being on these previously.  -Coughing a lot at night recently but this has been better the last few nights.  Cough was not producing phlegm.  No hemoptysis.  Does not usually have a cough though.  -Received an aerobika last time she was in the hospital but does not have one at home that she uses regularly.  -Over the last few years, estimates that she has been treated 6-8x per year for pneumonia or bronchitis.  -No issues with sinus congestion   -Does have fall and spring allergies.  They have been told she may have a mold allergy based on previous bronchoscopy results.  -No heartburn or acid reflux.    -Never smoker.  -1 dog at home.  -Works for [email or web address].  Works from home.  -No bird exposure, down bedding, jacuzzi use, recent travel.    Family history notable for father with asbestos related lung disease and also tobacco use.      Review of Systems:  10 of 14 systems reviewed and are negative unless otherwise stated in HPI.    Past Medical History:   Diagnosis Date     Asthma      Bronchiectasis 9/5/08     Hormone replacement therapy 2011     Osteoporosis 2001    followed by PCP (Dr. Springer)     Pneumonia, organism unspecified(486)      Uncomplicated asthma          Current Outpatient Medications:      albuterol (PROAIR HFA/PROVENTIL HFA/VENTOLIN HFA) 108 (90 Base) MCG/ACT inhaler, Inhale 1-2 puffs into the lungs every 6 hours as needed for shortness of breath, wheezing or cough., Disp: , Rfl:      cetirizine (ZYRTEC) 10 MG tablet, Take 10 mg by mouth daily, Disp: , Rfl:      diphenhydrAMINE (BENADRYL) 25 MG capsule, Take 25 mg by mouth at bedtime., Disp: , Rfl:      fluticasone (FLONASE) 50 MCG/ACT nasal spray, Spray 1 spray into both nostrils daily, Disp: 18.2 mL, Rfl: 5     Fluticasone-Umeclidin-Vilanterol (TRELEGY ELLIPTA) 200-62.5-25 MCG/ACT oral inhaler, Inhale 1 puff into the lungs daily, Disp: , Rfl:      ipratropium - albuterol 0.5 mg/2.5 mg/3 mL (DUONEB)  "0.5-2.5 (3) MG/3ML neb solution, Take 1 vial by nebulization every 6 hours as needed for shortness of breath, wheezing or cough., Disp: , Rfl:      predniSONE (DELTASONE) 20 MG tablet, Take 2 tablets (40 mg) by mouth daily for 3 days., Disp: 6 tablet, Rfl: 0     Pseudoephedrine-DM-GG-APAP (DAYQUIL LIQUICAPS OR), Take 1 capsule by mouth daily as needed., Disp: , Rfl:      sodium chloride (NEBUSAL) 3 % neb solution, Take 4 mLs by nebulization every 6 hours., Disp: 480 mL, Rfl: 0     Vitamin D3 (VITAMIN D, CHOLECALCIFEROL,) 25 mcg (1000 units) tablet, Take 1 tablet by mouth daily, Disp: , Rfl:      zinc sulfate (ZINCATE) 220 (50 Zn) MG capsule, Take 220 mg by mouth daily, Disp: , Rfl:       Physical Exam:  /74 (BP Location: Left arm, Patient Position: Sitting, Cuff Size: Adult Large)   Pulse 89   Ht 1.753 m (5' 9\")   Wt 76.7 kg (169 lb)   LMP  (LMP Unknown)   SpO2 93%   BMI 24.96 kg/m    GENERAL: Well developed, well nourished, alert, and in no apparent distress.  HEENT: Normocephalic, atraumatic. PERRL, EOMI. Oral mucosa is moist. No perioral cyanosis.  NECK: supple, no obvious masses.  RESP:  Normal respiratory effort.  Diffuse bilateral low pitched wheezes. No cyanosis or clubbing.  CV: Normal S1, S2, regular rhythm, normal rate. No murmur.  No LE edema.   ABDOMEN: non-distended.   SKIN: warm and dry. No rash.  NEURO: Alert and oriented.  Normal gait.  Fluent speech.  PSYCH: mentation appears normal.     Results (personally reviewed in clinic today):  PFTs: R 71%, FVC 59%, FEV1 54%, %, TLC 81%, DLCO 80%.  Study demonstrates suspected concurrent obstruction and restriction with psedumonormalization of the FVC/FEV1 ratio.  There is air trapping and normal gas exchange.  No significant response to inhaled bronchodilators.  Imaging:  CT Chest 2/11:  1.  Multifocal, new patchy groundglass and nodular opacities in the lungs. New consolidative opacities with worsening bronchial wall thickening is also " present in the lower lobes. Several new groundglass nodules are present as described above.  Constellation of findings are suggestive of worsening multifocal pneumonia. Recommend 3 month CT chest follow-up exam to evaluate for resolution.  2.  Stable paramedian bronchiectasis with associated consolidations in the upper lobes and right middle lobe.  3.  Stable solid pulmonary nodules measuring up to 8 mm in the right lower lobe.      The above note was dictated using voice recognition software and may include typographical errors. Please contact the author for any clarifications.                                      Again, thank you for allowing me to participate in the care of your patient.        Sincerely,        Aaliyah Hope MD    Electronically signed

## 2025-02-17 NOTE — NURSING NOTE
"Chief Complaint   Patient presents with    New Patient     Bronchiectasis with acute lower respiratory infection (H)       Vitals:    02/17/25 1447   BP: 110/74   BP Location: Left arm   Patient Position: Sitting   Cuff Size: Adult Large   Pulse: 89   SpO2: 93%   Weight: 76.7 kg (169 lb)   Height: 1.753 m (5' 9\")       Body mass index is 24.96 kg/m .      KYRA Burden    "

## 2025-02-17 NOTE — PROGRESS NOTES
Aylin Harding comes into clinic today at the request of Dr. Hope, Ordering Provider for PFT    This service provided today was under the supervising provider of the day Dr. Hope, who was available if needed.    Rashel Herring, RT

## 2025-02-17 NOTE — NURSING NOTE
Nurse teaching given on Aerobika (flutter valve.) Answered patient's questions regarding flutter valve usage, nebulizer usage and order to use medications. Completed demonstration of disassembly and reassembly of flutter valve. Patient expresses understanding and acceptance of instructions. All patient's questions answered to satisfaction. Patient to update clinic with any questions. Provided patient with instruction packet for Aerobika.    Jasson Howard RN on 2/17/2025 at 4:10 PM

## 2025-02-17 NOTE — Clinical Note
As discussed - can you please let the patient know about the new sweat chloride and genetic counselor referrals for CF evaluation?  Orders are in.  Thanks!  KP

## 2025-02-17 NOTE — PATIENT INSTRUCTIONS
-START Airway clearance with albuterol or Duoneb followed by 3% saline neb.  Then use aerobika to help loosen and cough up phlegm.  Start this twice daily (AM and PM) but may increase to as frequently as every 4 hours if increased symptoms or feeling ill.  -Continue Trelegy 1 puff once daily remembering to rinse your mouth out with water following use.  -START Singulair 10 mg (1 pill) daily at bedtime.  -Blood work is ordered and has been added on to remaining blood in the hospital.  If there is not enough to send, we will notify you and you will need to make a lab appointment for a new blood draw.  -Referral placed for pulmonary rehab - they will contact you to schedule  -From pulmonary standpoint no reason to continue hydroxychloroquine.    Plan to return to clinic in 3-4 months with repeat CT chest.

## 2025-02-17 NOTE — PROGRESS NOTES
[facility] [address] [address]  Phone: [phone]  Fax: [phone]    Patient:  Aylin Harding, Date of birth 1960  Date of Visit:  02/17/2025  Reason for Consult: Bronchiectasis    Pulmonary Clinic New Patient Consult  Assessment and Plan:   Aylin Harding is a 64 year old female with a history of asthma and bronchiectasis who presents to pulmonary clinic today to establish care.  Asthma. Long standing history dating back to childhood.  FEV1 54% suggestive of moderate obstruction related to asthma and/or bronchiectasis.  More recently has been experiencing frequent exacerbations of underlying lung disease, including one severe episode requiring hospitalization last week.  Given concurrent bronchiectasis and aspergillus recovered on previous BAL, I would like to round out asthma and bronchiectasis work up by additionally sending for aspergillus IgE and IgG to look for ABPA as well as sending quantitative IgG level to ensure immune deficiency is not contributing to recurrent infections.  She will continue on high dose Trelegy and I would also like to add Singulair 10 mg daily.  If recurrent exacerbations in the future, I would consider a trial of an asthma biologic or chronic azithromycin given the possible overlap with bronchiectasis.  Bronchiectasis with recent acute exacerbation.  Extensive serologic and infectious work up sent during hospital stay and returned negative.  Some pockets of bronchiectasis on imaging seem to have a more chronic and almost fibrotic appearance which may help explain some of the restrictive component on PFTs.  Again, it's difficult to know if bronchiectasis vs asthma is primary  of recurrent exacerbations.  I would like to institute airway clearance measures with albuterol nebs followed by 3% saline nebs and aerobika at least twice daily, ramping up to q4h prn if ill.  We will also plan to  obtain sweat chloride testing with genetic counselor referral to evaluate for possible CF, especially given issues with recurrent pneumonia earlier in life.  Will plan to repeat CT imaging in about 3 months in effort to demonstrate resolution/improvement in multi-focal pneumonia.  Pulmonary nodule.  Unchanged, 8 mm pulmonary nodule in RLL, compared with 1/2024 imaging.  Attention on follow up CT, planned for 3 months as above.  Deconditioning.  Patient reports that she does not feel that she has fully recovered/returned to baseline related to multiple exacerbations over the last 1-2 years.  She would be an excellent candidate for pulmonary rehab and is agreeable to a referral.  [facility] paperwork also completed on her behalf.  Decreased but adequate resting oximetry.  Discussed the option for supplemental O2 therapy if found to have exertional hypoxia with saturations 87% or less.  She is not excited about the prospect of oxygen therapy unless absolutely necessary.  Will get 6MWT with pulmonary rehab and can assess further based on that data.  Questions and concerns were answered to the patient's satisfaction.  she was provided with my contact information should new questions or concerns arise in the interim.  she should return to clinic in 3-4 months with repeat CT Chest.  Up to date on PCV (2024), RSV (2023), seasonal flu vaccine.  COVID booster would be recommended if not already received.    Yamile Hope MD  Pulmonary and Critical Care Medicine    I spent 90 minutes on the date of encounter doing chart review, review of outside records, review of test results, conducting the patient visit, completing documentation and further activities as noted above.    History of Present Illness    Aylin Harding is a 64 year old female with a history of asthma and bronchiectasis who presents to pulmonary clinic today for further evaluation.  Review of the record is notable for the following:  -Previously followed with  [facility].  Last visit from 1/2024 note reviewed.  She was on Trelegy and Aerobika for airway clearance.  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  No known history of PsA infection.  -Recent outpatient visits for exacerbation of underlying lung disease on 12/29 and 1/19.  -Admitted on 2/10-13 for pneumonia.  Seen by pulm consult service.  CT chest showing multifocal consolidative opacities and worsening bronchial wall thickening with ground glass nodules.  Attempted to collect sputum which had oral contamination and was not recollected.  Additional work up included MRSA nares, strep urinary ag, legionella urinary ag, COVID, respiratory viral panel, A1AT, ELISHA, RF, CCP, Histo, Blasto, Aspergillus galactomannan and 1,3 Beta D Glucan obtained and all returned negative.  A quantiferon gold returned indeterminant.  She was discharged to complete a course of Augmentin, prednisone and start 3% saline nebs + PTA high dose Trelegy.    Milagros presents to clinic today and reports the following:  -Asthma dating back to childhood.  As a child she was frequently hospitalized - maybe 3x per year.  With advancing age things have been more hit and miss in terms of severity.    -Hospitalized in December 2023 and things have been rough since then.  She feels like if she gets sick she never seems to fully recover.  Feels like she is putting up with more than she probably should and generally feels tired.  -Has been on Trelegy for a number of years but is unsure how well this is working.  Also uses albuterol for rescue - uses this maybe 1x per day.  -Also has nebulizer at home.  Previously using duonebs but now using albuterol.    -Has not been taking 3% nebs and does not recall ever being on these previously.  -Coughing a lot at night recently but this has been better the last few nights.  Cough was not producing phlegm.  No hemoptysis.  Does not usually have a cough though.  -Received an aerobika last time she was  in the hospital but does not have one at home that she uses regularly.  -Over the last few years, estimates that she has been treated 6-8x per year for pneumonia or bronchitis.  -No issues with sinus congestion   -Does have fall and spring allergies.  They have been told she may have a mold allergy based on previous bronchoscopy results.  -No heartburn or acid reflux.    -Never smoker.  -1 dog at home.  -Works for [employer].  Works from home.  -No bird exposure, down bedding, jacuzzi use, recent travel.    Family history notable for father with asbestos related lung disease and also tobacco use.      Review of Systems:  10 of 14 systems reviewed and are negative unless otherwise stated in HPI.    Past Medical History:   Diagnosis Date    Asthma     Bronchiectasis 9/5/08    Hormone replacement therapy 2011    Osteoporosis 2001    followed by PCP (Dr. Springer)    Pneumonia, organism unspecified(486)     Uncomplicated asthma          Current Outpatient Medications:     albuterol (PROAIR HFA/PROVENTIL HFA/VENTOLIN HFA) 108 (90 Base) MCG/ACT inhaler, Inhale 1-2 puffs into the lungs every 6 hours as needed for shortness of breath, wheezing or cough., Disp: , Rfl:     cetirizine (ZYRTEC) 10 MG tablet, Take 10 mg by mouth daily, Disp: , Rfl:     diphenhydrAMINE (BENADRYL) 25 MG capsule, Take 25 mg by mouth at bedtime., Disp: , Rfl:     fluticasone (FLONASE) 50 MCG/ACT nasal spray, Spray 1 spray into both nostrils daily, Disp: 18.2 mL, Rfl: 5    Fluticasone-Umeclidin-Vilanterol (TRELEGY ELLIPTA) 200-62.5-25 MCG/ACT oral inhaler, Inhale 1 puff into the lungs daily, Disp: , Rfl:     ipratropium - albuterol 0.5 mg/2.5 mg/3 mL (DUONEB) 0.5-2.5 (3) MG/3ML neb solution, Take 1 vial by nebulization every 6 hours as needed for shortness of breath, wheezing or cough., Disp: , Rfl:     predniSONE (DELTASONE) 20 MG tablet, Take 2 tablets (40 mg) by mouth daily for 3 days., Disp: 6 tablet, Rfl: 0     "Pseudoephedrine-DM-GG-APAP (DAYQUIL LIQUICAPS OR), Take 1 capsule by mouth daily as needed., Disp: , Rfl:     sodium chloride (NEBUSAL) 3 % neb solution, Take 4 mLs by nebulization every 6 hours., Disp: 480 mL, Rfl: 0    Vitamin D3 (VITAMIN D, CHOLECALCIFEROL,) 25 mcg (1000 units) tablet, Take 1 tablet by mouth daily, Disp: , Rfl:     zinc sulfate (ZINCATE) 220 (50 Zn) MG capsule, Take 220 mg by mouth daily, Disp: , Rfl:       Physical Exam:  /74 (BP Location: Left arm, Patient Position: Sitting, Cuff Size: Adult Large)   Pulse 89   Ht 1.753 m (5' 9\")   Wt 76.7 kg (169 lb)   LMP  (LMP Unknown)   SpO2 93%   BMI 24.96 kg/m    GENERAL: Well developed, well nourished, alert, and in no apparent distress.  HEENT: Normocephalic, atraumatic. PERRL, EOMI. Oral mucosa is moist. No perioral cyanosis.  NECK: supple, no obvious masses.  RESP:  Normal respiratory effort.  Diffuse bilateral low pitched wheezes. No cyanosis or clubbing.  CV: Normal S1, S2, regular rhythm, normal rate. No murmur.  No LE edema.   ABDOMEN: non-distended.   SKIN: warm and dry. No rash.  NEURO: Alert and oriented.  Normal gait.  Fluent speech.  PSYCH: mentation appears normal.     Results (personally reviewed in clinic today):  PFTs: R 71%, FVC 59%, FEV1 54%, %, TLC 81%, DLCO 80%.  Study demonstrates suspected concurrent obstruction and restriction with psedumonormalization of the FVC/FEV1 ratio.  There is air trapping and normal gas exchange.  No significant response to inhaled bronchodilators.  Imaging:  CT Chest 2/11:  1.  Multifocal, new patchy groundglass and nodular opacities in the lungs. New consolidative opacities with worsening bronchial wall thickening is also present in the lower lobes. Several new groundglass nodules are present as described above.  Constellation of findings are suggestive of worsening multifocal pneumonia. Recommend 3 month CT chest follow-up exam to evaluate for resolution.  2.  Stable paramedian " bronchiectasis with associated consolidations in the upper lobes and right middle lobe.  3.  Stable solid pulmonary nodules measuring up to 8 mm in the right lower lobe.      The above note was dictated using voice recognition software and may include typographical errors. Please contact the author for any clarifications.

## 2025-02-18 ENCOUNTER — TELEPHONE (OUTPATIENT)
Dept: PULMONOLOGY | Facility: CLINIC | Age: 65
End: 2025-02-18
Payer: COMMERCIAL

## 2025-02-18 ENCOUNTER — MYC MEDICAL ADVICE (OUTPATIENT)
Dept: PULMONOLOGY | Facility: CLINIC | Age: 65
End: 2025-02-18
Payer: COMMERCIAL

## 2025-02-18 DIAGNOSIS — J47.9 BRONCHIECTASIS WITHOUT COMPLICATION (H): Primary | ICD-10-CM

## 2025-02-18 LAB
A FUMIGATUS IGE QN: 2.57 KU(A)/L
DLCOCOR-%PRED-PRE: 80 %
DLCOCOR-PRE: 17.48 ML/MIN/MMHG
DLCOUNC-%PRED-PRE: 79 %
DLCOUNC-PRE: 17.37 ML/MIN/MMHG
DLCOUNC-PRED: 21.82 ML/MIN/MMHG
ERV-%PRED-PRE: 23 %
ERV-PRE: 0.3 L
ERV-PRED: 1.25 L
EXPTIME-PRE: 6.25 SEC
FEF2575-%PRED-POST: 44 %
FEF2575-%PRED-PRE: 39 %
FEF2575-POST: 0.96 L/SEC
FEF2575-PRE: 0.86 L/SEC
FEF2575-PRED: 2.15 L/SEC
FEFMAX-%PRED-PRE: 71 %
FEFMAX-PRE: 4.82 L/SEC
FEFMAX-PRED: 6.76 L/SEC
FEV1-%PRED-PRE: 54 %
FEV1-PRE: 1.38 L
FEV1FEV6-PRE: 71 %
FEV1FEV6-PRED: 80 %
FEV1FVC-PRE: 71 %
FEV1FVC-PRED: 79 %
FEV1SVC-PRE: 73 %
FEV1SVC-PRED: 69 %
FIFMAX-PRE: 4.27 L/SEC
FRCPLETH-%PRED-PRE: 93 %
FRCPLETH-PRE: 3.11 L
FRCPLETH-PRED: 3.33 L
FVC-%PRED-PRE: 59 %
FVC-PRE: 1.95 L
FVC-PRED: 3.27 L
IC-%PRED-PRE: 63 %
IC-PRE: 1.59 L
IC-PRED: 2.5 L
IGG SERPL-MCNC: 702 MG/DL (ref 610–1616)
IGG SERPL-MCNC: 702 MG/DL (ref 610–1616)
IGG1 SER-MCNC: 387 MG/DL (ref 382–929)
IGG2 SER-MCNC: 249 MG/DL (ref 242–700)
IGG3 SER-MCNC: 39 MG/DL (ref 22–176)
IGG4 SER-MCNC: 27 MG/DL (ref 4–86)
RVPLETH-%PRED-PRE: 126 %
RVPLETH-PRE: 2.81 L
RVPLETH-PRED: 2.22 L
TLCPLETH-%PRED-PRE: 81 %
TLCPLETH-PRE: 4.7 L
TLCPLETH-PRED: 5.78 L
VA-%PRED-PRE: 63 %
VA-PRE: 3.45 L
VC-%PRED-PRE: 51 %
VC-PRE: 1.89 L
VC-PRED: 3.7 L

## 2025-02-18 NOTE — TELEPHONE ENCOUNTER
I contacted Milagros regarding the referral placed for evaluation for cystic fibrosis.  A sweat chloride test and genetic counseling visit were scheduled at her convenience.  Basic instructions were given and she was encouraged to contact us with additional questions or concerns in the meantime.     Va Millard MS, [facility]  Genetic Counselor  [facility]  [facility]

## 2025-02-19 RX ORDER — ALBUTEROL SULFATE 0.83 MG/ML
2.5 SOLUTION RESPIRATORY (INHALATION) EVERY 6 HOURS PRN
Qty: 90 ML | Refills: 5 | Status: SHIPPED | OUTPATIENT
Start: 2025-02-19

## 2025-02-19 NOTE — TELEPHONE ENCOUNTER
Okay to place albuterol nebulizer Rx per Dr Hope. Rx placed. Patient updated.    Jasson Howard RN

## 2025-02-19 NOTE — TELEPHONE ENCOUNTER
Faxed [facility] paperwork to [facility] Management per request of patient. Mailed copy to patient as well.    Jasson Howard RN

## 2025-02-19 NOTE — TELEPHONE ENCOUNTER
07/13/20 0854   Wound Abdomen Left   Date First Assessed/Time First Assessed: 07/13/20 0825   Present on Hospital Admission: No  Primary Wound Type: Abscess  Location: Abdomen  Wound Location Orientation: Left   Dressing Type Applied Gauze;ABD pad  (aquacel ag rope)       Discharge Condition: Stable     Pain: 0    Ambulatory Status: Walking and Cane    Discharge Destination: Home     Transportation: Car    Accompanied by: Self  and Family/Caregiver     Discharge instructions reviewed with Patient and Family/Caregiver  and copy or written instructions have been provided. All questions/concerns have been addressed at this time. Attempted to call patient to explain instructions for airway clearance. Patient did not answer, sent MCM with instructions.    Jasson Howard RN

## 2025-02-20 LAB
A FUMIGATUS IGG SER-MCNC: 57.3 MCG/ML
IGE SERPL-ACNC: 56 KU/L (ref 0–114)

## 2025-02-26 ENCOUNTER — MYC MEDICAL ADVICE (OUTPATIENT)
Dept: OTHER | Facility: CLINIC | Age: 65
End: 2025-02-26
Payer: COMMERCIAL

## 2025-02-26 DIAGNOSIS — J47.9 BRONCHIECTASIS WITHOUT COMPLICATION (H): ICD-10-CM

## 2025-02-26 DIAGNOSIS — J22 LOWER RESPIRATORY INFECTION: Primary | ICD-10-CM

## 2025-02-26 DIAGNOSIS — J96.01 ACUTE RESPIRATORY FAILURE WITH HYPOXIA (H): ICD-10-CM

## 2025-02-26 NOTE — TELEPHONE ENCOUNTER
Per chart review patient was hospitalized 2/10/25-2/13/25 for acute respiratory failure.    Routing to scheduling to coordinate the following:    In person follow up with Dr. Springer  Please schedule this next week     Appt note: Follow up to recent hospitalization.    Cata BREWSTER, RN    [facility]  Office: [phone]  Fax: [phone]

## 2025-03-03 ENCOUNTER — OFFICE VISIT (OUTPATIENT)
Dept: OTHER | Facility: CLINIC | Age: 65
End: 2025-03-03
Attending: INTERNAL MEDICINE
Payer: COMMERCIAL

## 2025-03-03 VITALS
DIASTOLIC BLOOD PRESSURE: 72 MMHG | WEIGHT: 170.6 LBS | OXYGEN SATURATION: 97 % | BODY MASS INDEX: 25.19 KG/M2 | HEART RATE: 95 BPM | SYSTOLIC BLOOD PRESSURE: 108 MMHG

## 2025-03-03 DIAGNOSIS — J47.9 BRONCHIECTASIS WITHOUT COMPLICATION (H): ICD-10-CM

## 2025-03-03 PROCEDURE — 3078F DIAST BP <80 MM HG: CPT | Performed by: INTERNAL MEDICINE

## 2025-03-03 PROCEDURE — 99213 OFFICE O/P EST LOW 20 MIN: CPT | Performed by: INTERNAL MEDICINE

## 2025-03-03 PROCEDURE — G2211 COMPLEX E/M VISIT ADD ON: HCPCS | Performed by: INTERNAL MEDICINE

## 2025-03-03 PROCEDURE — 3074F SYST BP LT 130 MM HG: CPT | Performed by: INTERNAL MEDICINE

## 2025-03-03 PROCEDURE — 99214 OFFICE O/P EST MOD 30 MIN: CPT | Performed by: INTERNAL MEDICINE

## 2025-03-03 NOTE — PROGRESS NOTES
[facility]        Patient is here for a  follow up.    Pt is currently taking no meds that would impact our treatment plan.    /72 (BP Location: Right arm, Patient Position: Chair, Cuff Size: Adult Regular)   Pulse 95   Wt 170 lb 9.6 oz (77.4 kg)   LMP  (LMP Unknown)   SpO2 97%   BMI 25.19 kg/m      The provider has been notified that the patient has no concerns.     Questions patient would like addressed today are: N/A.    Refills are needed: N/A    Has homecare services and agency name:  Susu Sanford MA

## 2025-03-03 NOTE — PROGRESS NOTES
VASCULAR MEDICINE FOLLOW UP VISIT    (J47.9) Bronchiectasis without complication (H)  Comment: doing well  Plan: RTC prn      The longitudinal care of plan for Milagros was addressed during this visit. Due to added complexity of care, we will continue to support Aylin Harding  and the subsequent management of these conditions and with ongoing continuity of care for these conditions.       32 minutes total medical care on today's date.             HPI: Aylin Harding is a 64 year old female with history of asthma, bronchiectasis, prior pneumonia and osteoporosis who presented to the ED 2/10/25 with worsening shortness of breath and hypoxia.  Work up is notable for leukocytosis, with bilateral interstitial opacities concerning for atypical pneumonia. She was admitted and started on supplemental oxygen, nebs, steroid, and azithromycin and cefepime. She was discharged n 2/13/2025 and is feeling much better.         Review Of Systems  Skin: negative  Eyes: negative  Ears/Nose/Throat: negative  Respiratory: Shortness of breath- with exertion at her previous baseline  Cardiovascular: negative  Gastrointestinal: negative  Genitourinary: negative  Musculoskeletal: negative  Neurologic: negative  Psychiatric: negative  Hematologic/Lymphatic/Immunologic: negative  Endocrine: negative    PAST MEDICAL HISTORY:                  Past Medical History:   Diagnosis Date    Asthma     Bronchiectasis 9/5/08    Hormone replacement therapy 2011    Osteoporosis 2001    followed by PCP (Dr. Springer)    Pneumonia, organism unspecified(486)     Uncomplicated asthma        PAST SURGICAL HISTORY:                  Past Surgical History:   Procedure Laterality Date    CARPAL TUNNEL RELEASE RT/LT  2009    [facility] ORAL SURGERY PROCEDURE         CURRENT MEDICATIONS:                  Current Outpatient Medications   Medication Sig Dispense Refill    albuterol (PROAIR HFA/PROVENTIL HFA/VENTOLIN HFA) 108 (90 Base) MCG/ACT inhaler Inhale 1-2  puffs into the lungs every 6 hours as needed for shortness of breath, wheezing or cough.      albuterol (PROVENTIL) (2.5 MG/3ML) 0.083% neb solution Take 1 vial (2.5 mg) by nebulization every 6 hours as needed for shortness of breath, wheezing or cough. 90 mL 5    cetirizine (ZYRTEC) 10 MG tablet Take 10 mg by mouth daily      diphenhydrAMINE (BENADRYL) 25 MG capsule Take 25 mg by mouth at bedtime.      fluticasone (FLONASE) 50 MCG/ACT nasal spray Spray 1 spray into both nostrils daily 18.2 mL 5    Fluticasone-Umeclidin-Vilanterol (TRELEGY ELLIPTA) 200-62.5-25 MCG/ACT oral inhaler Inhale 1 puff into the lungs daily      ipratropium - albuterol 0.5 mg/2.5 mg/3 mL (DUONEB) 0.5-2.5 (3) MG/3ML neb solution Take 1 vial by nebulization every 6 hours as needed for shortness of breath, wheezing or cough.      montelukast (SINGULAIR) 10 MG tablet Take 1 tablet (10 mg) by mouth every evening. 90 tablet 3    Pseudoephedrine-DM-GG-APAP (DAYQUIL LIQUICAPS OR) Take 1 capsule by mouth daily as needed.      sodium chloride (NEBUSAL) 3 % neb solution Take 4 mLs by nebulization every 6 hours. 480 mL 0    Vitamin D3 (VITAMIN D, CHOLECALCIFEROL,) 25 mcg (1000 units) tablet Take 1 tablet by mouth daily      zinc sulfate (ZINCATE) 220 (50 Zn) MG capsule Take 220 mg by mouth daily      sodium chloride (NEBUSAL) 3 % neb solution Take 3 mLs by nebulization every 3 hours as needed for wheezing. 90 mL 5       ALLERGIES:                  Allergies   Allergen Reactions    Mold        SOCIAL HISTORY:                  Social History     Socioeconomic History    Marital status:      Spouse name: kerri    Number of children: 2    Years of education: 14    Highest education level: Not on file   Occupational History    Occupation: manager     Employer: [employer]   Tobacco Use    Smoking status: Never    Smokeless tobacco: Never   Vaping Use    Vaping status: Never Used   Substance and Sexual Activity    Alcohol use: Not Currently      Comment: rare socially    Drug use: No    Sexual activity: Yes     Partners: Male     Birth control/protection: Post-menopausal   Other Topics Concern    Parent/sibling w/ CABG, MI or angioplasty before 65F 55M? Not Asked   Social History Narrative    Not on file     Social Drivers of Health     Financial Resource Strain: Not on file   Food Insecurity: Not on file   Transportation Needs: Not on file   Physical Activity: Not on file   Stress: Not on file   Social Connections: Unknown (7/24/2024)    Received from [facility]    Social Connections     Frequency of Communication with Friends and Family: Not on file   Interpersonal Safety: Low Risk  (2/10/2025)    Interpersonal Safety     Do you feel physically and emotionally safe where you currently live?: Yes     Within the past 12 months, have you been hit, slapped, kicked or otherwise physically hurt by someone?: No     Within the past 12 months, have you been humiliated or emotionally abused in other ways by your partner or ex-partner?: No   Housing Stability: Not on file       FAMILY HISTORY:                   Family History   Problem Relation Age of Onset    Arthritis Mother         b:1937    Cancer Mother     Arthritis Father         b:1934    Family History Negative Sister         2 sisters b:1958,1962    Family History Negative Brother         2 brothers b:1960,1963    No Known Problems Maternal Grandmother     No Known Problems Maternal Grandfather     No Known Problems Paternal Grandmother     No Known Problems Other     No Known Problems Brother     No Known Problems Sister            Physical exam Reveals:    O/P: WNL  HEENT: WNL  NECK: No JVD, thyromegaly, or lymphadenopathy  HEART: RRR, no murmurs, gallops, or rubs  LUNGS: CTA bilaterally without rales, wheezes, or rhonchi; decreased BS  GI: NABS, nondistended, nontender, soft  EXT:without cyanosis, clubbing, or edema  NEURO: nonfocal  : no flank tenderness       All  relevant labs and imaging reviewed by myself on today's date.

## 2025-03-12 ENCOUNTER — HOSPITAL ENCOUNTER (OUTPATIENT)
Dept: CARDIAC REHAB | Facility: CLINIC | Age: 65
Discharge: HOME | End: 2025-03-12
Attending: INTERNAL MEDICINE
Payer: COMMERCIAL

## 2025-03-12 DIAGNOSIS — J47.9 BRONCHIECTASIS WITHOUT COMPLICATION (H): ICD-10-CM

## 2025-03-12 DIAGNOSIS — J45.50 SEVERE PERSISTENT ASTHMA, UNSPECIFIED WHETHER COMPLICATED (H): ICD-10-CM

## 2025-03-12 DIAGNOSIS — R93.89 ABNORMAL CHEST CT: ICD-10-CM

## 2025-03-12 PROCEDURE — G0238 OTH RESP PROC, INDIV: HCPCS

## 2025-03-19 ENCOUNTER — HOSPITAL ENCOUNTER (OUTPATIENT)
Dept: CARDIAC REHAB | Facility: CLINIC | Age: 65
Discharge: HOME | End: 2025-03-19
Attending: INTERNAL MEDICINE
Payer: COMMERCIAL

## 2025-03-19 PROCEDURE — G0238 OTH RESP PROC, INDIV: HCPCS

## 2025-03-27 ENCOUNTER — HOSPITAL ENCOUNTER (OUTPATIENT)
Dept: CARDIAC REHAB | Facility: CLINIC | Age: 65
Discharge: HOME | End: 2025-03-27
Attending: INTERNAL MEDICINE
Payer: COMMERCIAL

## 2025-03-27 PROCEDURE — G0239 OTH RESP PROC, GROUP: HCPCS

## 2025-03-31 ENCOUNTER — MYC REFILL (OUTPATIENT)
Dept: PULMONOLOGY | Facility: CLINIC | Age: 65
End: 2025-03-31
Payer: COMMERCIAL

## 2025-03-31 DIAGNOSIS — J47.9 BRONCHIECTASIS WITHOUT COMPLICATION (H): ICD-10-CM

## 2025-03-31 RX ORDER — ALBUTEROL SULFATE 0.83 MG/ML
2.5 SOLUTION RESPIRATORY (INHALATION) EVERY 6 HOURS PRN
Qty: 540 ML | Refills: 1 | Status: SHIPPED | OUTPATIENT
Start: 2025-03-31

## 2025-03-31 NOTE — TELEPHONE ENCOUNTER
Patient is using albuterol nebulizers BID for airway clearance. Updated Rx for a 90 day supply to reflect usage.    Jasson Howard RN

## 2025-04-01 ENCOUNTER — HOSPITAL ENCOUNTER (OUTPATIENT)
Dept: CARDIAC REHAB | Facility: CLINIC | Age: 65
Discharge: HOME | End: 2025-04-01
Attending: INTERNAL MEDICINE
Payer: COMMERCIAL

## 2025-04-01 PROCEDURE — G0239 OTH RESP PROC, GROUP: HCPCS

## 2025-04-03 ENCOUNTER — HOSPITAL ENCOUNTER (OUTPATIENT)
Dept: CARDIAC REHAB | Facility: CLINIC | Age: 65
Discharge: HOME | End: 2025-04-03
Attending: INTERNAL MEDICINE
Payer: COMMERCIAL

## 2025-04-03 PROCEDURE — G0239 OTH RESP PROC, GROUP: HCPCS

## 2025-04-10 ENCOUNTER — HOSPITAL ENCOUNTER (OUTPATIENT)
Dept: CARDIAC REHAB | Facility: CLINIC | Age: 65
Discharge: HOME | End: 2025-04-10
Attending: INTERNAL MEDICINE
Payer: COMMERCIAL

## 2025-04-10 PROCEDURE — G0239 OTH RESP PROC, GROUP: HCPCS

## 2025-04-15 ENCOUNTER — HOSPITAL ENCOUNTER (OUTPATIENT)
Dept: CARDIAC REHAB | Facility: CLINIC | Age: 65
Discharge: HOME | End: 2025-04-15
Attending: INTERNAL MEDICINE
Payer: COMMERCIAL

## 2025-04-15 PROCEDURE — G0239 OTH RESP PROC, GROUP: HCPCS

## 2025-04-17 ENCOUNTER — HOSPITAL ENCOUNTER (OUTPATIENT)
Dept: CARDIAC REHAB | Facility: CLINIC | Age: 65
Discharge: HOME | End: 2025-04-17
Attending: INTERNAL MEDICINE
Payer: COMMERCIAL

## 2025-04-17 PROCEDURE — G0239 OTH RESP PROC, GROUP: HCPCS

## 2025-04-20 ENCOUNTER — MYC REFILL (OUTPATIENT)
Dept: PULMONOLOGY | Facility: CLINIC | Age: 65
End: 2025-04-20
Payer: COMMERCIAL

## 2025-04-20 DIAGNOSIS — J47.9 BRONCHIECTASIS WITHOUT COMPLICATION (H): ICD-10-CM

## 2025-04-20 DIAGNOSIS — R93.89 ABNORMAL CHEST CT: ICD-10-CM

## 2025-04-20 DIAGNOSIS — J45.50 SEVERE PERSISTENT ASTHMA, UNSPECIFIED WHETHER COMPLICATED (H): ICD-10-CM

## 2025-04-21 RX ORDER — SODIUM CHLORIDE FOR INHALATION 3 %
3 VIAL, NEBULIZER (ML) INHALATION
Qty: 90 ML | Refills: 5 | OUTPATIENT
Start: 2025-04-21

## 2025-04-21 NOTE — TELEPHONE ENCOUNTER
Spoke with [facility]. Nebusal Rx from 2/17/25 is on file and has not been used yet. No refill needed at this time. Pharmacy is able to use Rx from 2/17/25. Pharmacy will need to order medication in. MCM sent to patient with update.    Jasson Howard RN

## 2025-04-22 ENCOUNTER — HOSPITAL ENCOUNTER (OUTPATIENT)
Dept: CARDIAC REHAB | Facility: CLINIC | Age: 65
Discharge: HOME | End: 2025-04-22
Attending: INTERNAL MEDICINE
Payer: COMMERCIAL

## 2025-04-22 PROCEDURE — G0239 OTH RESP PROC, GROUP: HCPCS

## 2025-04-22 PROCEDURE — 93798 PHYS/QHP OP CAR RHAB W/ECG: CPT

## 2025-04-24 ENCOUNTER — HOSPITAL ENCOUNTER (OUTPATIENT)
Dept: CARDIAC REHAB | Facility: CLINIC | Age: 65
Discharge: HOME | End: 2025-04-24
Attending: INTERNAL MEDICINE
Payer: COMMERCIAL

## 2025-04-24 PROCEDURE — G0239 OTH RESP PROC, GROUP: HCPCS

## 2025-04-25 ENCOUNTER — LAB (OUTPATIENT)
Dept: LAB | Facility: CLINIC | Age: 65
End: 2025-04-25
Payer: COMMERCIAL

## 2025-04-25 DIAGNOSIS — J47.9 BRONCHIECTASIS WITHOUT COMPLICATION (H): ICD-10-CM

## 2025-04-25 DIAGNOSIS — R93.89 ABNORMAL CHEST CT: ICD-10-CM

## 2025-04-25 LAB — SWEAT CHLORIDE: NORMAL

## 2025-04-25 PROCEDURE — 89230 COLLECT SWEAT FOR TEST: CPT

## 2025-04-28 ENCOUNTER — MYC MEDICAL ADVICE (OUTPATIENT)
Dept: PULMONOLOGY | Facility: CLINIC | Age: 65
End: 2025-04-28

## 2025-04-28 DIAGNOSIS — J47.9 BRONCHIECTASIS WITHOUT COMPLICATION (H): ICD-10-CM

## 2025-04-28 DIAGNOSIS — J45.50 SEVERE PERSISTENT ASTHMA, UNSPECIFIED WHETHER COMPLICATED (H): ICD-10-CM

## 2025-04-28 DIAGNOSIS — R93.89 ABNORMAL CHEST CT: ICD-10-CM

## 2025-04-29 ENCOUNTER — HOSPITAL ENCOUNTER (OUTPATIENT)
Dept: CARDIAC REHAB | Facility: CLINIC | Age: 65
Discharge: HOME | End: 2025-04-29
Attending: INTERNAL MEDICINE
Payer: COMMERCIAL

## 2025-04-29 PROCEDURE — G0239 OTH RESP PROC, GROUP: HCPCS

## 2025-04-29 RX ORDER — SODIUM CHLORIDE FOR INHALATION 3 %
4 VIAL, NEBULIZER (ML) INHALATION
Qty: 240 ML | Refills: 5 | Status: SHIPPED | OUTPATIENT
Start: 2025-04-29

## 2025-04-29 NOTE — TELEPHONE ENCOUNTER
"Called pharmacy to follow-up. Current Nebusal Rx is as followed. \"Take 3 mLs by nebulization every 3 hours as needed for wheezing\"     Pharmacy notes that they have 4mL vials in stock and will need updated Rx. Rx pended. Patient has been using nebs on average of BID. Rx supply pended to reflect that usage.    Jasson Howard RN    "

## 2025-04-30 ENCOUNTER — MYC REFILL (OUTPATIENT)
Dept: PULMONOLOGY | Facility: CLINIC | Age: 65
End: 2025-04-30
Payer: COMMERCIAL

## 2025-04-30 DIAGNOSIS — R93.89 ABNORMAL CHEST CT: ICD-10-CM

## 2025-04-30 DIAGNOSIS — J45.50 SEVERE PERSISTENT ASTHMA, UNSPECIFIED WHETHER COMPLICATED (H): ICD-10-CM

## 2025-04-30 DIAGNOSIS — J47.9 BRONCHIECTASIS WITHOUT COMPLICATION (H): ICD-10-CM

## 2025-04-30 RX ORDER — MONTELUKAST SODIUM 10 MG/1
10 TABLET ORAL EVERY EVENING
Qty: 90 TABLET | Refills: 3 | OUTPATIENT
Start: 2025-04-30

## 2025-04-30 NOTE — TELEPHONE ENCOUNTER
Requested Prescriptions   Refused Prescriptions Disp Refills    montelukast (SINGULAIR) 10 MG tablet 90 tablet 3     Sig: Take 1 tablet (10 mg) by mouth every evening.       Leukotriene Inhibitors Protocol Failed - 4/30/2025 10:11 AM        Failed - Asthma control assessment score within normal limits in last 6 months     Please review ACT score.           Passed - Patient is age 12 or older     If patient is under 16, ok to refill using age based dosing.           Passed - Medication is active on med list and the sig matches. RN to manually verify dose and sig if red X/fail.     If the protocol passes (green check), you do not need to verify med dose and sig.    A prescription matches if they are the same clinical intention.    For Example: once daily and every morning are the same.    The protocol can not identify upper and lower case letters as matching and will fail.     For Example: Take 1 tablet (50 mg) by mouth daily     TAKE 1 TABLET (50 MG) BY MOUTH DAILY    For all fails (red x), verify dose and sig.    If the refill does match what is on file, the RN can still proceed to approve the refill request.       If they do not match, route to the appropriate provider.             Passed - Recent (6 mo) or future (90 days) visit within the authorizing provider's specialty     The patient must have completed an in-person or virtual visit within the past 6 months or has a future visit scheduled within the next 90 days with the authorizing provider s specialty.  Urgent care and e-visits do not quality as an office visit for this protocol.          Passed - Medication indicated for associated diagnosis     Medication is associated with one or more of the following diagnoses:   Allergies   Asthma   Atopic Dermatitis   Nasal Congestion   Nasal discharge   Rhinitis   Urticaria, chronic   Cystic Fibrosis  Bronchiectasis               Last Written Prescription Date:  2/17/25  Last Fill Quantity: 90 tab,  # refills: 3    Last office visit: 2/17/2025 ; last virtual visit: Visit date not found with prescribing provider:  Dr Hope   Future Office Visit:  6/9/25    Should have refills on file.  Jasson Howard RN

## 2025-05-08 ENCOUNTER — HOSPITAL ENCOUNTER (OUTPATIENT)
Dept: CARDIAC REHAB | Facility: CLINIC | Age: 65
Discharge: HOME | End: 2025-05-08
Attending: INTERNAL MEDICINE
Payer: COMMERCIAL

## 2025-05-08 PROCEDURE — G0239 OTH RESP PROC, GROUP: HCPCS

## 2025-05-12 ENCOUNTER — OFFICE VISIT (OUTPATIENT)
Dept: DERMATOLOGY | Facility: CLINIC | Age: 65
End: 2025-05-12
Payer: COMMERCIAL

## 2025-05-12 DIAGNOSIS — Z12.83 SKIN CANCER SCREENING: ICD-10-CM

## 2025-05-12 DIAGNOSIS — D22.9 MULTIPLE NEVI: ICD-10-CM

## 2025-05-12 DIAGNOSIS — Z80.8 FAMILY HISTORY OF MELANOMA: ICD-10-CM

## 2025-05-12 DIAGNOSIS — L82.1 SEBORRHEIC KERATOSIS: ICD-10-CM

## 2025-05-12 DIAGNOSIS — L81.4 LENTIGO: ICD-10-CM

## 2025-05-12 DIAGNOSIS — L82.0 SEBORRHEIC KERATOSIS, INFLAMED: Primary | ICD-10-CM

## 2025-05-12 PROCEDURE — 1126F AMNT PAIN NOTED NONE PRSNT: CPT | Performed by: STUDENT IN AN ORGANIZED HEALTH CARE EDUCATION/TRAINING PROGRAM

## 2025-05-12 PROCEDURE — 99203 OFFICE O/P NEW LOW 30 MIN: CPT | Mod: 25 | Performed by: STUDENT IN AN ORGANIZED HEALTH CARE EDUCATION/TRAINING PROGRAM

## 2025-05-12 PROCEDURE — 17110 DESTRUCTION B9 LES UP TO 14: CPT | Performed by: STUDENT IN AN ORGANIZED HEALTH CARE EDUCATION/TRAINING PROGRAM

## 2025-05-12 ASSESSMENT — PAIN SCALES - GENERAL: PAINLEVEL_OUTOF10: NO PAIN (0)

## 2025-05-12 NOTE — PROGRESS NOTES
[facility] Dermatology Note  Encounter Date: May 12, 2025  Office Visit     Reviewed patients past medical history and pertinent chart review prior to patients visit today.     Dermatology Problem List:  Waist up skin check: 5/12/2025  # ENRIQUEK, s/p cryo    Personal Hx: No personal history of skin cancer  Family Hx: Positive for non melanoma skin cancer, father.  Positive for malignant melanoma, sister  _________________________________________    Assessment & Plan:     # Inflamed Seborrheic Keratoses, left posterior forearm and right forehead   -The benign nature of the skin lesion(s) was discussed with the patient. The patient requested treatment intervention due to finding the lesions to be bothersome. Discussed cryotherapy treatment with patient. Patient elects to pursue cryotherapy today. After verbal consent and discussion of risks and benefits including but no limited to dyspigmentation/scar, blister, and pain, 2 was(were) treated with 1-2mm freeze border for 2 cycles with liquid nitrogen. Post cryotherapy instructions were provided.     # Family history of melanoma, sister and NMSC, father  # Multiple nevi, trunk and extremities  # Solar lentigines  - Continued observation recommended.   - Nevi demonstrate no concerning features on dermoscopy. We discussed the importance of self exams at home.   - ABCDEs: Counseled ABCDEs of melanoma: Asymmetry, Border (irregularity), Color (not uniform, changes in color), Diameter (greater than 6 mm which is about the size of a pencil eraser), and Evolving (any changes in preexisting moles).  - Sun protection: Counseled SPF 30+ sunscreen, UPF clothing, sun avoidance, tanning bed avoidance.    # Seborrheic keratoses  - We discussed the benign nature of the skin lesions. No treatment required. Continued observation recommended. Follow up with any concerns.      Follow-up:  1 year(s) for follow up full body skin exam, prn for new or changing lesions or new  concerns    All risks, benefits and alternatives were discussed with patient.  Patient is in agreement and understands the assessment and plan.  All questions were answered.  Tia Wheeler PA-C  [facility]  ____________________________________________    CC: Skin Check (Above the waist skin check )    HPI:  Ms. Aylin Harding is a(n) 64 year old female who presents today as a new patient for a waist-up skin cancer screening. Patient has concerns today about a lesion on the right forehead.  This lesion does occasionally itch and become irritated.  It has grown. She also has a lesion on her left posterior forearm but has grown in size.  She does admittedly pick at this. Patient is diligent with photoprotection. She does not have a history of significant sun burns. She rarely used a tanning bed.    Patient is otherwise feeling well, without additional skin concerns.     Physical Exam:  Vitals: LMP  (LMP Unknown)   Breastfeeding No   SKIN: Waist up skin check performed. The exam included the head/face, neck, both arms, chest, back, abdomen, bilateral hands and fingernails.  -right forehead, waxy, stuck on tan to brown plaque  -left posterior forearm, waxy, stuck on pink plaque  -multiple tan/brown flat round macules and raised papules scattered throughout trunk, extremities and head. No worrisome features for malignancy noted on examination.  -scattered tan, homogenous macules scattered on sun exposed areas of trunk, extremities and face.   -scattered waxy, stuck on tan/brown papules and patches on the trunk     - No other lesions of concern on areas examined.     Medications:  Current Outpatient Medications   Medication Sig Dispense Refill    albuterol (PROAIR HFA/PROVENTIL HFA/VENTOLIN HFA) 108 (90 Base) MCG/ACT inhaler Inhale 1-2 puffs into the lungs every 6 hours as needed for shortness of breath, wheezing or cough.      albuterol (PROVENTIL) (2.5 MG/3ML) 0.083% neb solution Take 1 vial  (2.5 mg) by nebulization every 6 hours as needed for shortness of breath, wheezing or cough. 540 mL 1    cetirizine (ZYRTEC) 10 MG tablet Take 10 mg by mouth daily      diphenhydrAMINE (BENADRYL) 25 MG capsule Take 25 mg by mouth at bedtime.      fluticasone (FLONASE) 50 MCG/ACT nasal spray Spray 1 spray into both nostrils daily 18.2 mL 5    Fluticasone-Umeclidin-Vilanterol (TRELEGY ELLIPTA) 200-62.5-25 MCG/ACT oral inhaler Inhale 1 puff into the lungs daily      montelukast (SINGULAIR) 10 MG tablet Take 1 tablet (10 mg) by mouth every evening. 90 tablet 3    sodium chloride (NEBUSAL) 3 % neb solution Take 4 mLs by nebulization every 3 hours as needed for wheezing. 240 mL 5    Vitamin D3 (VITAMIN D, CHOLECALCIFEROL,) 25 mcg (1000 units) tablet Take 1 tablet by mouth daily      zinc sulfate (ZINCATE) 220 (50 Zn) MG capsule Take 220 mg by mouth daily      ipratropium - albuterol 0.5 mg/2.5 mg/3 mL (DUONEB) 0.5-2.5 (3) MG/3ML neb solution Take 1 vial by nebulization every 6 hours as needed for shortness of breath, wheezing or cough. (Patient not taking: Reported on 5/12/2025)      Pseudoephedrine-DM-GG-APAP (DAYQUIL LIQUICAPS OR) Take 1 capsule by mouth daily as needed. (Patient not taking: Reported on 5/12/2025)       No current facility-administered medications for this visit.      Past Medical History:   Patient Active Problem List   Diagnosis    Bronchiectasis (H)    CARDIOVASCULAR SCREENING; LDL GOAL LESS THAN 160    Osteoporosis    Uncomplicated asthma    Right-sided low back pain without sciatica    Bronchiectasis with acute exacerbation (H)    Pulmonary infiltrate    Acute respiratory failure with hypoxia (H)    Pneumonia of both lungs due to infectious organism, unspecified part of lung    Exacerbation of asthma, unspecified asthma severity, unspecified whether persistent    Atypical pneumonia    Sepsis with acute respiratory failure (H)     Past Medical History:   Diagnosis Date    Asthma     Bronchiectasis  09/05/2008    Hormone replacement therapy 2011    Osteoporosis 2001    followed by PCP (Dr. Springer)    Pneumonia, organism unspecified(486)     Uncomplicated asthma        CC Referred Self, MD  No address on file on close of this encounter.

## 2025-05-12 NOTE — PATIENT INSTRUCTIONS
Cryotherapy    What is it?  Use of a very cold liquid, such as liquid nitrogen, to freeze and destroy abnormal skin cells that need to be removed    What should I expect?  Tenderness and redness  A small blister that might grow and fill with dark purple blood. There may be crusting.  More than one treatment may be needed if the lesions do not go away.    How do I care for the treated area?  Gently wash the area with your hands when bathing.  Use a thin layer of Vaseline to help with healing. You may use a Band-Aid.   The area should heal within 7-10 days and may leave behind a pink or lighter color.   Do not use an antibiotic or Neosporin ointment.   You may take acetaminophen (Tylenol) for pain.     Call your doctor if you have:  Severe pain  Signs of infection (warmth, redness, cloudy yellow drainage, and or a bad smell)  Questions or concerns    Who should I call with questions?      [facility]: [phone]      [facility]: [phone]      For urgent needs outside of business hours call the [facility] at [phone] and ask for the dermatology resident on call     Proper skin care from [facility]:    -Eliminate harsh soaps as they strip the natural oils from the skin, often resulting in dry itchy skin ( i.e. Dial, Zest, [place])  -Use mild soaps such as Cetaphil or Dove Sensitive Skin in the shower. You do not need to use soap on arms, legs, and trunk every time you shower unless visibly soiled.   -Avoid hot or cold showers.  -After showering, lightly dry off and apply moisturizing within 2-3 minutes. This will help trap moisture in the skin.   -Aggressive use of a moisturizer at least 1-2 times a day to the entire body (including -Vanicream, Cetaphil, Aquaphor or Cerave) and moisturize hands after every washing.  -We recommend using moisturizers that come in a tub that needs to be scooped out, not a pump. This has more of  an oil base. It will hold moisture in your skin much better than a water base moisturizer. The above recommended are non-pore clogging.      Wear a sunscreen with at least SPF 30 on your face, ears, neck and V of the chest daily. Wear sunscreen on other areas of the body if those areas are exposed to the sun throughout the day. Sunscreens can contain physical and/or chemical blockers. Physical blockers are less likely to clog pores, these include zinc oxide and titanium dioxide. Reapply every two hour and after swimming.     Sunscreen examples: https://www.ewg.org/sunscreen/    UV radiation  UVA radiation remains constant throughout the day and throughout the year. It is a longer wavelength than UVB and therefore penetrates deeper into the skin leading to immediate and delayed tanning, photoaging, and skin cancer. 70-80% of UVA and UVB radiation occurs between the hours of 10am-2pm.  UVB radiation  UVB radiation causes the most harmful effects and is more significant during the summer months. However, snow and ice can reflect UVB radiation leading to skin damage during the winter months as well. UVB radiation is responsible for tanning, burning, inflammation, delayed erythema (pinkness), pigmentation (brown spots), and skin cancer.     I recommend self monthly full body exams and yearly full body exams with a dermatology provider. If you develop a new or changing lesion please follow up for examination. Most skin cancers are pink and scaly or pink and pearly. However, we do see blue/brown/black skin cancers.  Consider the ABCDEs of melanoma when giving yourself your monthly full body exam ( don't forget the groin, buttocks, feet, toes, etc). A-asymmetry, B-borders, C-color, D-diameter, E-elevation or evolving. If you see any of these changes please follow up in clinic. If you cannot see your back I recommend purchasing a hand held mirror to use with a larger wall mirror.       Checking for Skin Cancer  You can find  cancer early by checking your skin each month. There are 3 kinds of skin cancer. They are melanoma, basal cell carcinoma, and squamous cell carcinoma. Doing monthly skin checks is the best way to find new marks or skin changes. Follow the instructions below for checking your skin.   The ABCDEs of checking moles for melanoma   Check your moles or growths for signs of melanoma using ABCDE:   Asymmetry: the sides of the mole or growth don t match  Border: the edges are ragged, notched, or blurred  Color: the color within the mole or growth varies  Diameter: the mole or growth is larger than 6 mm (size of a pencil eraser)  Evolving: the size, shape, or color of the mole or growth is changing (evolving is not shown in the images below)    Checking for other types of skin cancer  Basal cell carcinoma or squamous cell carcinoma have symptoms such as:     A spot or mole that looks different from all other marks on your skin  Changes in how an area feels, such as itching, tenderness, or pain  Changes in the skin's surface, such as oozing, bleeding, or scaliness  A sore that does not heal  New swelling or redness beyond the border of a mole    Who s at risk?  Anyone can get skin cancer. But you are at greater risk if you have:   Fair skin, light-colored hair, or light-colored eyes  Many moles or abnormal moles on your skin  A history of sunburns from sunlight or tanning beds  A family history of skin cancer  A history of exposure to radiation or chemicals  A weakened immune system  If you have had skin cancer in the past, you are at risk for recurring skin cancer.   How to check your skin  Do your monthly skin checkups in front of a full-length mirror. Check all parts of your body, including your:   Head (ears, face, neck, and scalp)  Torso (front, back, and sides)  Arms (tops, undersides, upper, and lower armpits)  Hands (palms, backs, and fingers, including under the nails)  Buttocks and genitals  Legs (front, back, and  sides)  Feet (tops, soles, toes, including under the nails, and between toes)  If you have a lot of moles, take digital photos of them each month. Make sure to take photos both up close and from a distance. These can help you see if any moles change over time.   Most skin changes are not cancer. But if you see any changes in your skin, call your doctor right away. Only he or she can diagnose a problem. If you have skin cancer, seeing your doctor can be the first step toward getting the treatment that could save your life.   [employer] last reviewed this educational content on 4/1/2019 2000-2020 [employer]. [address] [place] [address], [place] [address]. All rights reserved. This information is not intended as a substitute for professional medical care. Always follow your healthcare professional's instructions.       When should I call my doctor?  If you are worsening or not improving, please, contact us or seek urgent care as noted below.     Who should I call with questions (adults)?    [facility] [phone]  For urgent needs outside of business hours call the [facility] at [phone] and ask for the dermatology resident on call to be paged  If this is a medical emergency and you are unable to reach an ER, Call [phone]      If you need a prescription refill, please contact your pharmacy. Refills are approved or denied by our Physicians during normal business hours, Monday through Friday.  Per office policy, refills will not be granted if you have not been seen within the past year (or sooner depending on the condition).

## 2025-05-12 NOTE — LETTER
5/12/2025      Aylin MUSTAPHA Meaghan  [address] [place] [address]      Dear Colleague,    Thank you for referring your patient, Aylin Harding, to the [facility]. Please see a copy of my visit note below.    [facility] Dermatology Note  Encounter Date: May 12, 2025  Office Visit     Reviewed patients past medical history and pertinent chart review prior to patients visit today.     Dermatology Problem List:  Waist up skin check: 5/12/2025  # ISK, s/p cryo    Personal Hx: No personal history of skin cancer  Family Hx: Positive for non melanoma skin cancer, father.  Positive for malignant melanoma, sister  _________________________________________    Assessment & Plan:     # Inflamed Seborrheic Keratoses, left posterior forearm and right forehead   -The benign nature of the skin lesion(s) was discussed with the patient. The patient requested treatment intervention due to finding the lesions to be bothersome. Discussed cryotherapy treatment with patient. Patient elects to pursue cryotherapy today. After verbal consent and discussion of risks and benefits including but no limited to dyspigmentation/scar, blister, and pain, 2 was(were) treated with 1-2mm freeze border for 2 cycles with liquid nitrogen. Post cryotherapy instructions were provided.     # Family history of melanoma, sister and NMSC, father  # Multiple nevi, trunk and extremities  # Solar lentigines  - Continued observation recommended.   - Nevi demonstrate no concerning features on dermoscopy. We discussed the importance of self exams at home.   - ABCDEs: Counseled ABCDEs of melanoma: Asymmetry, Border (irregularity), Color (not uniform, changes in color), Diameter (greater than 6 mm which is about the size of a pencil eraser), and Evolving (any changes in preexisting moles).  - Sun protection: Counseled SPF 30+ sunscreen, UPF clothing, sun avoidance, tanning bed avoidance.    #  Seborrheic keratoses  - We discussed the benign nature of the skin lesions. No treatment required. Continued observation recommended. Follow up with any concerns.      Follow-up:  1 year(s) for follow up full body skin exam, prn for new or changing lesions or new concerns    All risks, benefits and alternatives were discussed with patient.  Patient is in agreement and understands the assessment and plan.  All questions were answered.  Tia Wheeler PA-C  [facility]  ____________________________________________    CC: Skin Check (Above the waist skin check )    HPI:  Ms. Aylin Harding is a(n) 64 year old female who presents today as a new patient for a waist-up skin cancer screening. Patient has concerns today about a lesion on the right forehead.  This lesion does occasionally itch and become irritated.  It has grown. She also has a lesion on her left posterior forearm but has grown in size.  She does admittedly pick at this. Patient is diligent with photoprotection. She does not have a history of significant sun burns. She rarely used a tanning bed.    Patient is otherwise feeling well, without additional skin concerns.     Physical Exam:  Vitals: LMP  (LMP Unknown)   Breastfeeding No   SKIN: Waist up skin check performed. The exam included the head/face, neck, both arms, chest, back, abdomen, bilateral hands and fingernails.  -right forehead, waxy, stuck on tan to brown plaque  -left posterior forearm, waxy, stuck on pink plaque  -multiple tan/brown flat round macules and raised papules scattered throughout trunk, extremities and head. No worrisome features for malignancy noted on examination.  -scattered tan, homogenous macules scattered on sun exposed areas of trunk, extremities and face.   -scattered waxy, stuck on tan/brown papules and patches on the trunk     - No other lesions of concern on areas examined.     Medications:  Current Outpatient Medications   Medication Sig  Dispense Refill     albuterol (PROAIR HFA/PROVENTIL HFA/VENTOLIN HFA) 108 (90 Base) MCG/ACT inhaler Inhale 1-2 puffs into the lungs every 6 hours as needed for shortness of breath, wheezing or cough.       albuterol (PROVENTIL) (2.5 MG/3ML) 0.083% neb solution Take 1 vial (2.5 mg) by nebulization every 6 hours as needed for shortness of breath, wheezing or cough. 540 mL 1     cetirizine (ZYRTEC) 10 MG tablet Take 10 mg by mouth daily       diphenhydrAMINE (BENADRYL) 25 MG capsule Take 25 mg by mouth at bedtime.       fluticasone (FLONASE) 50 MCG/ACT nasal spray Spray 1 spray into both nostrils daily 18.2 mL 5     Fluticasone-Umeclidin-Vilanterol (TRELEGY ELLIPTA) 200-62.5-25 MCG/ACT oral inhaler Inhale 1 puff into the lungs daily       montelukast (SINGULAIR) 10 MG tablet Take 1 tablet (10 mg) by mouth every evening. 90 tablet 3     sodium chloride (NEBUSAL) 3 % neb solution Take 4 mLs by nebulization every 3 hours as needed for wheezing. 240 mL 5     Vitamin D3 (VITAMIN D, CHOLECALCIFEROL,) 25 mcg (1000 units) tablet Take 1 tablet by mouth daily       zinc sulfate (ZINCATE) 220 (50 Zn) MG capsule Take 220 mg by mouth daily       ipratropium - albuterol 0.5 mg/2.5 mg/3 mL (DUONEB) 0.5-2.5 (3) MG/3ML neb solution Take 1 vial by nebulization every 6 hours as needed for shortness of breath, wheezing or cough. (Patient not taking: Reported on 5/12/2025)       Pseudoephedrine-DM-GG-APAP (DAYQUIL LIQUICAPS OR) Take 1 capsule by mouth daily as needed. (Patient not taking: Reported on 5/12/2025)       No current facility-administered medications for this visit.      Past Medical History:   Patient Active Problem List   Diagnosis     Bronchiectasis (H)     CARDIOVASCULAR SCREENING; LDL GOAL LESS THAN 160     Osteoporosis     Uncomplicated asthma     Right-sided low back pain without sciatica     Bronchiectasis with acute exacerbation (H)     Pulmonary infiltrate     Acute respiratory failure with hypoxia (H)     Pneumonia of  both lungs due to infectious organism, unspecified part of lung     Exacerbation of asthma, unspecified asthma severity, unspecified whether persistent     Atypical pneumonia     Sepsis with acute respiratory failure (H)     Past Medical History:   Diagnosis Date     Asthma      Bronchiectasis 09/05/2008     Hormone replacement therapy 2011     Osteoporosis 2001    followed by PCP (Dr. Springer)     Pneumonia, organism unspecified(486)      Uncomplicated asthma        CC Referred Self, MD  No address on file on close of this encounter.    Again, thank you for allowing me to participate in the care of your patient.        Sincerely,        Valorie Wheeler PA-C    Electronically signed

## 2025-05-15 ENCOUNTER — HOSPITAL ENCOUNTER (OUTPATIENT)
Dept: CARDIAC REHAB | Facility: CLINIC | Age: 65
Discharge: HOME | End: 2025-05-15
Attending: INTERNAL MEDICINE
Payer: COMMERCIAL

## 2025-05-15 PROCEDURE — G0238 OTH RESP PROC, INDIV: HCPCS

## 2025-05-19 ENCOUNTER — ANCILLARY PROCEDURE (OUTPATIENT)
Dept: CT IMAGING | Facility: CLINIC | Age: 65
End: 2025-05-19
Attending: INTERNAL MEDICINE
Payer: COMMERCIAL

## 2025-05-19 DIAGNOSIS — J47.9 BRONCHIECTASIS WITHOUT COMPLICATION (H): ICD-10-CM

## 2025-05-19 DIAGNOSIS — R93.89 ABNORMAL CHEST CT: ICD-10-CM

## 2025-05-19 DIAGNOSIS — J45.50 SEVERE PERSISTENT ASTHMA, UNSPECIFIED WHETHER COMPLICATED (H): ICD-10-CM

## 2025-05-19 PROCEDURE — 71250 CT THORAX DX C-: CPT

## 2025-05-20 ENCOUNTER — HOSPITAL ENCOUNTER (OUTPATIENT)
Dept: CARDIAC REHAB | Facility: CLINIC | Age: 65
Discharge: HOME | End: 2025-05-20
Attending: INTERNAL MEDICINE
Payer: COMMERCIAL

## 2025-05-20 PROCEDURE — G0239 OTH RESP PROC, GROUP: HCPCS

## 2025-05-23 ENCOUNTER — RESULTS FOLLOW-UP (OUTPATIENT)
Dept: MULTI SPECIALTY CLINIC | Facility: CLINIC | Age: 65
End: 2025-05-23

## 2025-05-23 DIAGNOSIS — J47.9 BRONCHIECTASIS WITHOUT COMPLICATION (H): Primary | ICD-10-CM

## 2025-05-23 DIAGNOSIS — R91.8 PULMONARY NODULES: ICD-10-CM

## 2025-06-09 ENCOUNTER — OFFICE VISIT (OUTPATIENT)
Dept: PULMONOLOGY | Facility: CLINIC | Age: 65
End: 2025-06-09
Attending: INTERNAL MEDICINE
Payer: COMMERCIAL

## 2025-06-09 VITALS
OXYGEN SATURATION: 99 % | HEART RATE: 81 BPM | WEIGHT: 171 LBS | DIASTOLIC BLOOD PRESSURE: 77 MMHG | SYSTOLIC BLOOD PRESSURE: 113 MMHG | RESPIRATION RATE: 18 BRPM | BODY MASS INDEX: 25.25 KG/M2

## 2025-06-09 DIAGNOSIS — J45.40 MODERATE PERSISTENT ASTHMA, UNSPECIFIED WHETHER COMPLICATED: ICD-10-CM

## 2025-06-09 DIAGNOSIS — J47.9 BRONCHIECTASIS WITHOUT COMPLICATION (H): Primary | ICD-10-CM

## 2025-06-09 PROCEDURE — 3074F SYST BP LT 130 MM HG: CPT | Performed by: INTERNAL MEDICINE

## 2025-06-09 PROCEDURE — 99215 OFFICE O/P EST HI 40 MIN: CPT | Performed by: INTERNAL MEDICINE

## 2025-06-09 PROCEDURE — 3078F DIAST BP <80 MM HG: CPT | Performed by: INTERNAL MEDICINE

## 2025-06-09 RX ORDER — ALBUTEROL SULFATE 90 UG/1
1-2 INHALANT RESPIRATORY (INHALATION) EVERY 6 HOURS PRN
Qty: 18 G | Refills: 4 | Status: SHIPPED | OUTPATIENT
Start: 2025-06-09

## 2025-06-09 ASSESSMENT — ASTHMA QUESTIONNAIRES
HOSPITALIZATION_OVERNIGHT_LAST_YEAR_TOTAL: ONE
QUESTION_1 LAST FOUR WEEKS HOW MUCH OF THE TIME DID YOUR ASTHMA KEEP YOU FROM GETTING AS MUCH DONE AT WORK, SCHOOL OR AT HOME: A LITTLE OF THE TIME
ACT_TOTALSCORE: 17
QUESTION_3 LAST FOUR WEEKS HOW OFTEN DID YOUR ASTHMA SYMPTOMS (WHEEZING, COUGHING, SHORTNESS OF BREATH, CHEST TIGHTNESS OR PAIN) WAKE YOU UP AT NIGHT OR EARLIER THAN USUAL IN THE MORNING: ONCE OR TWICE
ACT_TOTALSCORE: 17
QUESTION_5 LAST FOUR WEEKS HOW WOULD YOU RATE YOUR ASTHMA CONTROL: WELL CONTROLLED
EMERGENCY_ROOM_LAST_YEAR_TOTAL: ONE
QUESTION_2 LAST FOUR WEEKS HOW OFTEN HAVE YOU HAD SHORTNESS OF BREATH: MORE THAN ONCE A DAY
QUESTION_4 LAST FOUR WEEKS HOW OFTEN HAVE YOU USED YOUR RESCUE INHALER OR NEBULIZER MEDICATION (SUCH AS ALBUTEROL): ONCE A WEEK OR LESS

## 2025-06-09 NOTE — PATIENT INSTRUCTIONS
-Continue Trelegy 1 puff once daily and Singulair 1 pill daily.  -Continue airway clearance with albuterol + 3% nebs 2-4 times daily with aerobika.  -Get a follow up CT in mid-late August for your pulmonary nodules and schedule a virtual return visit with Yashira around this time.    Virtual visit with Yashira for nodule follow up in late August then return to clinic with me in 6 months in person.

## 2025-06-09 NOTE — PROGRESS NOTES
[facility] SPECIALTY [address] [address]  Phone: [phone]  Fax: [phone]    Patient:  Aylin Harding, Date of birth 1960  Date of Visit:  06/09/2025    Pulmonary Clinic New Patient Consult  Assessment and Plan:   Aylin Harding is a 64 year old female with a history of asthma and bronchiectasis who presents to pulmonary clinic today for follow up.  Asthma. Long standing history dating back to childhood.  FEV1 54% suggestive of moderate obstruction related to asthma and/or bronchiectasis.  ABPA and Quant IgGs returned normal.  She is doing well since our last visit with improved symptoms and imaging and no recent exacerbations.  She will continue on high dose Trelegy and Singulair 10 mg daily.  If recurrent exacerbations in the future, I would consider a trial of an asthma biologic or chronic azithromycin given the possible overlap with bronchiectasis.  Bronchiectasis.  Extensive serologic and infectious work up sent and returned negative.  Some pockets of bronchiectasis on imaging seem to have a more chronic and almost fibrotic appearance which may help explain some of the restrictive component on PFTs.  Again, it's difficult to know if bronchiectasis vs asthma is primary  of symptoms and historic issues with acute exacerbations.  She is doing quite well on airway clearance with albuterol nebs followed by 3% saline nebs and aerobika at least twice daily, ramping up to q4h prn if ill and we will plan to continue this.  Pulmonary nodule. Infectious and inflammatory changes on February's CT are overall improved on May's CT.  Many nodules are stable or resolved, however there is a new 8 mm nodule which requires short term interval surveillance. Plan to repeat CT chest in August and will attempt to coordinate a return visit with Yashira around that time.  Deconditioning. Much improved with pulmonary rehab.  Encouraged to keep  exercising.  Decreased but adequate resting oximetry.  Resolved; SpO2 99% today.  No concerns regarding O2 levels at pulmonary rehab either.  Questions and concerns were answered to the patient's satisfaction.  she was provided with my contact information should new questions or concerns arise in the interim.  she should return to clinic in August with repeat CT Chest and a virtual visit with Yashira and also in 6 months in person for follow up with me.  Up to date on PCV (2024), RSV (2023), seasonal flu vaccine.  COVID booster would be recommended if not already received.    Yamile Hope MD  Pulmonary and Critical Care Medicine    I spent 51 minutes on the date of encounter doing chart review, review of outside records, review of test results, conducting the patient visit, completing documentation and further activities as noted above.    History of Present Illness    Aylin Harding is a 64 year old female with a history of asthma and bronchiectasis who presents to pulmonary clinic today for follow up.  To briefly review, she was previously followed by [facility] before transitioning to care with me in February 2025.  PFTs have shown mixed obstruction and restriction.  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  Bronchiectasis evaluation has included sweat chloride testing, ABPA work up, and IgGs which all returned normal. We last visited in February following a stint of recurrent exacerbations.  She was continued on Trelegy 200 and singulair and airway clearance with 3% nebs and aerobika were added.  She was also referred to pulmonary rehab.      Milagros presents to clinic today and reports the following:  -Completed about 7 sessions of pulmonary rehab with good effect.  Taking a break until later.  -Overall feeling much better than our last visit.  -Wheezing less overall compared with before.  -No issues with exacerbation since our last  visit.    -Taking Trelegy and Singulair daily.  -Doing airway clearance with albuterol + 3% nebs 1-2x daily. This has been helpful with airway clearance.  Phlegm is light yellow in color usually.  No hemoptysis.   -Taking Flonase and Zyrtec daily for allergies and benadryl at night.  -Experiencing some voice hoarseness but this is baseline for her.      -Does have fall and spring allergies.  They have been told she may have a mold allergy based on previous bronchoscopy results.  -No heartburn or acid reflux.    -Never smoker.        Review of Systems:  10 of 14 systems reviewed and are negative unless otherwise stated in HPI.    Past Medical History:   Diagnosis Date    Asthma     Bronchiectasis 09/05/2008    Hormone replacement therapy 2011    Osteoporosis 2001    followed by PCP (Dr. Springer)    Pneumonia, organism unspecified(486)     Uncomplicated asthma          Current Outpatient Medications:     albuterol (PROAIR HFA/PROVENTIL HFA/VENTOLIN HFA) 108 (90 Base) MCG/ACT inhaler, Inhale 1-2 puffs into the lungs every 6 hours as needed for shortness of breath, wheezing or cough., Disp: , Rfl:     albuterol (PROVENTIL) (2.5 MG/3ML) 0.083% neb solution, Take 1 vial (2.5 mg) by nebulization every 6 hours as needed for shortness of breath, wheezing or cough., Disp: 540 mL, Rfl: 1    cetirizine (ZYRTEC) 10 MG tablet, Take 10 mg by mouth daily, Disp: , Rfl:     diphenhydrAMINE (BENADRYL) 25 MG capsule, Take 25 mg by mouth at bedtime., Disp: , Rfl:     fluticasone (FLONASE) 50 MCG/ACT nasal spray, Spray 1 spray into both nostrils daily, Disp: 18.2 mL, Rfl: 5    Fluticasone-Umeclidin-Vilanterol (TRELEGY ELLIPTA) 200-62.5-25 MCG/ACT oral inhaler, Inhale 1 puff into the lungs daily, Disp: , Rfl:     ipratropium - albuterol 0.5 mg/2.5 mg/3 mL (DUONEB) 0.5-2.5 (3) MG/3ML neb solution, Take 1 vial by nebulization every 6 hours as needed for shortness of breath, wheezing or cough. (Patient not taking: Reported on 5/12/2025), Disp:  , Rfl:     montelukast (SINGULAIR) 10 MG tablet, Take 1 tablet (10 mg) by mouth every evening., Disp: 90 tablet, Rfl: 3    Pseudoephedrine-DM-GG-APAP (DAYQUIL LIQUICAPS OR), Take 1 capsule by mouth daily as needed. (Patient not taking: Reported on 5/12/2025), Disp: , Rfl:     sodium chloride (NEBUSAL) 3 % neb solution, Take 4 mLs by nebulization every 3 hours as needed for wheezing., Disp: 240 mL, Rfl: 5    Vitamin D3 (VITAMIN D, CHOLECALCIFEROL,) 25 mcg (1000 units) tablet, Take 1 tablet by mouth daily, Disp: , Rfl:     zinc sulfate (ZINCATE) 220 (50 Zn) MG capsule, Take 220 mg by mouth daily, Disp: , Rfl:       Physical Exam:  /77 (BP Location: Left arm, Patient Position: Sitting, Cuff Size: Adult Large)   Pulse 81   Resp 18   Wt 77.6 kg (171 lb)   LMP  (LMP Unknown)   SpO2 99%   BMI 25.25 kg/m    GENERAL: Well developed, well nourished, alert, and in no apparent distress.  HEENT: Normocephalic, atraumatic. PERRL, EOMI. Oral mucosa is moist. No perioral cyanosis.  NECK: supple, no obvious masses.  RESP:  Normal respiratory effort.  Occasional coarse breath sounds but generally CTAB.  No cyanosis or clubbing.  CV: Normal S1, S2, regular rhythm, normal rate. No murmur.  No LE edema.   ABDOMEN: non-distended.   SKIN: warm and dry. No rash.  NEURO: Alert and oriented.  Normal gait.  Fluent speech.  PSYCH: mentation appears normal.     Results (personally reviewed in clinic today):  CT Chest 5/19:   1.  Previously noted bilateral pulmonary consolidation shows improvement.  2.  Multiple bilateral indeterminant pulmonary nodules. A few may have been previously obscured by air-space consolidation. However, there appears to be a new 8 mm solid left lower lobe nodule. There are also multiple stable nodules. Recommend follow-up short interval CT chest in three months for surveillance.  3.  Bilateral regions of bronchiectasis and volume loss along the anteromedial lungs appear stable.    The above note was  dictated using voice recognition software and may include typographical errors. Please contact the author for any clarifications.

## 2025-06-09 NOTE — LETTER
6/9/2025      Aylin Harding  [address] [place] [address]      Dear Colleague,    Thank you for referring your patient, Aylin Harding, to the [facility]. Please see a copy of my visit note below.      [facility]  [address] [place] [address]  [facility] [address]  Phone: [phone]  Fax: [phone]    Patient:  Aylin Harding, Date of birth 1960  Date of Visit:  06/09/2025    Pulmonary Clinic New Patient Consult  Assessment and Plan:   Aylin Harding is a 64 year old female with a history of asthma and bronchiectasis who presents to pulmonary clinic today for follow up.  Asthma. Long standing history dating back to childhood.  FEV1 54% suggestive of moderate obstruction related to asthma and/or bronchiectasis.  ABPA and Quant IgGs returned normal.  She is doing well since our last visit with improved symptoms and imaging and no recent exacerbations.  She will continue on high dose Trelegy and Singulair 10 mg daily.  If recurrent exacerbations in the future, I would consider a trial of an asthma biologic or chronic azithromycin given the possible overlap with bronchiectasis.  Bronchiectasis.  Extensive serologic and infectious work up sent and returned negative.  Some pockets of bronchiectasis on imaging seem to have a more chronic and almost fibrotic appearance which may help explain some of the restrictive component on PFTs.  Again, it's difficult to know if bronchiectasis vs asthma is primary  of symptoms and historic issues with acute exacerbations.  She is doing quite well on airway clearance with albuterol nebs followed by 3% saline nebs and aerobika at least twice daily, ramping up to q4h prn if ill and we will plan to continue this.  Pulmonary nodule. Infectious and inflammatory changes on February's CT are overall improved on May's CT.  Many nodules are stable or resolved, however  there is a new 8 mm nodule which requires short term interval surveillance. Plan to repeat CT chest in August and will attempt to coordinate a return visit with Yashira around that time.  Deconditioning. Much improved with pulmonary rehab.  Encouraged to keep exercising.  Decreased but adequate resting oximetry.  Resolved; SpO2 99% today.  No concerns regarding O2 levels at pulmonary rehab either.  Questions and concerns were answered to the patient's satisfaction.  she was provided with my contact information should new questions or concerns arise in the interim.  she should return to clinic in August with repeat CT Chest and a virtual visit with Yashira and also in 6 months in person for follow up with me.  Up to date on PCV (2024), RSV (2023), seasonal flu vaccine.  COVID booster would be recommended if not already received.    Yamile Hope MD  Pulmonary and Critical Care Medicine    I spent 51 minutes on the date of encounter doing chart review, review of outside records, review of test results, conducting the patient visit, completing documentation and further activities as noted above.    History of Present Illness    Aylin Harding is a 64 year old female with a history of asthma and bronchiectasis who presents to pulmonary clinic today for follow up.  To briefly review, she was previously followed by [facility] before transitioning to care with me in February 2025.  PFTs have shown mixed obstruction and restriction.  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  Bronchiectasis evaluation has included sweat chloride testing, ABPA work up, and IgGs which all returned normal. We last visited in February following a stint of recurrent exacerbations.  She was continued on Trelegy 200 and singulair and airway clearance with 3% nebs and aerobika were added.  She was also referred to pulmonary rehab.      Milagros presents to clinic today and  reports the following:  -Completed about 7 sessions of pulmonary rehab with good effect.  Taking a break until later.  -Overall feeling much better than our last visit.  -Wheezing less overall compared with before.  -No issues with exacerbation since our last visit.    -Taking Trelegy and Singulair daily.  -Doing airway clearance with albuterol + 3% nebs 1-2x daily. This has been helpful with airway clearance.  Phlegm is light yellow in color usually.  No hemoptysis.   -Taking Flonase and Zyrtec daily for allergies and benadryl at night.  -Experiencing some voice hoarseness but this is baseline for her.      -Does have fall and spring allergies.  They have been told she may have a mold allergy based on previous bronchoscopy results.  -No heartburn or acid reflux.    -Never smoker.        Review of Systems:  10 of 14 systems reviewed and are negative unless otherwise stated in HPI.    Past Medical History:   Diagnosis Date     Asthma      Bronchiectasis 09/05/2008     Hormone replacement therapy 2011     Osteoporosis 2001    followed by PCP (Dr. Springer)     Pneumonia, organism unspecified(486)      Uncomplicated asthma          Current Outpatient Medications:      albuterol (PROAIR HFA/PROVENTIL HFA/VENTOLIN HFA) 108 (90 Base) MCG/ACT inhaler, Inhale 1-2 puffs into the lungs every 6 hours as needed for shortness of breath, wheezing or cough., Disp: , Rfl:      albuterol (PROVENTIL) (2.5 MG/3ML) 0.083% neb solution, Take 1 vial (2.5 mg) by nebulization every 6 hours as needed for shortness of breath, wheezing or cough., Disp: 540 mL, Rfl: 1     cetirizine (ZYRTEC) 10 MG tablet, Take 10 mg by mouth daily, Disp: , Rfl:      diphenhydrAMINE (BENADRYL) 25 MG capsule, Take 25 mg by mouth at bedtime., Disp: , Rfl:      fluticasone (FLONASE) 50 MCG/ACT nasal spray, Spray 1 spray into both nostrils daily, Disp: 18.2 mL, Rfl: 5     Fluticasone-Umeclidin-Vilanterol (TRELEGY ELLIPTA) 200-62.5-25 MCG/ACT oral inhaler, Inhale 1  puff into the lungs daily, Disp: , Rfl:      ipratropium - albuterol 0.5 mg/2.5 mg/3 mL (DUONEB) 0.5-2.5 (3) MG/3ML neb solution, Take 1 vial by nebulization every 6 hours as needed for shortness of breath, wheezing or cough. (Patient not taking: Reported on 5/12/2025), Disp: , Rfl:      montelukast (SINGULAIR) 10 MG tablet, Take 1 tablet (10 mg) by mouth every evening., Disp: 90 tablet, Rfl: 3     Pseudoephedrine-DM-GG-APAP (DAYQUIL LIQUICAPS OR), Take 1 capsule by mouth daily as needed. (Patient not taking: Reported on 5/12/2025), Disp: , Rfl:      sodium chloride (NEBUSAL) 3 % neb solution, Take 4 mLs by nebulization every 3 hours as needed for wheezing., Disp: 240 mL, Rfl: 5     Vitamin D3 (VITAMIN D, CHOLECALCIFEROL,) 25 mcg (1000 units) tablet, Take 1 tablet by mouth daily, Disp: , Rfl:      zinc sulfate (ZINCATE) 220 (50 Zn) MG capsule, Take 220 mg by mouth daily, Disp: , Rfl:       Physical Exam:  /77 (BP Location: Left arm, Patient Position: Sitting, Cuff Size: Adult Large)   Pulse 81   Resp 18   Wt 77.6 kg (171 lb)   LMP  (LMP Unknown)   SpO2 99%   BMI 25.25 kg/m    GENERAL: Well developed, well nourished, alert, and in no apparent distress.  HEENT: Normocephalic, atraumatic. PERRL, EOMI. Oral mucosa is moist. No perioral cyanosis.  NECK: supple, no obvious masses.  RESP:  Normal respiratory effort.  Occasional coarse breath sounds but generally CTAB.  No cyanosis or clubbing.  CV: Normal S1, S2, regular rhythm, normal rate. No murmur.  No LE edema.   ABDOMEN: non-distended.   SKIN: warm and dry. No rash.  NEURO: Alert and oriented.  Normal gait.  Fluent speech.  PSYCH: mentation appears normal.     Results (personally reviewed in clinic today):  CT Chest 5/19:   1.  Previously noted bilateral pulmonary consolidation shows improvement.  2.  Multiple bilateral indeterminant pulmonary nodules. A few may have been previously obscured by air-space consolidation. However, there appears to be a new 8  mm solid left lower lobe nodule. There are also multiple stable nodules. Recommend follow-up short interval CT chest in three months for surveillance.  3.  Bilateral regions of bronchiectasis and volume loss along the anteromedial lungs appear stable.    The above note was dictated using voice recognition software and may include typographical errors. Please contact the author for any clarifications.                                      Again, thank you for allowing me to participate in the care of your patient.        Sincerely,        Aaliyah Hope MD    Electronically signed

## 2025-06-09 NOTE — NURSING NOTE
Chief Complaint   Patient presents with    Follow Up     Bronchiectasis without complication (H) +2 more       Vitals:    06/09/25 1447   BP: 113/77   BP Location: Left arm   Patient Position: Sitting   Cuff Size: Adult Large   Pulse: 81   Resp: 18   SpO2: 99%   Weight: 77.6 kg (171 lb)       Body mass index is 25.25 kg/m .

## 2025-08-05 ENCOUNTER — PATIENT OUTREACH (OUTPATIENT)
Dept: CARE COORDINATION | Facility: CLINIC | Age: 65
End: 2025-08-05
Payer: COMMERCIAL

## 2025-08-08 ENCOUNTER — TELEPHONE (OUTPATIENT)
Dept: PULMONOLOGY | Facility: CLINIC | Age: 65
End: 2025-08-08
Payer: COMMERCIAL

## 2025-08-14 ENCOUNTER — OFFICE VISIT (OUTPATIENT)
Dept: OBGYN | Facility: CLINIC | Age: 65
End: 2025-08-14
Attending: NURSE PRACTITIONER
Payer: COMMERCIAL

## 2025-08-14 VITALS
BODY MASS INDEX: 25.76 KG/M2 | SYSTOLIC BLOOD PRESSURE: 118 MMHG | HEIGHT: 68 IN | WEIGHT: 170 LBS | DIASTOLIC BLOOD PRESSURE: 74 MMHG

## 2025-08-14 DIAGNOSIS — Z01.419 ENCOUNTER FOR BREAST AND PELVIC EXAMINATION: Primary | ICD-10-CM

## 2025-08-14 DIAGNOSIS — Z12.4 SCREENING FOR CERVICAL CANCER: ICD-10-CM

## 2025-08-14 DIAGNOSIS — Z12.11 SCREEN FOR COLON CANCER: ICD-10-CM

## 2025-08-14 DIAGNOSIS — Z13.820 SPECIAL SCREENING FOR OSTEOPOROSIS: ICD-10-CM

## 2025-08-19 ENCOUNTER — ANCILLARY PROCEDURE (OUTPATIENT)
Dept: CT IMAGING | Facility: CLINIC | Age: 65
End: 2025-08-19
Attending: INTERNAL MEDICINE
Payer: COMMERCIAL

## 2025-08-19 DIAGNOSIS — R91.8 PULMONARY NODULES: ICD-10-CM

## 2025-08-19 LAB
BKR AP ASSOCIATED HPV REPORT: NORMAL
BKR LAB AP GYN ADEQUACY: NORMAL
BKR LAB AP GYN INTERPRETATION: NORMAL
BKR LAB AP PREVIOUS ABNORMAL: NORMAL
PATH REPORT.COMMENTS IMP SPEC: NORMAL
PATH REPORT.COMMENTS IMP SPEC: NORMAL
PATH REPORT.RELEVANT HX SPEC: NORMAL

## 2025-08-19 PROCEDURE — 71250 CT THORAX DX C-: CPT

## 2025-08-21 ENCOUNTER — MYC MEDICAL ADVICE (OUTPATIENT)
Dept: OBGYN | Facility: CLINIC | Age: 65
End: 2025-08-21
Payer: COMMERCIAL

## 2025-08-26 ENCOUNTER — VIRTUAL VISIT (OUTPATIENT)
Dept: PULMONOLOGY | Facility: CLINIC | Age: 65
End: 2025-08-26
Payer: COMMERCIAL

## 2025-08-26 ENCOUNTER — MYC MEDICAL ADVICE (OUTPATIENT)
Dept: PULMONOLOGY | Facility: CLINIC | Age: 65
End: 2025-08-26

## 2025-08-26 DIAGNOSIS — J47.0 BRONCHIECTASIS WITH ACUTE LOWER RESPIRATORY INFECTION (H): Primary | ICD-10-CM

## 2025-08-26 PROCEDURE — 1126F AMNT PAIN NOTED NONE PRSNT: CPT | Mod: 95 | Performed by: PHYSICIAN ASSISTANT

## 2025-08-26 PROCEDURE — 98006 SYNCH AUDIO-VIDEO EST MOD 30: CPT | Performed by: PHYSICIAN ASSISTANT

## 2025-08-26 RX ORDER — PREDNISONE 20 MG/1
40 TABLET ORAL DAILY
Qty: 10 TABLET | Refills: 0 | Status: SHIPPED | OUTPATIENT
Start: 2025-08-26 | End: 2025-08-31

## 2025-08-26 RX ORDER — AZITHROMYCIN 250 MG/1
TABLET, FILM COATED ORAL
Qty: 6 TABLET | Refills: 0 | Status: SHIPPED | OUTPATIENT
Start: 2025-08-26 | End: 2025-08-31

## 2025-08-26 ASSESSMENT — PAIN SCALES - GENERAL: PAINLEVEL_OUTOF10: NO PAIN (0)

## 2025-08-31 ENCOUNTER — MYC REFILL (OUTPATIENT)
Dept: OTHER | Facility: CLINIC | Age: 65
End: 2025-08-31
Payer: COMMERCIAL

## 2025-08-31 DIAGNOSIS — J45.909 UNCOMPLICATED ASTHMA: Primary | ICD-10-CM
